# Patient Record
Sex: FEMALE | Race: WHITE | Employment: UNEMPLOYED | URBAN - METROPOLITAN AREA
[De-identification: names, ages, dates, MRNs, and addresses within clinical notes are randomized per-mention and may not be internally consistent; named-entity substitution may affect disease eponyms.]

---

## 2017-01-16 ENCOUNTER — OFFICE VISIT (OUTPATIENT)
Dept: FAMILY MEDICINE | Facility: OTHER | Age: 32
End: 2017-01-16
Payer: COMMERCIAL

## 2017-01-16 VITALS
HEIGHT: 65 IN | OXYGEN SATURATION: 96 % | WEIGHT: 266.4 LBS | HEART RATE: 93 BPM | DIASTOLIC BLOOD PRESSURE: 84 MMHG | TEMPERATURE: 97.8 F | BODY MASS INDEX: 44.39 KG/M2 | SYSTOLIC BLOOD PRESSURE: 132 MMHG | RESPIRATION RATE: 16 BRPM

## 2017-01-16 DIAGNOSIS — Z76.89 ENCOUNTER TO ESTABLISH CARE: ICD-10-CM

## 2017-01-16 DIAGNOSIS — R63.8 UNABLE TO LOSE WEIGHT: ICD-10-CM

## 2017-01-16 DIAGNOSIS — M25.579 PAIN IN JOINT INVOLVING ANKLE AND FOOT, UNSPECIFIED LATERALITY: Primary | ICD-10-CM

## 2017-01-16 DIAGNOSIS — R06.83 SNORING: ICD-10-CM

## 2017-01-16 DIAGNOSIS — Z30.40 ENCOUNTER FOR SURVEILLANCE OF CONTRACEPTIVES: ICD-10-CM

## 2017-01-16 DIAGNOSIS — G89.29 CHRONIC BILATERAL LOW BACK PAIN WITHOUT SCIATICA: ICD-10-CM

## 2017-01-16 DIAGNOSIS — M54.50 CHRONIC BILATERAL LOW BACK PAIN WITHOUT SCIATICA: ICD-10-CM

## 2017-01-16 DIAGNOSIS — F90.0 ADHD (ATTENTION DEFICIT HYPERACTIVITY DISORDER), INATTENTIVE TYPE: ICD-10-CM

## 2017-01-16 LAB
CRP SERPL-MCNC: 7.3 MG/L (ref 0–8)
ERYTHROCYTE [SEDIMENTATION RATE] IN BLOOD BY WESTERGREN METHOD: 15 MM/H (ref 0–20)
HBA1C MFR BLD: 5.5 % (ref 4.3–6)
TSH SERPL DL<=0.005 MIU/L-ACNC: 2.28 MU/L (ref 0.4–4)

## 2017-01-16 PROCEDURE — 83036 HEMOGLOBIN GLYCOSYLATED A1C: CPT | Performed by: STUDENT IN AN ORGANIZED HEALTH CARE EDUCATION/TRAINING PROGRAM

## 2017-01-16 PROCEDURE — 36415 COLL VENOUS BLD VENIPUNCTURE: CPT | Performed by: STUDENT IN AN ORGANIZED HEALTH CARE EDUCATION/TRAINING PROGRAM

## 2017-01-16 PROCEDURE — 85652 RBC SED RATE AUTOMATED: CPT | Performed by: STUDENT IN AN ORGANIZED HEALTH CARE EDUCATION/TRAINING PROGRAM

## 2017-01-16 PROCEDURE — 86140 C-REACTIVE PROTEIN: CPT | Performed by: STUDENT IN AN ORGANIZED HEALTH CARE EDUCATION/TRAINING PROGRAM

## 2017-01-16 PROCEDURE — 86431 RHEUMATOID FACTOR QUANT: CPT | Performed by: STUDENT IN AN ORGANIZED HEALTH CARE EDUCATION/TRAINING PROGRAM

## 2017-01-16 PROCEDURE — 84443 ASSAY THYROID STIM HORMONE: CPT | Performed by: STUDENT IN AN ORGANIZED HEALTH CARE EDUCATION/TRAINING PROGRAM

## 2017-01-16 PROCEDURE — 99214 OFFICE O/P EST MOD 30 MIN: CPT | Performed by: STUDENT IN AN ORGANIZED HEALTH CARE EDUCATION/TRAINING PROGRAM

## 2017-01-16 RX ORDER — ETONOGESTREL AND ETHINYL ESTRADIOL VAGINAL RING .015; .12 MG/D; MG/D
RING VAGINAL
Qty: 1 EACH | Refills: 1 | Status: SHIPPED | OUTPATIENT
Start: 2017-01-16 | End: 2017-05-08

## 2017-01-16 RX ORDER — CYCLOBENZAPRINE HCL 10 MG
10 TABLET ORAL DAILY PRN
Qty: 30 TABLET | Refills: 0 | Status: SHIPPED | OUTPATIENT
Start: 2017-01-16 | End: 2017-11-29

## 2017-01-16 ASSESSMENT — PATIENT HEALTH QUESTIONNAIRE - PHQ9
10. IF YOU CHECKED OFF ANY PROBLEMS, HOW DIFFICULT HAVE THESE PROBLEMS MADE IT FOR YOU TO DO YOUR WORK, TAKE CARE OF THINGS AT HOME, OR GET ALONG WITH OTHER PEOPLE: SOMEWHAT DIFFICULT
SUM OF ALL RESPONSES TO PHQ QUESTIONS 1-9: 16

## 2017-01-16 ASSESSMENT — PAIN SCALES - GENERAL: PAINLEVEL: NO PAIN (0)

## 2017-01-16 NOTE — PROGRESS NOTES
SUBJECTIVE:                                                    Geneva Cooley is a 31 year old female who presents to clinic today for the following health issues:      Answers for HPI/ROS submitted by the patient on 1/16/2017   If you checked off any problems, how difficult have these problems made it for you to do your work, take care of things at home, or get along with other people?: Somewhat difficult  PHQ9 TOTAL SCORE: 16      Pt is here for contraception request. Pt was on NuvaRing and would like to be back on it. Pt states when she last had the nuvaring and removed it, it was painful. Pt stated she had cramps and was unable to sit; drives school bus.  She thinks she left the NuvaRing in too long when she had it in the past.  She plans to keep better track of when to take it out in hopes that this will prevent cramping     Pt would also like refill on flexeril.  She takes this on occasion for bilateral low back pain and ankle pain     Was going to chiropractor - now can only get 1 x per year with change in insurance, was going 2-3 times per week    5K - 3 years ago - stopped running because of shin splints, now feels to heavy to start exercising and with back pain makes it difficult  Family history of breast cancer - 58 y.o. mother    Weight loss - has been trying to lose weight but has not been able to  Typical daily intake:   6 am - Bagel with PB  Done with bus routes 8:30 -   Leave for [employer] - 8:50 - sometimes will eat bar,   Home - [address] - Lunch - 2 bowls of cereal, eggs,   Nap - until 130 pm, snack  Work - occasionally vending machine  Dinner - one pan dinners, meatloaf, chicken, lasagna, snacking while cooking  Cereal at 10 pm  Pop - Mt. Dew or Root beer - 4-5 cans/day   Has drank diet pop in the past and may be willing to do this again    Going to school for medical coding - 6 credits at a time    Problem list and histories reviewed & adjusted, as indicated.  Additional history: as  "documented    Labs reviewed in EPIC  Problem list, Medication list, Allergies, and Medical/Social/Surgical histories reviewed in [facility] and updated as appropriate.    ROS:  Constitutional, HEENT, cardiovascular, pulmonary, gi and gu systems are negative, except as otherwise noted.    OBJECTIVE:                                                    /84 mmHg  Pulse 93  Temp(Src) 97.8  F (36.6  C) (Temporal)  Resp 16  Ht 5' 5.2\" (1.656 m)  Wt 266 lb 6.4 oz (120.838 kg)  BMI 44.06 kg/m2  SpO2 96%  LMP 12/26/2016 (Approximate)  Body mass index is 44.06 kg/(m^2).  GENERAL: healthy, alert and no distress  EYES: Eyes grossly normal to inspection, PERRL and conjunctivae and sclerae normal  HENT: ear canals and TM's normal, nose and mouth without ulcers or lesions  NECK: no adenopathy, no asymmetry, masses, or scars and thyroid normal to palpation  RESP: lungs clear to auscultation - no rales, rhonchi or wheezes  CV: regular rate and rhythm, normal S1 S2, no S3 or S4, no murmur, click or rub  ABDOMEN: soft, nontender, no hepatosplenomegaly, no masses and bowel sounds normal  MS: mild edema of right ankle, no gross musculoskeletal defects noted  SKIN: no suspicious lesions or rashes  NEURO: Normal strength and tone, mentation intact and speech normal  BACK: bilateral tenderness of lumbar back, negative straight leg raise bilaterally  PSYCH: mentation appears normal, affect normal/bright     Diagnostic Test Results:  Results for orders placed or performed in visit on 01/16/17   Rheumatoid factor   Result Value Ref Range    Rheumatoid Factor <20 <20 IU/mL   TSH with free T4 reflex   Result Value Ref Range    TSH 2.28 0.40 - 4.00 mU/L   ESR: Erythrocyte sedimentation rate   Result Value Ref Range    Sed Rate 15 0 - 20 mm/h   CRP, inflammation   Result Value Ref Range    CRP Inflammation 7.3 0.0 - 8.0 mg/L   Hemoglobin A1c   Result Value Ref Range    Hemoglobin A1C 5.5 4.3 - 6.0 %        ASSESSMENT/PLAN:                   "                                    Geneva was seen today for contraception.    Diagnoses and all orders for this visit:    Pain in joint involving ankle and foot, unspecified laterality  -     cyclobenzaprine (FLEXERIL) 10 MG tablet; Take 1 tablet (10 mg) by mouth daily as needed for muscle spasms  -     Rheumatoid factor  -     ESR: Erythrocyte sedimentation rate  -     CRP, inflammation    Encounter for surveillance of contraceptives  -     etonogestrel-ethinyl estradiol (NUVARING) 0.12-0.015 MG/24HR vaginal ring; Place 1 ring every 21 days then remove for 1 week. Needs appointment for further refills    Snoring  -     SLEEP EVALUATION & MANAGEMENT REFERRAL - ADULT; Future    Unable to lose weight  -     TSH with free T4 reflex  -     Hemoglobin A1c    ADHD (attention deficit hyperactivity disorder), inattentive type  -     NEUROPSYCHOLOGY REFERRAL    Chronic bilateral low back pain without sciatica      Birth control - Patient would like to restart NuvaRing. No history of clotting disorders, breast cancer, liver disease, DVT.      Obesity, snoring - patient has been unable to lose weight.  We reviewed her typical daily intake.  I recommended cutting out regular soda and replacing with diet soda or water.  I also recommended eating a small meal or snack ever 3 hours as she eats breakfast at 6 am and returns home from bus route around 10:15 am and is starving, at this point she reports she overeats and feels terrible.  This often happens in the evening as well, she will eat while making supper and then eat supper.  She will start with these small steps.  Will check TSH and A1C.  She does report snoring and I have recommended sleep study to r/o sleep apnea as this can contribute to obesity.      Ankle and foot pain, bilateral low back pain - patient has family history of rheumatoid arthritis.  She is concerned about ongoing ankle and foot pain with swelling.  RF, CRP and ESR negative.  Pain may improve with weight  loss.  Flexeril does help relax muscles.  She only takes this at night occasionally, 30 tabs lasts approximately 3 months.      ADHD - has history of ADHD.  Has noticed she has to read the pages of her books for school over and over again as she is unable to focus.  Has seen this pattern in other tasks of daily living, procrastinating, feeling unorganized, difficulty focusing.  Has not been on medication for this in the past.  I have recommended neuropsych testing to confirm diagnosis and then we can discuss treating with medication.      See Patient Instructions  Patient Instructions   Nuva Ring and Flexeril prescriptions sent to pharmacy.    Sleep study - call to set this up if your insurance will cover.    Neuropsychology referral - call to set this up if insurance will cover.    Cut back on regular pop - switch to water or diet pop for most of what you drink on a daily basis.    Eat a small meal every 3 hours while awake, try for fruits, vegetables, whole grains and lean protein.      Try to not eat anything after 7 pm.  If you do eat something try again for fruits, vegetables, whole grains and lean protein.    Call me if you have questions.    Mary Coughlin, CNP  [phone]            Jody Coughlin, APRN [facility]

## 2017-01-16 NOTE — PATIENT INSTRUCTIONS
Nuva Ring and Flexeril prescriptions sent to pharmacy.    Sleep study - call to set this up if your insurance will cover.    Neuropsychology referral - call to set this up if insurance will cover.    Cut back on regular pop - switch to water or diet pop for most of what you drink on a daily basis.    Eat a small meal every 3 hours while awake, try for fruits, vegetables, whole grains and lean protein.      Try to not eat anything after 7 pm.  If you do eat something try again for fruits, vegetables, whole grains and lean protein.    Call me if you have questions.    Mary Coughlin, CNP  [phone]

## 2017-01-16 NOTE — MR AVS SNAPSHOT
After Visit Summary   1/16/2017    Geneva Cooley    MRN: [id number]           Patient Information     Date Of Birth          1985        Visit Information        Provider Department      1/16/2017 11:10 AM Jody Coughlin APRN CNP [facility]        Today's Diagnoses     Pain in joint involving ankle and foot, unspecified laterality    -  1     Encounter for surveillance of contraceptives         Snoring         Unable to lose weight         ADHD (attention deficit hyperactivity disorder), inattentive type           Care Instructions    Nuva Ring and Flexeril prescriptions sent to pharmacy.    Sleep study - call to set this up if your insurance will cover.    Neuropsychology referral - call to set this up if insurance will cover.    Cut back on regular pop - switch to water or diet pop for most of what you drink on a daily basis.    Eat a small meal every 3 hours while awake, try for fruits, vegetables, whole grains and lean protein.      Try to not eat anything after 7 pm.  If you do eat something try again for fruits, vegetables, whole grains and lean protein.    Call me if you have questions.    Mary Coughlin CNP  [phone]            Follow-ups after your visit        Additional Services     NEUROPSYCHOLOGY REFERRAL       Your provider has referred you to:    [facility]: [facility] [phone]   [email or web address]  [facility] [phone]   [email or web address]    All scheduling is subject to the client's specific insurance plan & benefits, provider/location availability, and provider clinical specialities.  Please arrive 15 minutes early for your first appointment and bring your completed paperwork.    Please be aware that coverage of these services is subject to the terms and limitations of your health insurance plan.  Call member services at your health plan with any benefit  or coverage questions.    Please bring the following to your appointment:  >>   Any x-rays, CTs or MRIs which have been performed.  Contact the facility where they were done to arrange for  prior to your scheduled appointment.  Any new CT, MRI or other procedures ordered by your specialist must be performed at a [facility] facility or coordinated by your clinic's referral office.    >>   List of current medications   >>   This referral request   >>   Any documents/labs given to you for this referral            SLEEP EVALUATION & MANAGEMENT REFERRAL - ADULT       Please be aware that coverage of these services is subject to the terms and limitations of your health insurance plan.  Call member services at your health plan with any benefit or coverage questions.      Please bring the following to your appointment:    >>   List of current medications   >>   This referral request   >>   Any documents/labs given to you for this referral    [facility]  Ph [phone]  (Age 13 if over 100 lbs and up)                  Future tests that were ordered for you today     Open Future Orders        Priority Expected Expires Ordered    SLEEP EVALUATION & MANAGEMENT REFERRAL - ADULT Routine  1/16/2018 1/16/2017            Who to contact     If you have questions or need follow up information about today's clinic visit or your schedule please contact [facility] directly at [phone].  Normal or non-critical lab and imaging results will be communicated to you by MyChart, letter or phone within 4 business days after the clinic has received the results. If you do not hear from us within 7 days, please contact the clinic through MyChart or phone. If you have a critical or abnormal lab result, we will notify you by phone as soon as possible.  Submit refill requests through [employer] or call your pharmacy and they will forward the refill request to us. Please allow 3 business days for your refill  "to be completed.          Additional Information About Your Visit        [employer] Information     [employer] gives you secure access to your electronic health record. If you see a primary care provider, you can also send messages to your care team and make appointments. If you have questions, please call your primary care clinic.  If you do not have a primary care provider, please call [phone] and they will assist you.        Care EveryWhere ID     This is your Care EveryWhere ID. This could be used by other organizations to access your [facility] medical records  [id number]        Your Vitals Were     Pulse Temperature Respirations    93 97.8  F (36.6  C) (Temporal) 16    Height BMI (Body Mass Index) Pulse Oximetry    5' 5.2\" (1.656 m) 44.06 kg/m2 96%    Last Period          12/26/2016 (Approximate)         Blood Pressure from Last 3 Encounters:   01/16/17 132/84   12/29/15 118/66   04/06/15 106/80    Weight from Last 3 Encounters:   01/16/17 266 lb 6.4 oz (120.838 kg)   12/29/15 235 lb 12 oz (106.935 kg)   04/06/15 218 lb (98.884 kg)              We Performed the Following     CRP, inflammation     ESR: Erythrocyte sedimentation rate     NEUROPSYCHOLOGY REFERRAL     Rheumatoid factor     TSH with free T4 reflex          Today's Medication Changes          These changes are accurate as of: 1/16/17 12:18 PM.  If you have any questions, ask your nurse or doctor.               These medicines have changed or have updated prescriptions.        Dose/Directions    cyclobenzaprine 10 MG tablet   Commonly known as:  FLEXERIL   This may have changed:  when to take this   Used for:  Pain in joint involving ankle and foot, unspecified laterality   Changed by:  Jody Coughlin APRN CNP        Dose:  10 mg   Take 1 tablet (10 mg) by mouth daily as needed for muscle spasms   Quantity:  30 tablet   Refills:  0            Where to get your medicines      These medications were sent to [id number] " [place], [place] - [address] [address]  [address] [facility] [address]     Phone:  [phone]    - cyclobenzaprine 10 MG tablet  - etonogestrel-ethinyl estradiol 0.12-0.015 MG/24HR vaginal ring             Primary Care Provider Office Phone # Fax #    Esthela Velasquez PA-C [phone]       XXX RESIGNED  [facility] [address]        Thank you!     Thank you for choosing [facility]  for your care. Our goal is always to provide you with excellent care. Hearing back from our patients is one way we can continue to improve our services. Please take a few minutes to complete the written survey that you may receive in the mail after your visit with us. Thank you!             Your Updated Medication List - Protect others around you: Learn how to safely use, store and throw away your medicines at www.disposemymeds.org.          This list is accurate as of: 1/16/17 12:18 PM.  Always use your most recent med list.                   Brand Name Dispense Instructions for use    cyclobenzaprine 10 MG tablet    FLEXERIL    30 tablet    Take 1 tablet (10 mg) by mouth daily as needed for muscle spasms       etonogestrel-ethinyl estradiol 0.12-0.015 MG/24HR vaginal ring    NUVARING    1 each    Place 1 ring every 21 days then remove for 1 week. Needs appointment for further refills       MOTRIN IB PO      Take 600 mg by mouth       naproxen 500 MG tablet    NAPROSYN    60 tablet    Take 1 tablet (500 mg) by mouth 2 times daily (with meals) As needed

## 2017-01-16 NOTE — NURSING NOTE
"Chief Complaint   Patient presents with     Contraception       Initial /84 mmHg  Pulse 93  Temp(Src) 97.8  F (36.6  C) (Temporal)  Resp 16  Ht 5' 5.2\" (1.656 m)  Wt 266 lb 6.4 oz (120.838 kg)  BMI 44.06 kg/m2  SpO2 96%  LMP 12/26/2016 (Approximate) Estimated body mass index is 44.06 kg/(m^2) as calculated from the following:    Height as of this encounter: 5' 5.2\" (1.656 m).    Weight as of this encounter: 266 lb 6.4 oz (120.838 kg).  BP completed using cuff size: large.  Berna Munoz CMA      "

## 2017-01-17 LAB — RHEUMATOID FACT SER NEPH-ACNC: <20 IU/ML (ref 0–20)

## 2017-01-17 ASSESSMENT — PATIENT HEALTH QUESTIONNAIRE - PHQ9: SUM OF ALL RESPONSES TO PHQ QUESTIONS 1-9: 16

## 2017-01-20 ENCOUNTER — TELEPHONE (OUTPATIENT)
Dept: FAMILY MEDICINE | Facility: OTHER | Age: 32
End: 2017-01-20

## 2017-01-20 NOTE — TELEPHONE ENCOUNTER
Reason for call:  Results  Reason for Call:  Request for results:    Name of test or procedure: labs    Date of test of procedure: 1-16-17     Location of the test or procedure: [place]    OK to leave the result message on voice mail or with a family member? YES    Phone number Patient can be reached at:  Home number on file [phone] (home) ok to leave detailed message    Additional comments: call with results.    Call taken on 1/20/2017 at 9:14 AM by Geent Faye

## 2017-01-20 NOTE — TELEPHONE ENCOUNTER
Called and discussed results of labs normal.  Patient interested in gastric bypass.  Will look into criteria for this and call patient back.  Mary Coughlin, CNP

## 2017-01-29 PROBLEM — G89.29 CHRONIC BILATERAL LOW BACK PAIN WITHOUT SCIATICA: Status: ACTIVE | Noted: 2017-01-29

## 2017-01-29 PROBLEM — S86.899A SHIN SPLINTS: Status: ACTIVE | Noted: 2017-01-29

## 2017-01-29 PROBLEM — Z80.3 FAMILY HISTORY OF BREAST CANCER IN MOTHER: Status: ACTIVE | Noted: 2017-01-29

## 2017-01-29 PROBLEM — Z82.61 FAMILY HISTORY OF RHEUMATOID ARTHRITIS: Status: ACTIVE | Noted: 2017-01-29

## 2017-01-29 PROBLEM — M54.50 CHRONIC BILATERAL LOW BACK PAIN WITHOUT SCIATICA: Status: ACTIVE | Noted: 2017-01-29

## 2017-01-31 ENCOUNTER — OFFICE VISIT (OUTPATIENT)
Dept: SLEEP MEDICINE | Facility: CLINIC | Age: 32
End: 2017-01-31
Payer: COMMERCIAL

## 2017-01-31 VITALS
BODY MASS INDEX: 44.98 KG/M2 | HEART RATE: 96 BPM | SYSTOLIC BLOOD PRESSURE: 137 MMHG | OXYGEN SATURATION: 96 % | HEIGHT: 65 IN | WEIGHT: 270 LBS | DIASTOLIC BLOOD PRESSURE: 77 MMHG

## 2017-01-31 DIAGNOSIS — R06.83 SNORING: ICD-10-CM

## 2017-01-31 DIAGNOSIS — G47.33 OSA (OBSTRUCTIVE SLEEP APNEA): Primary | ICD-10-CM

## 2017-01-31 PROCEDURE — 99203 OFFICE O/P NEW LOW 30 MIN: CPT | Performed by: PHYSICIAN ASSISTANT

## 2017-01-31 RX ORDER — ZOLPIDEM TARTRATE 5 MG/1
TABLET ORAL
Qty: 1 TABLET | Refills: 0 | Status: SHIPPED | OUTPATIENT
Start: 2017-01-31 | End: 2017-08-28

## 2017-01-31 NOTE — PATIENT INSTRUCTIONS
Your BMI is There is no weight on file to calculate BMI.  Weight management is a personal decision.  If you are interested in exploring weight loss strategies, the following discussion covers the approaches that may be successful. Body mass index (BMI) is one way to tell whether you are at a healthy weight, overweight, or obese. It measures your weight in relation to your height.  A BMI of 18.5 to 24.9 is in the healthy range. A person with a BMI of 25 to 29.9 is considered overweight, and someone with a BMI of 30 or greater is considered obese. More than two-thirds of American adults are considered overweight or obese.  Being overweight or obese increases the risk for further weight gain. Excess weight may lead to heart disease and diabetes.  Creating and following plans for healthy eating and physical activity may help you improve your health.  Weight control is part of healthy lifestyle and includes exercise, emotional health, and healthy eating habits. Careful eating habits lifelong are the mainstay of weight control. Though there are significant health benefits from weight loss, long-term weight loss with diet alone may be very difficult to achieve- studies show long-term success with dietary management in less than 10% of people. Attaining a healthy weight may be especially difficult to achieve in those with severe obesity. In some cases, medications, devices and surgical management might be considered.  What can you do?  If you are overweight or obese and are interested in methods for weight loss, you should discuss this with your provider.     Consider reducing daily calorie intake by 500 calories.     Keep a food journal.     Avoiding skipping meals, consider cutting portions instead.    Diet combined with exercise helps maintain muscle while optimizing fat loss. Strength training is particularly important for building and maintaining muscle mass. Exercise helps reduce stress, increase energy, and  "improves fitness. Increasing exercise without diet control, however, may not burn enough calories to loose weight.       Start walking three days a week 10-20 minutes at a time    Work towards walking thirty minutes five days a week     Eventually, increase the speed of your walking for 1-2 minutes at time    In addition, we recommend that you review healthy lifestyles and methods for weight loss available through the National Institutes of Health patient information sites:  http://win.niddk.nih.gov/publications/index.htm    And look into health and wellness programs that may be available through your health insurance provider, employer, local community center, or aleksandr club.    Weight management plan: Patient was referred to their PCP to discuss a diet and exercise plan.    MY TREATMENT INFORMATION FOR SLEEP APNEA-  [place] Cooley    DOCTOR : Nacho Johnson  SLEEP CENTER :      MY CONTACT NUMBER:       If I haven't had a sleep study yet, what can I expect?  A personal story from [facility]  https://www.[employer].com/watch?v=AxPLmlRpnCs    Am I having a home sleep study?  Here is a video in case you get home and want to make sure you have done it correctly  https://www.[employer].com/watch?v=FQX7R3pKmz0&feature=youtu.be    Frequently asked questions:  1. What is Obstructive Sleep Apnea (RODRIGO)? RODRIGO is the most common type of sleep apnea. Apnea literally means, \"without breath.\" It is characterized by repetitive pauses in breathing, despite continued effort to breathe, and is usually associated with a reduction in blood oxygen saturation. Apneas can last 10 to over 60 seconds. It is caused by narrowing or collapse of the upper airway as muscles relax during sleep. Severity of sleep apnea is determined by frequency of breathing events and their effect on your sleep and oxygen levels determined during sleep testing.   2. What are the consequences of RODRIGO? Symptoms include: daytime sleepiness- possibly increasing the risk of falling " asleep while driving, unrefreshing/restless sleep, snoring, insomnia, waking frequently to urinate, waking with heartburn or reflux, reduced concentration and memory, and morning headaches. Other health consequences may include development of high blood pressure and other cardiovascular disease in persons who are susceptible. Untreated RODRIGO  can contribute to heart disease, stroke and diabetes.   3. What are the treatment options? In most situations, sleep apnea is a lifelong disease that must be managed with daily therapy. Medications are not effective for sleep apnea and surgery is generally not performed until other therapies have been tried. Therapy is usually tailored to the individual patient based on many factors including your wishes as well as severity of sleep apnea and severity of obesity. Continuous Positive Airway (CPAP) is the most reliable treatment. An oral device to hold your jaw forward is usually the next most reliable option. Other options include postioning devices (to keep you off your back), weight loss, and surgery including a tongue pacing device. There is more detail about some of these options below.            1. CPAP-  WHAT DOES IT DO AND HOW CAN I LEARN TO WEAR IT?                               BEFORE I START, CAN I WATCH A MOVIE TO GET A PLAN ON HOW TO USE CPAP?  https://www.[employer].com/watch?f=m2F00tk824S      Continuous positive airway pressure, or CPAP, is the most effective treatment for obstructive sleep apnea. It works by blowing room air, through a mask, to hold your throat open. A decision to use CPAP is a major step forward in the pursuit of a healthier life. The successful use of CPAP will help you breathe easier, sleep better and live healthier. You can choose CPAP equipment from any durable medical equipment provider that meets your needs.  Using CPAP can be a positive experience if you keep these dailey points in mind:  1. Commitment  CPAP is not a quick fix for your problem.  "It involves a long-term commitment to improve your sleep and your health.    2. Communication  Stay in close communication with both your sleep doctor and your CPAP supplier. Ask lots of questions and seek help when you need it.    3. Consistency  Use CPAP all night, every night and for every nap. You will receive the maximum health benefits from CPAP when you use it every time that you sleep. This will also make it easier for your body to adjust to the treatment.    4. Correction  The first machine and mask that you try may not be the best ones for you. Work with your sleep doctor and your CPAP supplier to make corrections to your equipment selection. Ask about trying a different type of machine or mask if you have ongoing problems. Make sure that your mask is a good fit and learn to use your equipment properly.    5. Challenge  Tell a family member or close friend to ask you each morning if you used your CPAP the previous night. Have someone to challenge you to give it your best effort.    6. Connection   Your adjustment to CPAP will be easier if you are able to connect with others who use the same treatment. Ask your sleep doctor if there is a support group in your area for people who have sleep apnea, or look for one on the Internet.  7. Comfort   Increase your level of comfort by using a saline spray, decongestant or heated humidifier if CPAP irritates your nose, mouth or throat. Use your unit's \"ramp\" setting to slowly get used to the air pressure level. There may be soft pads you can buy that will fit over your mask straps. Look on www.CPAP.com for accessories that can help make CPAP use more comfortable.  8. Cleaning   Clean your mask, tubing and headgear on a regular basis. Put this time in your schedule so that you don't forget to do it. Check and replace the filters for your CPAP unit and humidifier.    9. Completion   Although you are never finished with CPAP therapy, you should reward yourself by " celebrating the completion of your first month of treatment. Expect this first month to be your hardest period of adjustment. It will involve some trial and error as you find the machine, mask and pressure settings that are right for you.    10. Continuation  After your first month of treatment, continue to make a daily commitment to use your CPAP all night, every night and for every nap.    CPAP-Tips to starting with success:  Begin using your CPAP for short periods of time during the day while you watch TV or read.    Use CPAP every night and for every nap. Using it less often reduces the health benefits and makes it harder for your body to get used to it.    Make small adjustments to your mask, tubing, straps and headgear until you get the right fit. Tightening the mask may actually worsen the leak.  If it leaves significant marks on your face or irritates the bridge of your nose, it may not be the best mask for you.  Speak with the person who supplied the mask and consider trying other masks. Insurances will allow you to try different masks during the first month of starting CPAP.  Insurance also covers a new mask, hose and filter about every 6 months.    Use a saline nasal spray to ease mild nasal congestion. Neti-Pot or saline nasal rinses may also help. Nasal gel sprays can help reduce nasal dryness.  Biotene mouthwash can be helpful to protect your teeth if you experience frequent dry mouth.  Dry mouth may be a sign of air escaping out of your mouth or out of the mask in the case of a full face mask.  Speak with your provider if you expect that is the case.     Take a nasal decongestant to relieve more severe nasal or sinus congestion.  Do not use Afrin (oxymetazoline) nasal spray more than 3 days in a row.  Speak with your sleep doctor if your nasal congestion is chronic.    Use a heated humidifier that fits your CPAP model to enhance your breathing comfort. Adjust the heat setting up if you get a dry nose  or throat, down if you get condensation in the hose or mask.  Position the CPAP lower than you so that any condensation in the hose drains back into the machine rather than towards the mask.    Try a system that uses nasal pillows if traditional masks give you problems.    Clean your mask, tubing and headgear once a week. Make sure the equipment dries fully.    Regularly check and replace the filters for your CPAP unit and humidifier.    Work closely with your sleep provider and your CPAP supplier to make sure that you have the machine, mask and air pressure setting that works best for you. It is better to stop using it and call your provider to solve problems than to lay awake all night frustrated with the device.    BESIDES CPAP, WHAT OTHER THERAPIES ARE THERE?      Positioning Device  Positioning devices are generally used when sleep apnea is mild and only occurs on your back.This example shows a pillow that straps around the waist. It may be appropriate for those whose sleep study shows milder sleep apnea that occurs primarily when lying flat on one's back. Preliminary studies have shown benefit but effectiveness at home may need to be verified by a home sleep test. These devices are generally not covered by medical insurance.                      Oral Appliance  What is oral appliance therapy?  An oral appliance is a small acrylic device that fits over the upper and lower teeth or tongue (similar to an orthodontic retainer or a mouth guard). This device slightly advances the lower jaw or tongue, which moves the base of the tongue forward, opens the airway, improves breathing and can effectively treat snoring and obstructive sleep apnea sleep apnea. The appliance is fabricated and customized by a qualified dentist with experience in treating snoring and sleep apnea. Oral appliances are usually well tolerated and have relatively high compliance by patients1, 2, 3.  When is an oral appliance indicated?  Oral  appliance therapy is recommended as a first-line treatment for patients with primary snoring, mild sleep apnea, and for patients with moderate sleep apnea who prefer appliance therapy to use of CPAP4, 5. Severity of sleep apnea is determined by sleep testing and is based on the number of respiratory events per hour of sleep.   How successful is oral appliance therapy?  The success rate of oral appliance therapy in patients with mild sleep apnea is 75-80% while in patients with moderate sleep apnea it is 50-70%. The chance of success in patients with severe sleep apnea is 40-50%. The research also shows that oral appliances have a beneficial effect on the cardiovascular health of RODRIGO patients at the same magnitude as CPAP therapy7.  Oral appliances should be a second-line treatment in cases of severe sleep apnea, but if not completely successful then a combination therapy utilizing CPAP plus oral appliance therapy may be effective. Oral appliances tend to be effective in a broad range of patients although studies show that the patients who have the highest success are females, younger patients, those with milder disease, and less severe obesity. 3, 6.   The chances of success are lower in patients who have more severe RODRIGO, are older, and those who are morbidly obese.     Example of an oral appliance   Finding a dentist that practices dental sleep medicine  Specific training is available through the American Academy of Dental Sleep Medicine for dentists interested in working in the field of sleep. To find a dentist who is educated in the field of sleep and the use of oral appliances, near you, visit the Web site of the American Academy of Dental Sleep Medicine; also see   http://www.accpstorage.org/newOrganization/patients/oralAppliances.pdf  To search for a dentist certified in these practices:  Http://aadsm.org/FindADentist.aspx?1  1. Pretty et al. Objectively measured vs self-reported compliance during oral  appliance therapy for sleep-disordered breathing. Chest 2013; 144(5): 6083-8733.  2. Daly, et al. Objective measurement of compliance during oral appliance therapy for sleep-disordered breathing. Thorax 2013; 68(1): 91-96.  3. Kell et al. Mandibular advancement devices in 620 men and women with RODRIGO and snoring: tolerability and predictors of treatment success. Chest 2004; 125: 0149-2490.  4. Ronit et al. Oral appliances for snoring and RODRIGO: a review. Sleep 2006; 29: 244-262.  5. Alysia et al. Oral appliance treatment for RODRIGO: an update. J Clin Sleep Med 2014; 10(2): 215-227.  6. Moira et al. Predictors of OSAH treatment outcome. J Dent Res 2007; 86: 6187-4385.      Weight Loss:    Weight management is a personal decision.  If you are interested in exploring weight loss strategies, the following discussion covers the impact on weight loss on sleep apnea and the approaches that may be successful.    Weight loss decreases severity of sleep apnea in most people with obesity. For those with mild obesity who have developed snoring with weight gain, even 15-30 pound weight loss can improve and occasionally eliminate sleep apnea.  Structured and life-long dietary and health habits are necessary to lose weight and keep healthier weight levels.     Though there may be significant health benefits from weight loss, long-term weight loss is very difficult to achieve- studies show success with dietary management in less than 10% of people. In addition, substantial weight loss may require years of dietary control and may be difficult if patients have severe obesity. In these cases, surgical management may be considered.  Finally, older individuals who have tolerated obesity without health complications may be less likely to benefit from weight loss strategies.    Your BMI is Body mass index is 44.93 kg/(m^2).  Body mass index (BMI) is one way to tell whether you are at a healthy weight, overweight, or  obese. It measures your weight in relation to your height.  A BMI of 18.5 to 24.9 is in the healthy range. A person with a BMI of 25 to 29.9 is considered overweight, and someone with a BMI of 30 or greater is considered obese. More than two-thirds of American adults are considered overweight or obese.  Being overweight or obese increases the risk for further weight gain. Excess weight may lead to heart disease and diabetes.  Creating and following plans for healthy eating and physical activity may help you improve your health.  Weight control is part of healthy lifestyle and includes exercise, emotional health, and healthy eating habits. Careful eating habits lifelong are the mainstay of weight control. Though there are significant health benefits from weight loss, long-term weight loss with diet alone may be very difficult to achieve- studies show long-term success with dietary management in less than 10% of people. Attaining a healthy weight may be especially difficult to achieve in those with severe obesity. In some cases, medications, devices and surgical management might be considered.  What can you do?  If you are overweight or obese and are interested in methods for weight loss, you should discuss this with your provider.     Consider reducing daily calorie intake by 500 calories.     Keep a food journal.     Avoiding skipping meals, consider cutting portions instead.    Diet combined with exercise helps maintain muscle while optimizing fat loss. Strength training is particularly important for building and maintaining muscle mass. Exercise helps reduce stress, increase energy, and improves fitness. Increasing exercise without diet control, however, may not burn enough calories to loose weight.       Start walking three days a week 10-20 minutes at a time    Work towards walking thirty minutes five days a week     Eventually, increase the speed of your walking for 1-2 minutes at time    In addition, we  recommend that you review healthy lifestyles and methods for weight loss available through the National Institutes of Health patient information sites:  http://win.niddk.nih.gov/publications/index.htm    And look into health and wellness programs that may be available through your health insurance provider, employer, local community center, or aleks[employer]r club.    Weight management plan: Patient was referred to their PCP to discuss a diet and exercise plan.    Surgery:    Upper Airway Surgery for RODRIGO  Surgery for RODRIGO is a second-line treatment option in the management of sleep apnea.  Surgery should be considered for patients who are having a difficult time tolerating CPAP.    Surgery for RODRIGO is directed at areas that are responsible for narrowing or complete obstruction of the airway during sleep.  There are a wide range of procedures available to enlarge and/or stabilize the airway to prevent blockage of breathing in the three major areas where it can occur: the palate, tongue, and nasal regions.  Successful surgical treatment depends on the accurate identification of the factors responsible for obstructive sleep apnea in each person.  A personalized approach is required because there is no single treatment that works well for everyone.  Because of anatomic variation, consultation with an examination by a sleep surgeon is a critical first step in determining what surgical options are best for each patient.  In some cases, examination during sedation may be recommended in order to guide the selection of procedures.  Patients will be counseled about risks and benefits as well as the typical recovery course after surgery. Surgery is typically not a cure for a person s RODRIGO.  However, surgery will often significantly improve one s RODRIGO severity (termed  success rate ).  Even in the absence of a cure, surgery will decrease the cardiovascular risk associated with OSA7; improve overall quality of life8 (sleepiness,  functionality, sleep quality, etc).          Palate Procedures:  Patients with RODRIGO often have narrowing of their airway in the region of their tonsils and uvula.  The goals of palate procedures are to widen the airway in this region as well as to help the tissues resist collapse.  Modern palate procedure techniques focus on tissue conservation and soft tissue rearrangement, rather than tissue removal.  Often the uvula is preserved in this procedure. Residual sleep apnea is common in patient after pharyngoplasty with an average reduction in sleep apnea events of 33%2.      Tongue Procedures:  While patients are awake, the muscles that surround the throat are active and keep this region open for breathing. These muscles relax during sleep, allowing the tongue and other structures to collapse and block breathing.  There are several different tongue procedures available.  Selection of a tongue base procedure depends on characteristics seen on physical exam.  Generally, procedures are aimed at removing bulky tissues in this area or preventing the back of the tongue from falling back during sleep.  Success rates for tongue surgery range from 50-62%3.    Hypoglossal Nerve Stimulation:  Hypoglossal nerve stimulation has recently received approval from the [place] Food and Drug Administration for the treatment of obstructive sleep apnea.  This is based on research showing that the system was safe and effective in treating sleep apnea6.  Results showed that the median AHI score decreased 68%, from 29.3 to 9.0. This therapy uses an implant system that senses breathing patterns and delivers mild stimulation to airway muscles, which keeps the airway open during sleep.  The system consists of three fully implanted components: a small generator (similar in size to a pacemaker), a breathing sensor, and a stimulation lead.  Using a small handheld remote, a patient turns the therapy on before bed and off upon awakening.     Candidates for this device must be greater than 22 years of age, have moderate to severe RODRIGO (AHI between 20-65), BMI less than 32, have tried CPAP/oral appliance without success, and have appropriate upper airway anatomy (determined by a sleep endoscopy performed by Dr. Thomson).    Hypoglossal Nerve Stimulation Pathway:    The sleep surgeon s office will work with the patient through the insurance prior-authorization process (including communications and appeals).    Nasal Procedures:  Nasal obstruction can interfere with nasal breathing during the day and night.  Studies have shown that relief of nasal obstruction can improve the ability of some patients to tolerate positive airway pressure therapy for obstructive sleep apnea1.  Treatment options include medications such as nasal saline, topical corticosteroid and antihistamine sprays, and oral medications such as antihistamines or decongestants. Non-surgical treatments can include external nasal dilators for selected patients. If these are not successful by themselves, surgery can improve the nasal airway either alone or in combination with these other options.      Combination Procedures:  Combination of surgical procedures and other treatments may be recommended, particularly if patients have more than one area of narrowing or persistent positional disease.  The success rate of combination surgery ranges from 66-80%2,3.      1. Amish YOO. The Role of the Nose in Snoring and Obstructive Sleep Apnoea: An Update.  Eur Arch Otorhinolaryngol. 2011; 268: 1365-73.  2.  Jesús SM; Johnie JA; Adonis JR; Pallanch JF; Marlee MB; Marlen SG; Rosy PEREZD. Surgical modifications of the upper airway for obstructive sleep apnea in adults: a systematic review and meta-analysis. SLEEP 2010;33(10):9529-3986. Jacobo VELÁZQUEZ. Hypopharyngeal surgery in obstructive sleep apnea: an evidence-based medicine review.  Arch Otolaryngol Head Neck Surg. 2006 Feb;132(2):206-13.  3. Josiah HASTINGS,  Indu Y, Pantera JESSICA. The efficacy of anatomically based multilevel surgery for obstructive sleep apnea. Otolaryngol Head Neck Surg. 2003 Oct;129(4):327-35.  4. Jacobo VELÁZQUEZ, Goldberg A. Hypopharyngeal Surgery in Obstructive Sleep Apnea: An Evidence-Based Medicine Review. Arch Otolaryngol Head Neck Surg. 2006 Feb;132(2):206-13.  5. Richy FELDER et al. Upper-Airway Stimulation for Obstructive Sleep Apnea.  N Engl J Med. 2014 Jan 9;370(2):139-49.  6. Angelic Y et al. Increased Incidence of Cardiovascular Disease in Middle-aged Men with Obstructive Sleep Apnea. Am J Respir Crit Care Med; 2002 166: 159-165  7. Mahesh DUMONT et al. Studying Life Effects and Effectiveness of Palatopharyngoplasty (SLEEP) study: Subjective Outcomes of Isolated Uvulopalatopharyngoplasty. Otolaryngol Head Neck Surg. 2011; 144: 623-631.    Stress, tension, and anxiety can be big factors contributing to insomnia.  If you can t relax your mind and body, then you won t be able to sleep.  You would likely benefit from trying some of the relaxation exercises that we ve assembled below.  These are all internet based links.  If you don t have or use a computer, you may benefit from one of the stress management books listed below that you can get at your public library or at a local bookstore for a relatively low price.      Copy and paste into web browser address window   https://www.youtube.com/watch?v=Ksxc3buTnYq    Progressive Muscle Relaxation (PMR):     http://www.[employer]/progressive-muscle-relaxation-exercise.html     http://studentsupport.[facility]/counseling/resources/self-help/relaxation-and-stress-management/   Deep Breathing Exercises:    http://www.[employer]/breathing-awareness.html     Meditation:     www.[employer]    www.[employer]Syn[employer]idedDeluxeBoxmeditation-site.com You may have to pay for some of these resources.    Guided Imagery:    http://www.[employer]/guided-imagery-scripts.html      http://[employer].com/library/gkbamqedvh-nzksie-czyycxu/    [place] site under construction : http://www.[facility].org/Services/SleepMedicine/Audio/index.htm

## 2017-01-31 NOTE — PROGRESS NOTES
Sleep Consultation:    Date on this visit: 1/31/2017    Geneva Cooley is sent by No ref. provider found for a sleep consultation regarding fatigue, excessive sleep.     Primary Physician: Esthela Velasquez     Geneva Cooley reports frequent snoring for three years.     Geneva goes to sleep at 7:30 PM during the week. She wakes up at 6:30 AM with an alarm. She falls asleep in 60 minutes.  Geneva has occasional difficulty falling asleep.  She wakes up 5-8 times a night for 10 minutes before falling back to sleep.  Geneva wakes up to go to the bathroom.  On weekends, Geneva goes to sleep at 7:30 PM.  She wakes up at 11:00 AM without an alarm. She falls asleep in 60 minutes.  Patient gets an average of 10 hours of sleep per night.     Patient does use electronics in bed.     Geneva does not do shift work.  She works day shifts.      Geneva does snore some nights. Patient does not have a regular bed partner. There is report of snoring.  She does not have witnessed apneas. Patient sleeps on her back and side. She has occasional restless legs,. Geneva denies any bruxism.    She confirms sleep talking as a child.  Geneva has heartburn and situational depression.      Geneva has gained 30-40 pounds in the last year.  Patient describes themself as a morning person.  She would prefer to go to sleep at 10:00 PM and wake up at 5:30 AM.  Patient's [place] Sleepiness score 6/24 inconsistent with excessive  daytime sleepiness.      Geneva naps 5-6 times per week for 120-180 minutes, feels unrefreshed after naps. She takes no inadvertant naps.  She admits dozing while driving. The most recent episode was 1 month(s) ago.  Patient was counseled on the importance of driving while alert, to pull over if drowsy, or nap before getting into the vehicle if sleepy.  She uses 4 sodas/day, occasional coffee and energy drink. Last caffeine intake is usually before 4 pm.    Allergies:    Allergies   Allergen Reactions     Vicodin [Hydrocodone-Acetaminophen]  Hives       Medications:    Current Outpatient Prescriptions   Medication Sig Dispense Refill     cyclobenzaprine (FLEXERIL) 10 MG tablet Take 1 tablet (10 mg) by mouth daily as needed for muscle spasms 30 tablet 0     etonogestrel-ethinyl estradiol (NUVARING) 0.12-0.015 MG/24HR vaginal ring Place 1 ring every 21 days then remove for 1 week. Needs appointment for further refills 1 each 1     MOTRIN IB PO Take 600 mg by mouth       naproxen (NAPROSYN) 500 MG tablet Take 1 tablet (500 mg) by mouth 2 times daily (with meals) As needed 60 tablet 1       Problem List:  Patient Active Problem List    Diagnosis Date Noted     Chronic bilateral low back pain without sciatica 01/29/2017     Priority: Medium     Family history of breast cancer in mother 01/29/2017     Priority: Medium     Shin splints 01/29/2017     Priority: Medium     Family history of rheumatoid arthritis 01/29/2017     Priority: Medium     Anxiety 04/06/2015     Major depressive disorder, single episode, moderate (H) 04/06/2015     CARDIOVASCULAR SCREENING; LDL GOAL LESS THAN 160 12/06/2012     Insomnia 04/17/2012     Knee strain 04/17/2012     Obesity 04/06/2012     Body mass index is 32.48 kg/(m^2).            Past Medical/Surgical History:  Past Medical History   Diagnosis Date     Depression      Shingles outbreak 2012     Past Surgical History   Procedure Laterality Date     No history of surgery         Social History:  Social History     Social History     Marital Status: Single     Spouse Name: N/A     Number of Children: N/A     Years of Education: N/A     Occupational History           school bus diver     Social History Main Topics     Smoking status: Never Smoker      Smokeless tobacco: Never Used     Alcohol Use: 0.0 oz/week     0 Standard drinks or equivalent per week      Comment: rarely     Drug Use: No     Sexual Activity:     Partners: Male     Birth Control/ Protection: Condom, Inserts/Ring     Other Topics Concern     Parent/Sibling W/  Cabg, Mi Or Angioplasty Before 65f 55m? Yes     Dad- Heartattack, Mom- Stroke     Social History Narrative       Family History:  Family History   Problem Relation Age of Onset     Hypertension Mother      DIABETES Mother      Hypertension Father      Arthritis Father      CEREBROVASCULAR DISEASE Mother 46     aneurysm     HEART DISEASE Father      MI in late 40's      Lipids Mother      Lipids Father      Depression Mother      Hypertension Sister      Hypertension Brother      Breast Cancer Mother 60       Review of Systems:  A complete review of systems reviewed by me is negative with the exeption of what has been mentioned in the history of present illness.  CONSTITUTIONAL:  POSITIVE for  weight gain, fever , chills, sweats and drug allergies :vicodin  EYES:  POSITIVE for changes in vision  ENT:  POSITIVE for  sore throat  CARDIAC:  POSITIVE for  swollen feet  NEUROLOGIC:  POSITIVE for  headaches  DERMATOLOGIC: NEGATIVE for rashes, new moles or change in mole(s)  PULMONARY:  POSITIVE for  SOB with activity and productive cough  GASTROINTESTINAL: NEGATIVE for nausea or vomitting, loose or watery stools, fat or grease in stools, constipation, abdominal pain, bowel movements black in color or blood noted.  GENITOURINARY:  POSITIVE for  urinating more frequently than usual and irregular menstrual periods  MUSCULOSKELETAL:  POSITIVE for  muscle pain and bone or joint pain  ENDOCRINE: NEGATIVE for increased thirst or urination, diabetes.  LYMPHATIC: NEGATIVE for swollen lymph nodes, lumps or bumps in the breasts or nipple discharge.    Physical Examination:  Vitals: LMP 12/26/2016 (Approximate)  BMI= Body mass index is 44.93 kg/(m^2).         No flowsheet data found.    GENERAL APPEARANCE: healthy, alert and no distress  HENT: nose and mouth without ulcers or lesions, oropharynx crowded and tonsillar exudate  NECK: no adenopathy, no asymmetry, masses, or scars, thyroid normal to palpation and trachea midline and  normal to palpation  RESP: lungs clear to auscultation - no rales, rhonchi or wheezes  CV: regular rates and rhythm, normal S1 S2, no S3 or S4 and no murmur, click or rub  MS: extremities normal- no gross deformities noted  PSYCH: mentation appears normal and affect normal/bright  Mallampati Class: III.  Tonsillar Stage: 2  visible at pillars.    Impression/Plan:  Excessive fatigue, history of snoring, recent weight gain. TSH is normal. Will set up a split night study with TCM monitoring due to BMI.  Literature provided regarding sleep apnea and insomnia.      She will follow up with me in approximately two weeks after her sleep study has been competed to review the results and discuss plan of care.       Polysomnography reviewed.  Obstructive sleep apnea reviewed.  Complications of untreated sleep apnea were reviewed.  Daria: 3      CC: No ref. provider found

## 2017-01-31 NOTE — NURSING NOTE
"Chief Complaint   Patient presents with     Sleep Problem     fatigued wt gain       Initial /77 mmHg  Pulse 96  Ht 1.651 m (5' 5\")  Wt 122.471 kg (270 lb)  BMI 44.93 kg/m2  SpO2 96%  LMP 12/26/2016 (Approximate) Estimated body mass index is 44.93 kg/(m^2) as calculated from the following:    Height as of this encounter: 1.651 m (5' 5\").    Weight as of this encounter: 122.471 kg (270 lb).  BP completed using cuff size: large    "

## 2017-01-31 NOTE — MR AVS SNAPSHOT
After Visit Summary   1/31/2017    Geneva Cooley    MRN: [id number]           Patient Information     Date Of Birth          1985        Visit Information        Provider Department      1/31/2017 10:00 AM Nacho Simmons PA-C [facility] [place]        Today's Diagnoses     RODRIGO (obstructive sleep apnea)    -  1     Snoring           Care Instructions      Your BMI is There is no weight on file to calculate BMI.  Weight management is a personal decision.  If you are interested in exploring weight loss strategies, the following discussion covers the approaches that may be successful. Body mass index (BMI) is one way to tell whether you are at a healthy weight, overweight, or obese. It measures your weight in relation to your height.  A BMI of 18.5 to 24.9 is in the healthy range. A person with a BMI of 25 to 29.9 is considered overweight, and someone with a BMI of 30 or greater is considered obese. More than two-thirds of American adults are considered overweight or obese.  Being overweight or obese increases the risk for further weight gain. Excess weight may lead to heart disease and diabetes.  Creating and following plans for healthy eating and physical activity may help you improve your health.  Weight control is part of healthy lifestyle and includes exercise, emotional health, and healthy eating habits. Careful eating habits lifelong are the mainstay of weight control. Though there are significant health benefits from weight loss, long-term weight loss with diet alone may be very difficult to achieve- studies show long-term success with dietary management in less than 10% of people. Attaining a healthy weight may be especially difficult to achieve in those with severe obesity. In some cases, medications, devices and surgical management might be considered.  What can you do?  If you are overweight or obese and are interested in methods for weight loss, you should discuss  "this with your provider.     Consider reducing daily calorie intake by 500 calories.     Keep a food journal.     Avoiding skipping meals, consider cutting portions instead.    Diet combined with exercise helps maintain muscle while optimizing fat loss. Strength training is particularly important for building and maintaining muscle mass. Exercise helps reduce stress, increase energy, and improves fitness. Increasing exercise without diet control, however, may not burn enough calories to loose weight.       Start walking three days a week 10-20 minutes at a time    Work towards walking thirty minutes five days a week     Eventually, increase the speed of your walking for 1-2 minutes at time    In addition, we recommend that you review healthy lifestyles and methods for weight loss available through the National Institutes of Health patient information sites:  http://win.niddk.nih.gov/publications/index.htm    And look into health and wellness programs that may be available through your health insurance provider, employer, local community center, or aleksandr club.    Weight management plan: Patient was referred to their PCP to discuss a diet and exercise plan.    MY TREATMENT INFORMATION FOR SLEEP APNEA-  Geneva Cooley    DOCTOR : Nacho Johnson  SLEEP CENTER :      MY CONTACT NUMBER:       If I haven't had a sleep study yet, what can I expect?  A personal story from [employer]  https://www.youDriveube.com/watch?v=AxPLmlRpnCs    Am I having a home sleep study?  Here is a video in case you get home and want to make sure you have done it correctly  https://www.Easy Iceube.com/watch?v=UCA2W7lEyy6&feature=youtu.be    Frequently asked questions:  1. What is Obstructive Sleep Apnea (RODRIGO)? RODRIGO is the most common type of sleep apnea. Apnea literally means, \"without breath.\" It is characterized by repetitive pauses in breathing, despite continued effort to breathe, and is usually associated with a reduction in blood oxygen saturation. Apneas " can last 10 to over 60 seconds. It is caused by narrowing or collapse of the upper airway as muscles relax during sleep. Severity of sleep apnea is determined by frequency of breathing events and their effect on your sleep and oxygen levels determined during sleep testing.   2. What are the consequences of RODRIGO? Symptoms include: daytime sleepiness- possibly increasing the risk of falling asleep while driving, unrefreshing/restless sleep, snoring, insomnia, waking frequently to urinate, waking with heartburn or reflux, reduced concentration and memory, and morning headaches. Other health consequences may include development of high blood pressure and other cardiovascular disease in persons who are susceptible. Untreated RODRIGO  can contribute to heart disease, stroke and diabetes.   3. What are the treatment options? In most situations, sleep apnea is a lifelong disease that must be managed with daily therapy. Medications are not effective for sleep apnea and surgery is generally not performed until other therapies have been tried. Therapy is usually tailored to the individual patient based on many factors including your wishes as well as severity of sleep apnea and severity of obesity. Continuous Positive Airway (CPAP) is the most reliable treatment. An oral device to hold your jaw forward is usually the next most reliable option. Other options include postioning devices (to keep you off your back), weight loss, and surgery including a tongue pacing device. There is more detail about some of these options below.            1. CPAP-  WHAT DOES IT DO AND HOW CAN I LEARN TO WEAR IT?                               BEFORE I START, CAN I WATCH A MOVIE TO GET A PLAN ON HOW TO USE CPAP?  https://www.EvaluAgent.com/watch?l=k6K67gv670H      Continuous positive airway pressure, or CPAP, is the most effective treatment for obstructive sleep apnea. It works by blowing room air, through a mask, to hold your throat open. A decision to  "use CPAP is a major step forward in the pursuit of a healthier life. The successful use of CPAP will help you breathe easier, sleep better and live healthier. You can choose CPAP equipment from any durable medical equipment provider that meets your needs.  Using CPAP can be a positive experience if you keep these dailey points in mind:  1. Commitment  CPAP is not a quick fix for your problem. It involves a long-term commitment to improve your sleep and your health.    2. Communication  Stay in close communication with both your sleep doctor and your CPAP supplier. Ask lots of questions and seek help when you need it.    3. Consistency  Use CPAP all night, every night and for every nap. You will receive the maximum health benefits from CPAP when you use it every time that you sleep. This will also make it easier for your body to adjust to the treatment.    4. Correction  The first machine and mask that you try may not be the best ones for you. Work with your sleep doctor and your CPAP supplier to make corrections to your equipment selection. Ask about trying a different type of machine or mask if you have ongoing problems. Make sure that your mask is a good fit and learn to use your equipment properly.    5. Challenge  Tell a family member or close friend to ask you each morning if you used your CPAP the previous night. Have someone to challenge you to give it your best effort.    6. Connection   Your adjustment to CPAP will be easier if you are able to connect with others who use the same treatment. Ask your sleep doctor if there is a support group in your area for people who have sleep apnea, or look for one on the Internet.  7. Comfort   Increase your level of comfort by using a saline spray, decongestant or heated humidifier if CPAP irritates your nose, mouth or throat. Use your unit's \"ramp\" setting to slowly get used to the air pressure level. There may be soft pads you can buy that will fit over your mask straps. " Look on www.CPAP.com for accessories that can help make CPAP use more comfortable.  8. Cleaning   Clean your mask, tubing and headgear on a regular basis. Put this time in your schedule so that you don't forget to do it. Check and replace the filters for your CPAP unit and humidifier.    9. Completion   Although you are never finished with CPAP therapy, you should reward yourself by celebrating the completion of your first month of treatment. Expect this first month to be your hardest period of adjustment. It will involve some trial and error as you find the machine, mask and pressure settings that are right for you.    10. Continuation  After your first month of treatment, continue to make a daily commitment to use your CPAP all night, every night and for every nap.    CPAP-Tips to starting with success:  Begin using your CPAP for short periods of time during the day while you watch TV or read.    Use CPAP every night and for every nap. Using it less often reduces the health benefits and makes it harder for your body to get used to it.    Make small adjustments to your mask, tubing, straps and headgear until you get the right fit. Tightening the mask may actually worsen the leak.  If it leaves significant marks on your face or irritates the bridge of your nose, it may not be the best mask for you.  Speak with the person who supplied the mask and consider trying other masks. Insurances will allow you to try different masks during the first month of starting CPAP.  Insurance also covers a new mask, hose and filter about every 6 months.    Use a saline nasal spray to ease mild nasal congestion. Neti-Pot or saline nasal rinses may also help. Nasal gel sprays can help reduce nasal dryness.  Biotene mouthwash can be helpful to protect your teeth if you experience frequent dry mouth.  Dry mouth may be a sign of air escaping out of your mouth or out of the mask in the case of a full face mask.  Speak with your provider if  you expect that is the case.     Take a nasal decongestant to relieve more severe nasal or sinus congestion.  Do not use Afrin (oxymetazoline) nasal spray more than 3 days in a row.  Speak with your sleep doctor if your nasal congestion is chronic.    Use a heated humidifier that fits your CPAP model to enhance your breathing comfort. Adjust the heat setting up if you get a dry nose or throat, down if you get condensation in the hose or mask.  Position the CPAP lower than you so that any condensation in the hose drains back into the machine rather than towards the mask.    Try a system that uses nasal pillows if traditional masks give you problems.    Clean your mask, tubing and headgear once a week. Make sure the equipment dries fully.    Regularly check and replace the filters for your CPAP unit and humidifier.    Work closely with your sleep provider and your CPAP supplier to make sure that you have the machine, mask and air pressure setting that works best for you. It is better to stop using it and call your provider to solve problems than to lay awake all night frustrated with the device.    BESIDES CPAP, WHAT OTHER THERAPIES ARE THERE?      Positioning Device  Positioning devices are generally used when sleep apnea is mild and only occurs on your back.This example shows a pillow that straps around the waist. It may be appropriate for those whose sleep study shows milder sleep apnea that occurs primarily when lying flat on one's back. Preliminary studies have shown benefit but effectiveness at home may need to be verified by a home sleep test. These devices are generally not covered by medical insurance.                      Oral Appliance  What is oral appliance therapy?  An oral appliance is a small acrylic device that fits over the upper and lower teeth or tongue (similar to an orthodontic retainer or a mouth guard). This device slightly advances the lower jaw or tongue, which moves the base of the tongue  forward, opens the airway, improves breathing and can effectively treat snoring and obstructive sleep apnea sleep apnea. The appliance is fabricated and customized by a qualified dentist with experience in treating snoring and sleep apnea. Oral appliances are usually well tolerated and have relatively high compliance by patients1, 2, 3.  When is an oral appliance indicated?  Oral appliance therapy is recommended as a first-line treatment for patients with primary snoring, mild sleep apnea, and for patients with moderate sleep apnea who prefer appliance therapy to use of CPAP4, 5. Severity of sleep apnea is determined by sleep testing and is based on the number of respiratory events per hour of sleep.   How successful is oral appliance therapy?  The success rate of oral appliance therapy in patients with mild sleep apnea is 75-80% while in patients with moderate sleep apnea it is 50-70%. The chance of success in patients with severe sleep apnea is 40-50%. The research also shows that oral appliances have a beneficial effect on the cardiovascular health of RODRIGO patients at the same magnitude as CPAP therapy7.  Oral appliances should be a second-line treatment in cases of severe sleep apnea, but if not completely successful then a combination therapy utilizing CPAP plus oral appliance therapy may be effective. Oral appliances tend to be effective in a broad range of patients although studies show that the patients who have the highest success are females, younger patients, those with milder disease, and less severe obesity. 3, 6.   The chances of success are lower in patients who have more severe RODRIGO, are older, and those who are morbidly obese.     Example of an oral appliance   Finding a dentist that practices dental sleep medicine  Specific training is available through the American Academy of Dental Sleep Medicine for dentists interested in working in the field of sleep. To find a dentist who is educated in the  field of sleep and the use of oral appliances, near you, visit the Web site of the American Academy of Dental Sleep Medicine; also see   http://www.accpstorage.org/newOrganization/patients/oralAppliances.pdf  To search for a dentist certified in these practices:  Http://aadsm.org/FindADentist.aspx?1  1. Pretty, et al. Objectively measured vs self-reported compliance during oral appliance therapy for sleep-disordered breathing. Chest 2013; 144(5): 7849-5819.  2. Daly et al. Objective measurement of compliance during oral appliance therapy for sleep-disordered breathing. Thorax 2013; 68(1): 91-96.  3. Kell et al. Mandibular advancement devices in 620 men and women with RODRIGO and snoring: tolerability and predictors of treatment success. Chest 2004; 125: 9569-5270.  4. Ronit, et al. Oral appliances for snoring and RODRIGO: a review. Sleep 2006; 29: 244-262.  5. Alysia et al. Oral appliance treatment for RODRIGO: an update. J Clin Sleep Med 2014; 10(2): 215-227.  6. Moira, et al. Predictors of OSAH treatment outcome. J Dent Res 2007; 86: 2981-6172.      Weight Loss:    Weight management is a personal decision.  If you are interested in exploring weight loss strategies, the following discussion covers the impact on weight loss on sleep apnea and the approaches that may be successful.    Weight loss decreases severity of sleep apnea in most people with obesity. For those with mild obesity who have developed snoring with weight gain, even 15-30 pound weight loss can improve and occasionally eliminate sleep apnea.  Structured and life-long dietary and health habits are necessary to lose weight and keep healthier weight levels.     Though there may be significant health benefits from weight loss, long-term weight loss is very difficult to achieve- studies show success with dietary management in less than 10% of people. In addition, substantial weight loss may require years of dietary control and may be  difficult if patients have severe obesity. In these cases, surgical management may be considered.  Finally, older individuals who have tolerated obesity without health complications may be less likely to benefit from weight loss strategies.    Your BMI is Body mass index is 44.93 kg/(m^2).  Body mass index (BMI) is one way to tell whether you are at a healthy weight, overweight, or obese. It measures your weight in relation to your height.  A BMI of 18.5 to 24.9 is in the healthy range. A person with a BMI of 25 to 29.9 is considered overweight, and someone with a BMI of 30 or greater is considered obese. More than two-thirds of American adults are considered overweight or obese.  Being overweight or obese increases the risk for further weight gain. Excess weight may lead to heart disease and diabetes.  Creating and following plans for healthy eating and physical activity may help you improve your health.  Weight control is part of healthy lifestyle and includes exercise, emotional health, and healthy eating habits. Careful eating habits lifelong are the mainstay of weight control. Though there are significant health benefits from weight loss, long-term weight loss with diet alone may be very difficult to achieve- studies show long-term success with dietary management in less than 10% of people. Attaining a healthy weight may be especially difficult to achieve in those with severe obesity. In some cases, medications, devices and surgical management might be considered.  What can you do?  If you are overweight or obese and are interested in methods for weight loss, you should discuss this with your provider.     Consider reducing daily calorie intake by 500 calories.     Keep a food journal.     Avoiding skipping meals, consider cutting portions instead.    Diet combined with exercise helps maintain muscle while optimizing fat loss. Strength training is particularly important for building and maintaining muscle mass.  Exercise helps reduce stress, increase energy, and improves fitness. Increasing exercise without diet control, however, may not burn enough calories to loose weight.       Start walking three days a week 10-20 minutes at a time    Work towards walking thirty minutes five days a week     Eventually, increase the speed of your walking for 1-2 minutes at time    In addition, we recommend that you review healthy lifestyles and methods for weight loss available through the National Institutes of Health patient information sites:  http://win.niddk.nih.gov/publications/index.htm    And look into health and wellness programs that may be available through your health insurance provider, employer, local community center, or aleksandr club.    Weight management plan: Patient was referred to their PCP to discuss a diet and exercise plan.    Surgery:    Upper Airway Surgery for RODRIGO  Surgery for RODRIGO is a second-line treatment option in the management of sleep apnea.  Surgery should be considered for patients who are having a difficult time tolerating CPAP.    Surgery for RODRIGO is directed at areas that are responsible for narrowing or complete obstruction of the airway during sleep.  There are a wide range of procedures available to enlarge and/or stabilize the airway to prevent blockage of breathing in the three major areas where it can occur: the palate, tongue, and nasal regions.  Successful surgical treatment depends on the accurate identification of the factors responsible for obstructive sleep apnea in each person.  A personalized approach is required because there is no single treatment that works well for everyone.  Because of anatomic variation, consultation with an examination by a sleep surgeon is a critical first step in determining what surgical options are best for each patient.  In some cases, examination during sedation may be recommended in order to guide the selection of procedures.  Patients will be counseled about  risks and benefits as well as the typical recovery course after surgery. Surgery is typically not a cure for a person s RODRIGO.  However, surgery will often significantly improve one s RODRIGO severity (termed  success rate ).  Even in the absence of a cure, surgery will decrease the cardiovascular risk associated with OSA7; improve overall quality of life8 (sleepiness, functionality, sleep quality, etc).          Palate Procedures:  Patients with RODRIGO often have narrowing of their airway in the region of their tonsils and uvula.  The goals of palate procedures are to widen the airway in this region as well as to help the tissues resist collapse.  Modern palate procedure techniques focus on tissue conservation and soft tissue rearrangement, rather than tissue removal.  Often the uvula is preserved in this procedure. Residual sleep apnea is common in patient after pharyngoplasty with an average reduction in sleep apnea events of 33%2.      Tongue Procedures:  While patients are awake, the muscles that surround the throat are active and keep this region open for breathing. These muscles relax during sleep, allowing the tongue and other structures to collapse and block breathing.  There are several different tongue procedures available.  Selection of a tongue base procedure depends on characteristics seen on physical exam.  Generally, procedures are aimed at removing bulky tissues in this area or preventing the back of the tongue from falling back during sleep.  Success rates for tongue surgery range from 50-62%3.    Hypoglossal Nerve Stimulation:  Hypoglossal nerve stimulation has recently received approval from the [place] Food and Drug Administration for the treatment of obstructive sleep apnea.  This is based on research showing that the system was safe and effective in treating sleep apnea6.  Results showed that the median AHI score decreased 68%, from 29.3 to 9.0. This therapy uses an implant system that senses  breathing patterns and delivers mild stimulation to airway muscles, which keeps the airway open during sleep.  The system consists of three fully implanted components: a small generator (similar in size to a pacemaker), a breathing sensor, and a stimulation lead.  Using a small handheld remote, a patient turns the therapy on before bed and off upon awakening.    Candidates for this device must be greater than 22 years of age, have moderate to severe RODRIGO (AHI between 20-65), BMI less than 32, have tried CPAP/oral appliance without success, and have appropriate upper airway anatomy (determined by a sleep endoscopy performed by Dr. Thomson).    Hypoglossal Nerve Stimulation Pathway:    The sleep surgeon s office will work with the patient through the insurance prior-authorization process (including communications and appeals).    Nasal Procedures:  Nasal obstruction can interfere with nasal breathing during the day and night.  Studies have shown that relief of nasal obstruction can improve the ability of some patients to tolerate positive airway pressure therapy for obstructive sleep apnea1.  Treatment options include medications such as nasal saline, topical corticosteroid and antihistamine sprays, and oral medications such as antihistamines or decongestants. Non-surgical treatments can include external nasal dilators for selected patients. If these are not successful by themselves, surgery can improve the nasal airway either alone or in combination with these other options.      Combination Procedures:  Combination of surgical procedures and other treatments may be recommended, particularly if patients have more than one area of narrowing or persistent positional disease.  The success rate of combination surgery ranges from 66-80%2,3.      1. Amish YOO. The Role of the Nose in Snoring and Obstructive Sleep Apnoea: An Update.  Eur Arch Otorhinolaryngol. 2011; 268: 1365-73.  2.  Jesús SAAVEDRA; Johnie HILL; Adonis KELLY;  Pallanch JF; Marlee MB; Marlen SG; Rosy JONES. Surgical modifications of the upper airway for obstructive sleep apnea in adults: a systematic review and meta-analysis. SLEEP 2010;33(10):8985-8579. Jacobo VELÁZQUEZ. Hypopharyngeal surgery in obstructive sleep apnea: an evidence-based medicine review.  Arch Otolaryngol Head Neck Surg. 2006 Feb;132(2):206-13.  3. Josiah YH1, Indu Y, Pantera JESSICA. The efficacy of anatomically based multilevel surgery for obstructive sleep apnea. Otolaryngol Head Neck Surg. 2003 Oct;129(4):327-35.  4. Jacobo VELÁZQUEZ, Goldberg A. Hypopharyngeal Surgery in Obstructive Sleep Apnea: An Evidence-Based Medicine Review. Arch Otolaryngol Head Neck Surg. 2006 Feb;132(2):206-13.  5. Richy PJ et al. Upper-Airway Stimulation for Obstructive Sleep Apnea.  N Engl J Med. 2014 Jan 9;370(2):139-49.  6. Angelic Y et al. Increased Incidence of Cardiovascular Disease in Middle-aged Men with Obstructive Sleep Apnea. Am J Respir Crit Care Med; 2002 166: 159-165  7. Aragon EM et al. Studying Life Effects and Effectiveness of Palatopharyngoplasty (SLEEP) study: Subjective Outcomes of Isolated Uvulopalatopharyngoplasty. Otolaryngol Head Neck Surg. 2011; 144: 623-631.    Stress, tension, and anxiety can be big factors contributing to insomnia.  If you can t relax your mind and body, then you won t be able to sleep.  You would likely benefit from trying some of the relaxation exercises that we ve assembled below.  These are all internet based links.  If you don t have or use a computer, you may benefit from one of the stress management books listed below that you can get at your public library or at a local bookstore for a relatively low price.      Copy and paste into web browser address window   https://www.StorkUp.com.com/watch?v=Sdao2aoQfOm    Progressive Muscle Relaxation (PMR):     http://www.JK-Group.Vontu/progressive-muscle-relaxation-exercise.html      http://studentsupport.[facility]/counseling/resources/self-help/relaxation-and-stress-management/   Deep Breathing Exercises:    http://www.Micropoint Technologies/breathing-awareness.html     Meditation:     wwwIsowalk    www.TipprguidedBiopsych Health SystemsmeditationBiopsych Health Systemssite.Freepath You may have to pay for some of these resources.    Guided Imagery:    http://www.Micropoint Technologies/guided-imagery-scripts.html     http://Exinda/library/lsrerlhevl-hahitb-hwdwqkr/    [place] site under construction : http://www.Bluejacket.org/Services/SleepMedicine/Audio/index.htm                Follow-ups after your visit        Future tests that were ordered for you today     Open Future Orders        Priority Expected Expires Ordered    ABG-Blood Gas Arterial ([facility]) Routine  4/1/2017 1/31/2017    Comprehensive Sleep Study Routine  7/30/2017 1/31/2017            Who to contact     If you have questions or need follow up information about today's clinic visit or your schedule please contact [place] [facility] directly at [phone].  Normal or non-critical lab and imaging results will be communicated to you by MyChart, letter or phone within 4 business days after the clinic has received the results. If you do not hear from us within 7 days, please contact the clinic through [email or web address] or phone. If you have a critical or abnormal lab result, we will notify you by phone as soon as possible.  Submit refill requests through Broadcastr or call your pharmacy and they will forward the refill request to us. Please allow 3 business days for your refill to be completed.          Additional Information About Your Visit        [email or web address] Information     Broadcastr gives you secure access to your electronic health record. If you see a primary care provider, you can also send messages to your care team and make appointments. If you have questions, please call your primary care clinic.  If you  "do not have a primary care provider, please call [phone] and they will assist you.        Care EveryWhere ID     This is your Care EveryWhere ID. This could be used by other organizations to access your [place] medical records  [id number]        Your Vitals Were     Pulse Height BMI (Body Mass Index) Pulse Oximetry Last Period       96 1.651 m (5' 5\") 44.93 kg/m2 96% 12/26/2016 (Approximate)        Blood Pressure from Last 3 Encounters:   01/31/17 137/77   01/16/17 132/84   12/29/15 118/66    Weight from Last 3 Encounters:   01/31/17 122.471 kg (270 lb)   01/16/17 120.838 kg (266 lb 6.4 oz)   12/29/15 106.935 kg (235 lb 12 oz)              We Performed the Following     SLEEP EVALUATION & MANAGEMENT REFERRAL - ADULT          Today's Medication Changes          These changes are accurate as of: 1/31/17 10:58 AM.  If you have any questions, ask your nurse or doctor.               Start taking these medicines.        Dose/Directions    zolpidem 5 MG tablet   Commonly known as:  AMBIEN   Used for:  RODRIGO (obstructive sleep apnea)   Started by:  Nacho Simmons PA-C        Take tablet by mouth 15 minutes prior to sleep, for Sleep Study   Quantity:  1 tablet   Refills:  0            Where to get your medicines      Some of these will need a paper prescription and others can be bought over the counter.  Ask your nurse if you have questions.     Bring a paper prescription for each of these medications    - zolpidem 5 MG tablet             Primary Care Provider Office Phone # Fax #    Esthela Velasquez PA-C [phone]       [address] [address]        Thank you!     Thank you for choosing [place] [facility]  for your care. Our goal is always to provide you with excellent care. Hearing back from our patients is one way we can continue to improve our services. Please take a few minutes to complete the written survey that you may receive in the mail after " your visit with us. Thank you!             Your Updated Medication List - Protect others around you: Learn how to safely use, store and throw away your medicines at www.disposemymeds.org.          This list is accurate as of: 1/31/17 10:58 AM.  Always use your most recent med list.                   Brand Name Dispense Instructions for use    cyclobenzaprine 10 MG tablet    FLEXERIL    30 tablet    Take 1 tablet (10 mg) by mouth daily as needed for muscle spasms       etonogestrel-ethinyl estradiol 0.12-0.015 MG/24HR vaginal ring    NUVARING    1 each    Place 1 ring every 21 days then remove for 1 week. Needs appointment for further refills       MOTRIN IB PO      Take 600 mg by mouth       naproxen 500 MG tablet    NAPROSYN    60 tablet    Take 1 tablet (500 mg) by mouth 2 times daily (with meals) As needed       zolpidem 5 MG tablet    AMBIEN    1 tablet    Take tablet by mouth 15 minutes prior to sleep, for Sleep Study

## 2017-02-14 ENCOUNTER — MYC REFILL (OUTPATIENT)
Dept: FAMILY MEDICINE | Facility: OTHER | Age: 32
End: 2017-02-14

## 2017-02-14 DIAGNOSIS — Z30.40 ENCOUNTER FOR SURVEILLANCE OF CONTRACEPTIVES: ICD-10-CM

## 2017-02-14 RX ORDER — ETONOGESTREL AND ETHINYL ESTRADIOL VAGINAL RING .015; .12 MG/D; MG/D
RING VAGINAL
Qty: 1 EACH | Refills: 1 | Status: CANCELLED | OUTPATIENT
Start: 2017-02-14

## 2017-02-15 NOTE — TELEPHONE ENCOUNTER
Message from Essenza Softwarehart:  Original authorizing provider: CLAIRE Lundberg CNP would like a refill of the following medications:  etonogestrel-ethinyl estradiol (NUVARING) 0.12-0.015 MG/24HR vaginal ring [CLAIRE Lundberg CNP]    Preferred pharmacy: [facility] [address] [place] [id number] [facility]    Comment:

## 2017-02-15 NOTE — TELEPHONE ENCOUNTER
etonogestrel-ethinyl estradiol (NUVARING) 0.12-0.015 MG/24HR vaginal ring  Last Written Prescription Date: 01/16/17  Last Fill Quantity: 1,  # refills: 1   Last Office Visit with [facility], JOSEPHINE or [facility] prescribing provider: 01/16/17

## 2017-02-16 NOTE — TELEPHONE ENCOUNTER
Last sent 01/16/2017 #1, 1 refill.  Sent mychart to patient to request refill from pharmacy.  Radha Cummings RN

## 2017-04-21 ENCOUNTER — TELEPHONE (OUTPATIENT)
Dept: FAMILY MEDICINE | Facility: OTHER | Age: 32
End: 2017-04-21

## 2017-04-21 NOTE — TELEPHONE ENCOUNTER
Summary:    Patient is due/failing the following:   PAP, PHQ9 and PHYSICAL    Action needed:   Patient needs office visit for PAP/PE. and Patient needs to do PHQ9.    Type of outreach:    phone no answer or voicemail. MyChart letter sent.    Questions for provider review:    None                                                                                                                                    Tasia Jaen       Chart routed to Care Team .        Panel Management Review      Patient has the following on her problem list:     Depression / Dysthymia review  PHQ-9 SCORE 11/17/2014 4/6/2015 1/16/2017   Total Score 14 15 -   Total Score MyChart - - 16 (Moderately severe depression)      Patient is due for:  PHQ9      Composite cancer screening  Chart review shows that this patient is due/due soon for the following Pap Smear

## 2017-05-03 NOTE — PATIENT INSTRUCTIONS
Start Phentermine 1/2 tablet every morning for 2-5 days.    Nuvaring refills sent.    Jody Coughlin, RICHARD-C  [phone]    Preventive Health Recommendations  Female Ages 26 - 39  Yearly exam:   See your health care provider every year in order to    Review health changes.     Discuss preventive care.      Review your medicines if you your doctor has prescribed any.    Until age 30: Get a Pap test every three years (more often if you have had an abnormal result).    After age 30: Talk to your doctor about whether you should have a Pap test every 3 years or have a Pap test with HPV screening every 5 years.   You do not need a Pap test if your uterus was removed (hysterectomy) and you have not had cancer.  You should be tested each year for STDs (sexually transmitted diseases), if you're at risk.   Talk to your provider about how often to have your cholesterol checked.  If you are at risk for diabetes, you should have a diabetes test (fasting glucose).  Shots: Get a flu shot each year. Get a tetanus shot every 10 years.   Nutrition:     Eat at least 5 servings of fruits and vegetables each day.    Eat whole-grain bread, whole-wheat pasta and brown rice instead of white grains and rice.    Talk to your provider about Calcium and Vitamin D.     Lifestyle    Exercise at least 150 minutes a week (30 minutes a day, 5 days of the week). This will help you control your weight and prevent disease.    Limit alcohol to one drink per day.    No smoking.     Wear sunscreen to prevent skin cancer.    See your dentist every six months for an exam and cleaning.

## 2017-05-03 NOTE — PROGRESS NOTES
SUBJECTIVE:     CC: Geneva Cooley is an 31 year old woman who presents for preventive health visit.     Physical   Annual:     Getting at least 3 servings of Calcium per day::  Yes    Bi-annual eye exam::  Yes    Dental care twice a year::  NO    Sleep apnea or symptoms of sleep apnea::  Excessive snoring    Diet::  Regular (no restrictions)    Frequency of exercise::  1 day/week    Duration of exercise::  15-30 minutes    Taking medications regularly::  Yes    Medication side effects::  None    Additional concerns today::  YES (Sinus pressure)    Answers for HPI/ROS submitted by the patient on 5/8/2017   Q1: Little interest or pleasure in doing things: 1=Several days  Q2: Feeling down, depressed or hopeless: 0=Not at all  PHQ-2 Score: 1          Acute Illness   Acute illness concerns: Sinus pressure  Onset: 3 days    Fever: no     Chills/Sweats: no    Headache (location?): no    Sinus Pressure:YES    Conjunctivitis:  no    Ear Pain: no    Rhinorrhea: YES    Congestion: YES    Sore Throat: no     Cough: YES-productive of yellow sputum    Wheeze: no    Decreased Appetite: no    Nausea: YES    Vomiting: no    Diarrhea:  no    Dysuria/Freq.: no    Fatigue/Achiness: YES- Achiness    Sick/Strep Exposure: YES- possible     Therapies Tried and outcome: Nyquil - helped with symptoms      Today's PHQ-2 Score:   PHQ-2 ( 1999 Pfizer) 5/8/2017   Q1: Little interest or pleasure in doing things -   Q2: Feeling down, depressed or hopeless -   PHQ-2 Score -   Little interest or pleasure in doing things Several days   Feeling down, depressed or hopeless Not at all   PHQ-2 Score 1       Abuse: Current or Past(Physical, Sexual or Emotional)- No  Do you feel safe in your environment - Yes    Social History   Substance Use Topics     Smoking status: Never Smoker     Smokeless tobacco: Never Used     Alcohol use 0.0 oz/week     0 Standard drinks or equivalent per week      Comment: rarely     The patient does not drink >3 drinks per  day nor >7 drinks per week.    Recent Labs   Lab Test  12   1155   CHOL  187   HDL  39*   LDL  110   TRIG  191*   CHOLHDLRATIO  5.0       Reviewed orders with patient.  Reviewed health maintenance and updated orders accordingly - Yes    Mammo Decision Support:  Mammogram not appropriate for this patient based on age.    Pertinent mammograms are reviewed under the imaging tab.  History of abnormal Pap smear: NO - age 30-65 PAP every 5 years with negative HPV co-testing recommended    Reviewed and updated as needed this visit by clinical staff  Tobacco  Med Hx  Surg Hx  Fam Hx  Soc Hx        Reviewed and updated as needed this visit by Provider        Past Medical History:   Diagnosis Date     Depression      Shingles outbreak       Past Surgical History:   Procedure Laterality Date     NO HISTORY OF SURGERY       Obstetric History       T1      TAB0   SAB0   E0   M0   L1       # Outcome Date GA Lbr Boby/2nd Weight Sex Delivery Anes PTL Lv   1 Term 04/28/10 40w0d  7 lb 11 oz (3.487 kg) F Vag-Spont   Y      Name: Tia          ROS:  C: NEGATIVE for fever, chills, change in weight  I: NEGATIVE for worrisome rashes, moles or lesions  E: NEGATIVE for vision changes or irritation  ENT: NEGATIVE for ear, mouth and throat problems  R: NEGATIVE for significant cough or SOB  B: NEGATIVE for masses, tenderness or discharge  CV: NEGATIVE for chest pain, palpitations or peripheral edema  GI: NEGATIVE for nausea, abdominal pain, heartburn, or change in bowel habits  : NEGATIVE for unusual urinary or vaginal symptoms. Periods are regular.  M: NEGATIVE for significant arthralgias or myalgia  N: NEGATIVE for weakness, dizziness or paresthesias  E: NEGATIVE for temperature intolerance, skin/hair changes  P: NEGATIVE for changes in mood or affect    Problem list, Medication list, Allergies, and Medical/Social/Surgical histories reviewed in [facility] and updated as appropriate.  Labs reviewed in  "EPIC  OBJECTIVE:     /88 (BP Location: Left arm, Patient Position: Chair, Cuff Size: Adult Large)  Pulse 52  Temp 97.8  F (36.6  C) (Oral)  Resp 16  Ht 5' 5\" (1.651 m)  Wt 271 lb 6.4 oz (123.1 kg)  BMI 45.16 kg/m2  EXAM:  GENERAL: healthy, alert and no distress  EYES: Eyes grossly normal to inspection, PERRL and conjunctivae and sclerae normal  HENT: ear canals and TM's normal, nose and mouth without ulcers or lesions  NECK: no adenopathy, no asymmetry, masses, or scars and thyroid normal to palpation  RESP: lungs clear to auscultation - no rales, rhonchi or wheezes  BREAST: normal without masses, tenderness or nipple discharge and no palpable axillary masses or adenopathy  CV: regular rate and rhythm, normal S1 S2, no S3 or S4, no murmur, click or rub, no peripheral edema and peripheral pulses strong  ABDOMEN: soft, nontender, no hepatosplenomegaly, no masses and bowel sounds normal   (female): normal female external genitalia, normal urethral meatus, vaginal mucosa pink, moist, well rugated, and normal cervix/adnexa/uterus without masses or discharge  MS: no gross musculoskeletal defects noted, no edema  SKIN: no suspicious lesions or rashes  NEURO: Normal strength and tone, mentation intact and speech normal  PSYCH: mentation appears normal, affect normal/bright    ASSESSMENT/PLAN:     1. Encounter for routine adult health examination without abnormal findings    2. Morbid obesity due to excess calories (H)  Patient unable to lose weight, has been working on diet adjustments but no loss of weight.  Discussed weight loss options and patient is interested in phentermine.  Discussed side effects.  Asked patient to monitor BP outside of clinic periodically.  May stop at clinic for BP checks.  Follow up in 6 weeks.  - phentermine (ADIPEX-P) 37.5 MG tablet; Take 1 tablet (37.5 mg) by mouth every morning (before breakfast)  Dispense: 30 tablet; Refill: 1    3. Major depressive disorder, single episode, " "moderate (H)/4. Anxiety  Patient's symptoms are stable at this time.  Not currently needing antidepressant medication.      5. Encounter for surveillance of contraceptives  - etonogestrel-ethinyl estradiol (NUVARING) 0.12-0.015 MG/24HR vaginal ring; Place 1 ring every 21 days then remove for 1 week. Needs appointment for further refills  Dispense: 1 each; Refill: 10    6. Screening for malignant neoplasm of cervix  - HPV High Risk Types DNA Cervical  - Pap imaged thin layer screen with HPV - recommended age 30 - 65 years (select HPV order below)    COUNSELING:  Reviewed preventive health counseling, as reflected in patient instructions       Regular exercise       Healthy diet/nutrition       Contraception    BP Screening:   Last 3 BP Readings:    BP Readings from Last 3 Encounters:   05/08/17 118/82   01/31/17 137/77   01/16/17 132/84       The following was recommended to the patient:  Re-screen BP within a year and recommended lifestyle modifications     reports that she has never smoked. She has never used smokeless tobacco.    Estimated body mass index is 45.16 kg/(m^2) as calculated from the following:    Height as of this encounter: 5' 5\" (1.651 m).    Weight as of this encounter: 271 lb 6.4 oz (123.1 kg).   Weight management plan: Discussed healthy diet and exercise guidelines and patient will follow up in 1 month in clinic to re-evaluate. Specific weight management program called phentermine discussed and follow up in 1 month in clinic to re-evaluate.    Counseling Resources:  ATP IV Guidelines  Pooled Cohorts Equation Calculator  Breast Cancer Risk Calculator  FRAX Risk Assessment  ICSI Preventive Guidelines  Dietary Guidelines for Americans, 2010  USDA's MyPlate  ASA Prophylaxis  Lung CA Screening    CLAIRE Lundberg [facility]"

## 2017-05-08 ENCOUNTER — OFFICE VISIT (OUTPATIENT)
Dept: FAMILY MEDICINE | Facility: OTHER | Age: 32
End: 2017-05-08
Payer: COMMERCIAL

## 2017-05-08 VITALS
DIASTOLIC BLOOD PRESSURE: 82 MMHG | RESPIRATION RATE: 16 BRPM | SYSTOLIC BLOOD PRESSURE: 118 MMHG | BODY MASS INDEX: 45.22 KG/M2 | WEIGHT: 271.4 LBS | HEIGHT: 65 IN | HEART RATE: 52 BPM | TEMPERATURE: 97.8 F

## 2017-05-08 DIAGNOSIS — F41.9 ANXIETY: ICD-10-CM

## 2017-05-08 DIAGNOSIS — F32.1 MAJOR DEPRESSIVE DISORDER, SINGLE EPISODE, MODERATE (H): ICD-10-CM

## 2017-05-08 DIAGNOSIS — E66.01 MORBID OBESITY DUE TO EXCESS CALORIES (H): ICD-10-CM

## 2017-05-08 DIAGNOSIS — Z00.00 ENCOUNTER FOR ROUTINE ADULT HEALTH EXAMINATION WITHOUT ABNORMAL FINDINGS: Primary | ICD-10-CM

## 2017-05-08 DIAGNOSIS — Z30.40 ENCOUNTER FOR SURVEILLANCE OF CONTRACEPTIVES: ICD-10-CM

## 2017-05-08 DIAGNOSIS — Z12.4 SCREENING FOR MALIGNANT NEOPLASM OF CERVIX: ICD-10-CM

## 2017-05-08 PROCEDURE — G0145 SCR C/V CYTO,THINLAYER,RESCR: HCPCS | Performed by: STUDENT IN AN ORGANIZED HEALTH CARE EDUCATION/TRAINING PROGRAM

## 2017-05-08 PROCEDURE — 87624 HPV HI-RISK TYP POOLED RSLT: CPT | Performed by: STUDENT IN AN ORGANIZED HEALTH CARE EDUCATION/TRAINING PROGRAM

## 2017-05-08 PROCEDURE — 99395 PREV VISIT EST AGE 18-39: CPT | Performed by: STUDENT IN AN ORGANIZED HEALTH CARE EDUCATION/TRAINING PROGRAM

## 2017-05-08 PROCEDURE — G0476 HPV COMBO ASSAY CA SCREEN: HCPCS | Performed by: STUDENT IN AN ORGANIZED HEALTH CARE EDUCATION/TRAINING PROGRAM

## 2017-05-08 RX ORDER — PHENTERMINE HYDROCHLORIDE 37.5 MG/1
37.5 TABLET ORAL
Qty: 30 TABLET | Refills: 1 | Status: SHIPPED | OUTPATIENT
Start: 2017-05-08 | End: 2017-08-28

## 2017-05-08 RX ORDER — PHENTERMINE HYDROCHLORIDE 15 MG/1
15 CAPSULE ORAL EVERY MORNING
Qty: 30 CAPSULE | Refills: 0 | Status: CANCELLED | OUTPATIENT
Start: 2017-05-08

## 2017-05-08 RX ORDER — ETONOGESTREL AND ETHINYL ESTRADIOL VAGINAL RING .015; .12 MG/D; MG/D
RING VAGINAL
Qty: 1 EACH | Refills: 10 | Status: ON HOLD | OUTPATIENT
Start: 2017-05-08 | End: 2018-02-05

## 2017-05-08 RX ORDER — ALBUTEROL SULFATE 90 UG/1
AEROSOL, METERED RESPIRATORY (INHALATION)
Refills: 0 | COMMUNITY
Start: 2017-02-12 | End: 2017-08-28

## 2017-05-08 ASSESSMENT — PAIN SCALES - GENERAL: PAINLEVEL: NO PAIN (0)

## 2017-05-08 ASSESSMENT — ANXIETY QUESTIONNAIRES
GAD7 TOTAL SCORE: 11
7. FEELING AFRAID AS IF SOMETHING AWFUL MIGHT HAPPEN: 0 = NOT AT ALL

## 2017-05-08 NOTE — MR AVS SNAPSHOT
After Visit Summary   5/8/2017    Geneva Cooley    MRN: [id number]           Patient Information     Date Of Birth          1985        Visit Information        Provider Department      5/8/2017 9:50 AM Jody Coughlin APRN [facility]        Today's Diagnoses     Screening for malignant neoplasm of cervix    -  1    Morbid obesity due to excess calories (H)        Encounter for surveillance of contraceptives          Care Instructions    Start Phentermine 1/2 tablet every morning for 2-5 days.    Nuvaring refills sent.    Jody Coughlin, NP-C  [phone]    Preventive Health Recommendations  Female Ages 26 - 39  Yearly exam:   See your health care provider every year in order to    Review health changes.     Discuss preventive care.      Review your medicines if you your doctor has prescribed any.    Until age 30: Get a Pap test every three years (more often if you have had an abnormal result).    After age 30: Talk to your doctor about whether you should have a Pap test every 3 years or have a Pap test with HPV screening every 5 years.   You do not need a Pap test if your uterus was removed (hysterectomy) and you have not had cancer.  You should be tested each year for STDs (sexually transmitted diseases), if you're at risk.   Talk to your provider about how often to have your cholesterol checked.  If you are at risk for diabetes, you should have a diabetes test (fasting glucose).  Shots: Get a flu shot each year. Get a tetanus shot every 10 years.   Nutrition:     Eat at least 5 servings of fruits and vegetables each day.    Eat whole-grain bread, whole-wheat pasta and brown rice instead of white grains and rice.    Talk to your provider about Calcium and Vitamin D.     Lifestyle    Exercise at least 150 minutes a week (30 minutes a day, 5 days of the week). This will help you control your weight and prevent disease.    Limit alcohol to one drink per  "day.    No smoking.     Wear sunscreen to prevent skin cancer.    See your dentist every six months for an exam and cleaning.          Follow-ups after your visit        Who to contact     If you have questions or need follow up information about today's clinic visit or your schedule please contact [facility] directly at [phone].  Normal or non-critical lab and imaging results will be communicated to you by MyChart, letter or phone within 4 business days after the clinic has received the results. If you do not hear from us within 7 days, please contact the clinic through Lumidigmhart or phone. If you have a critical or abnormal lab result, we will notify you by phone as soon as possible.  Submit refill requests through Sistemic or call your pharmacy and they will forward the refill request to us. Please allow 3 business days for your refill to be completed.          Additional Information About Your Visit        MyChart Information     Sistemic gives you secure access to your electronic health record. If you see a primary care provider, you can also send messages to your care team and make appointments. If you have questions, please call your primary care clinic.  If you do not have a primary care provider, please call [phone] and they will assist you.        Care EveryWhere ID     This is your Care EveryWhere ID. This could be used by other organizations to access your [place] medical records  [id number]        Your Vitals Were     Pulse Temperature Respirations Height BMI (Body Mass Index)       52 97.8  F (36.6  C) (Oral) 16 5' 5\" (1.651 m) 45.16 kg/m2        Blood Pressure from Last 3 Encounters:   05/08/17 128/88   01/31/17 137/77   01/16/17 132/84    Weight from Last 3 Encounters:   05/08/17 271 lb 6.4 oz (123.1 kg)   01/31/17 270 lb (122.5 kg)   01/16/17 266 lb 6.4 oz (120.8 kg)              We Performed the Following     HPV High Risk Types DNA Cervical     Pap imaged thin layer screen " with HPV - recommended age 30 - 65 years (select HPV order below)          Today's Medication Changes          These changes are accurate as of: 5/8/17 10:39 AM.  If you have any questions, ask your nurse or doctor.               Start taking these medicines.        Dose/Directions    phentermine 37.5 MG tablet   Commonly known as:  ADIPEX-P   Used for:  Morbid obesity due to excess calories (H)   Started by:  Jody Coughlin APRN CNP        Dose:  37.5 mg   Take 1 tablet (37.5 mg) by mouth every morning (before breakfast)   Quantity:  30 tablet   Refills:  1            Where to get your medicines      Some of these will need a paper prescription and others can be bought over the counter.  Ask your nurse if you have questions.     Bring a paper prescription for each of these medications     etonogestrel-ethinyl estradiol 0.12-0.015 MG/24HR vaginal ring    phentermine 37.5 MG tablet                Primary Care Provider Office Phone # Fax #    CLAIRE Luna [phone]       [address]  [facility] [address]        Thank you!     Thank you for choosing [facility]  for your care. Our goal is always to provide you with excellent care. Hearing back from our patients is one way we can continue to improve our services. Please take a few minutes to complete the written survey that you may receive in the mail after your visit with us. Thank you!             Your Updated Medication List - Protect others around you: Learn how to safely use, store and throw away your medicines at www.disposemymeds.org.          This list is accurate as of: 5/8/17 10:39 AM.  Always use your most recent med list.                   Brand Name Dispense Instructions for use    cyclobenzaprine 10 MG tablet    FLEXERIL    30 tablet    Take 1 tablet (10 mg) by mouth daily as needed for muscle spasms       etonogestrel-ethinyl estradiol 0.12-0.015 MG/24HR  vaginal ring    NUVARING    1 each    Place 1 ring every 21 days then remove for 1 week. Needs appointment for further refills       MOTRIN IB PO      Take 600 mg by mouth       naproxen 500 MG tablet    NAPROSYN    60 tablet    Take 1 tablet (500 mg) by mouth 2 times daily (with meals) As needed       phentermine 37.5 MG tablet    ADIPEX-P    30 tablet    Take 1 tablet (37.5 mg) by mouth every morning (before breakfast)       VENTOLIN  (90 BASE) MCG/ACT Inhaler   Generic drug:  albuterol          zolpidem 5 MG tablet    AMBIEN    1 tablet    Take tablet by mouth 15 minutes prior to sleep, for Sleep Study

## 2017-05-08 NOTE — NURSING NOTE
"Chief Complaint   Patient presents with     Physical     Panel Management     UMBERTO, PHQ, LILIA       Initial /88 (BP Location: Left arm, Patient Position: Chair, Cuff Size: Adult Large)  Pulse 52  Temp 97.8  F (36.6  C) (Oral)  Resp 16  Ht 5' 5\" (1.651 m)  Wt 271 lb 6.4 oz (123.1 kg)  BMI 45.16 kg/m2 Estimated body mass index is 45.16 kg/(m^2) as calculated from the following:    Height as of this encounter: 5' 5\" (1.651 m).    Weight as of this encounter: 271 lb 6.4 oz (123.1 kg).  Medication Reconciliation: complete   Berna Munoz CMA      "

## 2017-05-11 LAB
COPATH REPORT: NORMAL
PAP: NORMAL

## 2017-05-12 LAB
FINAL DIAGNOSIS: NORMAL
HPV HR 12 DNA CVX QL NAA+PROBE: NEGATIVE
HPV16 DNA SPEC QL NAA+PROBE: NEGATIVE
HPV18 DNA SPEC QL NAA+PROBE: NEGATIVE
SPECIMEN DESCRIPTION: NORMAL

## 2017-06-28 ENCOUNTER — OFFICE VISIT (OUTPATIENT)
Dept: SURGERY | Facility: CLINIC | Age: 32
End: 2017-06-28
Payer: COMMERCIAL

## 2017-06-28 VITALS
TEMPERATURE: 98.1 F | WEIGHT: 252.3 LBS | SYSTOLIC BLOOD PRESSURE: 126 MMHG | HEART RATE: 74 BPM | RESPIRATION RATE: 18 BRPM | HEIGHT: 65 IN | DIASTOLIC BLOOD PRESSURE: 82 MMHG | BODY MASS INDEX: 42.03 KG/M2 | OXYGEN SATURATION: 96 %

## 2017-06-28 DIAGNOSIS — Z13.21 SCREENING FOR ENDOCRINE, NUTRITIONAL, METABOLIC AND IMMUNITY DISORDER: ICD-10-CM

## 2017-06-28 DIAGNOSIS — Z13.0 SCREENING FOR IRON DEFICIENCY ANEMIA: ICD-10-CM

## 2017-06-28 DIAGNOSIS — Z13.29 SCREENING FOR ENDOCRINE, NUTRITIONAL, METABOLIC AND IMMUNITY DISORDER: ICD-10-CM

## 2017-06-28 DIAGNOSIS — Z13.0 SCREENING FOR ENDOCRINE, NUTRITIONAL, METABOLIC AND IMMUNITY DISORDER: ICD-10-CM

## 2017-06-28 DIAGNOSIS — E66.01 MORBID OBESITY WITH BMI OF 40.0-44.9, ADULT (H): Primary | ICD-10-CM

## 2017-06-28 DIAGNOSIS — E66.01 MORBID OBESITY WITH BMI OF 40.0-44.9, ADULT (H): ICD-10-CM

## 2017-06-28 DIAGNOSIS — Z13.228 SCREENING FOR ENDOCRINE, NUTRITIONAL, METABOLIC AND IMMUNITY DISORDER: ICD-10-CM

## 2017-06-28 PROCEDURE — 97802 MEDICAL NUTRITION INDIV IN: CPT | Performed by: DIETITIAN, REGISTERED

## 2017-06-28 PROCEDURE — 99204 OFFICE O/P NEW MOD 45 MIN: CPT | Performed by: PHYSICIAN ASSISTANT

## 2017-06-28 RX ORDER — MELOXICAM 15 MG/1
15 TABLET ORAL DAILY
Refills: 1 | COMMUNITY
Start: 2017-04-26 | End: 2017-09-27

## 2017-06-28 NOTE — PROGRESS NOTES
"Name: ROB TOBIAS  : 1985  Gender: Female  MRN: [id number]  Age: 31  INITIAL BARIATRIC NUTRITION ASSESSMENT  REASON FOR VISIT:  ROB TOBIAS is a 31 year old Female presents today for initial nutrition visit for bariatric surgery. She was accompanied by her young daughter.  DIAGNOSIS:  Morbid Obesity.  ANTHROPOMETRICS:  Height: 65 inches  Weight: 252 lbs  BMI: 41.9 kg/m2  CURRENT SUPPLEMENTS:  Multivitamin/Mineral: none  WEIGHT LOSS ATTEMPTS:  Prescription diet medications:   NUTRITION HISTORY:  Meals per day: 3  Snacking: Yes  Breakfast: Cereal with skim milk, scrambled egg in tortilla or Zach bar-within 1 hour of waking  Lunch: subway or pizza or cereal with milk or turkey sandwich-12:00   Supper: spaghetti or tacos or chicken or sandwich-5:00-6:00   Snacks: pretty much whatever is lying around-yogurt with granola or chips  Drinks: water, some pop, juice on rare occasion  Emotional eating: No  Binge eating: No  Night eating: No  Can you afford three meals per day? Yes  Can you afford the $50-60 per month for vitamins? Yes  Comments: patient is most interested in weight loss surgery to \"keep up\" with her 5 year old daughter; in discussing binge eating pt admits she does over eat if she goes too many hours; pt has 2 friends that have had weight loss surgery (one successful the other not successful); recently stopped taking phentermine per Deloris Rome PA-C   DINING OUT HISTORY:  Frequency per week: Nearly every day  Location: fast food, sit-down restaurants  Type of food: sandwiches, tacos, cheeseburgers or salads  PHYSICAL ACTIVITY:  None  NUTRITION DIAGNOSIS:  Patient with excessive oral food/beverage intake related to intake of calorically dense food/beverages at meals and/or snacks as evidenced by BMI of 41.9  INTERVENTION:  Nutrition Prescription: Recommend nutrient/ energy modification   Goals:  Start: 1 multivitamin mineral, 2000 IU Vitamin D, 1200 mg calcium with vitamin D (2-3 separate " doses)  Replace pop with more water or enhanced water  Practice avoiding fluids with meals  Implementation:  Provided written guidelines for Laparoscopic Alin-en-Y Gastric Bypass surgery.  Provided weight loss suggestions.  Explained diet changes that would occur after surgery.  Emphasized importance of diet and lifestyle modifications.  Discussed necessity for chewable multivitamin/ mineral supplements, calcium with vitamin D, vitamin B-12, vitamin D, Iron and vitamin C supplements.  NUTRITION MONITORING AND EVALUATION:  Verbalizes understanding of surgery diet guidelines.  Anticipated compliance: fair-good    FOLLOW UP:  Six months of structured weight loss per insurance requirements (pt to verify coverage).  RD name and number provided.  TIME SPENT WITH PATIENT: 50 minutes  Albert Rocha RD, LD  [facility]  [phone]

## 2017-06-28 NOTE — MR AVS SNAPSHOT
After Visit Summary   6/28/2017    Geneva Cooley    MRN: [id number]           Patient Information     Date Of Birth          1985        Visit Information        Provider Department      6/28/2017 1:00 PM Deloris Rome PA-C [facility] [place] [facility]      Today's Diagnoses     Morbid obesity with BMI of 40.0-44.9, adult (H)    -  1    Screening for iron deficiency anemia        Screening for endocrine, nutritional, metabolic and immunity disorder          Care Instructions    PLAN:    1.  Start working on task list items  2.  Schedule with diet for next month          Follow-ups after your visit        Additional Services     NUTRITION REFERRAL       Type of nutrition instruction needed: Weight Loss  Your provider has referred you to:     [facility]                  Follow-up notes from your care team     Return in 1 month (on 7/28/2017).      Future tests that were ordered for you today     Open Future Orders        Priority Expected Expires Ordered    CBC with platelets Routine 6/28/2017 12/25/2017 6/28/2017    Comprehensive metabolic panel Routine 6/28/2017 12/25/2017 6/28/2017    Hemoglobin A1c Routine 6/28/2017 12/25/2017 6/28/2017    Vitamin D Deficiency Routine 6/28/2017 12/25/2017 6/28/2017            Who to contact     If you have questions or need follow up information about today's clinic visit or your schedule please contact [facility] directly at [phone].  Normal or non-critical lab and imaging results will be communicated to you by MyChart, letter or phone within 4 business days after the clinic has received the results. If you do not hear from us within 7 days, please contact the clinic through [employer] or phone. If you have a critical or abnormal lab result, we will notify you by phone as soon as possible.  Submit refill requests through Issue  "or call your pharmacy and they will forward the refill request to us. Please allow 3 business days for your refill to be completed.          Additional Information About Your Visit        MyChart Information     [employer] gives you secure access to your electronic health record. If you see a primary care provider, you can also send messages to your care team and make appointments. If you have questions, please call your primary care clinic.  If you do not have a primary care provider, please call [phone] and they will assist you.        Care EveryWhere ID     This is your Care EveryWhere ID. This could be used by other organizations to access your [place] medical records  [id number]        Your Vitals Were     Pulse Temperature Respirations Height Pulse Oximetry BMI (Body Mass Index)    74 98.1  F (36.7  C) 18 5' 5\" (1.651 m) 96% 41.98 kg/m2       Blood Pressure from Last 3 Encounters:   06/28/17 126/82   05/08/17 118/82   01/31/17 137/77    Weight from Last 3 Encounters:   06/28/17 252 lb 4.8 oz (114.4 kg)   05/08/17 271 lb 6.4 oz (123.1 kg)   01/31/17 270 lb (122.5 kg)              We Performed the Following     NUTRITION REFERRAL     OP ROOMING NOTE TO FROILAN        Primary Care Provider Office Phone # Fax #    Jody CLAIRE Champagne [facility] [phone]       [facility] [address]  [facility] [address]        Equal Access to Services     RUBEN CYR : Hadii aad ku hadasho Soomaali, waaxda luqadaha, qaybta kaalmada adeegyada, waxay regina cárdenas . So [facility] [phone].    ATENCIÓN: Si habla español, tiene a tyler disposición servicios gratuitos de asistencia lingüística. Llame al [phone].    We comply with applicable federal civil rights laws and [place] laws. We do not discriminate on the basis of race, color, national origin, age, disability sex, sexual orientation or gender identity.            Thank you!     Thank you for choosing " [facility]  for your care. Our goal is always to provide you with excellent care. Hearing back from our patients is one way we can continue to improve our services. Please take a few minutes to complete the written survey that you may receive in the mail after your visit with us. Thank you!             Your Updated Medication List - Protect others around you: Learn how to safely use, store and throw away your medicines at www.disposemymeds.org.          This list is accurate as of: 6/28/17  2:27 PM.  Always use your most recent med list.                   Brand Name Dispense Instructions for use Diagnosis    cyclobenzaprine 10 MG tablet    FLEXERIL    30 tablet    Take 1 tablet (10 mg) by mouth daily as needed for muscle spasms    Pain in joint involving ankle and foot, unspecified laterality       etonogestrel-ethinyl estradiol 0.12-0.015 MG/24HR vaginal ring    NUVARING    1 each    Place 1 ring every 21 days then remove for 1 week. Needs appointment for further refills    Encounter for surveillance of contraceptives       meloxicam 15 MG tablet    MOBIC     Take 15 mg by mouth daily        MOTRIN IB PO      Take 600 mg by mouth        naproxen 500 MG tablet    NAPROSYN    60 tablet    Take 1 tablet (500 mg) by mouth 2 times daily (with meals) As needed    LBP (low back pain)       phentermine 37.5 MG tablet    ADIPEX-P    30 tablet    Take 1 tablet (37.5 mg) by mouth every morning (before breakfast)    Morbid obesity due to excess calories (H)       VENTOLIN  (90 BASE) MCG/ACT Inhaler   Generic drug:  albuterol           zolpidem 5 MG tablet    AMBIEN    1 tablet    Take tablet by mouth 15 minutes prior to sleep, for Sleep Study    RODRIGO (obstructive sleep apnea)

## 2017-06-28 NOTE — MR AVS SNAPSHOT
MRN:[id number]                      After Visit Summary   6/28/2017    Geneva Cooley    MRN: [id number]           Visit Information        Provider Department      6/28/2017 2:00 PM 1, Sh Gerry Diet, RD [facility]      Your next 10 appointments already scheduled     Jul 25, 2017  1:30 PM CDT   Weight Loss Visit with Vero Garrett Diet 3, RD   [facility] ([facility])    [address] [place] [phone]            Jul 25, 2017  2:00 PM CDT   Weight Loss Visit with Deloris Rome PA-C   [facility] ([facility])    [address] [place] [phone]              MyChart Information     [employer] gives you secure access to your electronic health record. If you see a primary care provider, you can also send messages to your care team and make appointments. If you have questions, please call your primary care clinic.  If you do not have a primary care provider, please call [phone] and they will assist you.        Care EveryWhere ID     This is your Care EveryWhere ID. This could be used by other organizations to access your [place] medical records  [id number]        Equal Access to Services     RUBEN CYR : Hadii aad ku hadasho Soangelicaali, waaxda luqadaha, qaybta kaalmada jose elias, oscar mata. So [facility] [phone].    ATENCIÓN: Si habla español, tiene a tyler disposición servicios gratuitos de asistencia lingüística. Llame al [phone].    We comply with applicable federal civil rights laws and [place] laws. We do not discriminate on the basis of race, color, national origin, age, disability sex, sexual orientation or gender identity.

## 2017-06-28 NOTE — PROGRESS NOTES
[address]  [place] [address]  Phone [phone] Fax [phone]    Date: 2017    RE: ROB TOBIAS  MR#: [id number]  : 1985    Dear Jody Coughlin CNP,  I had the pleasure of seeing your patient, ROB TOBIAS, to evaluate her obesity and consider her for possible weight loss surgery. As you know, ROB is 31 years old. She has been overweight since early childhood. She has been morbidly obese for the last several years and has been unable to achieve long-term success with weight loss attempts, such as: Phentermine.   Comorbidities of obesity from her past medical history include: Low back pain, Hirsutism, Cellulitis.  The symptoms related to her obesity include Irregular menstrual cycles, heartburn, Reflux, Ankle or foot pain, Hip pain, Knee pain, Swelling of legs, Back pain, Excessively sleep during the day, Loud snoring, Shortness of Breath with activity, Leg swelling. The following ADLs are hindered due to her weight: Not enough energy to complete routine daily tasks, Shortness of breath with little activity, Lower body dressing, Bathing, Toileting.    Non-Obesity Related Past Medical History:  Anxiety - does not affect ADLs  Depression - Experienced this after the recent death of her mother Oct 2016. Denies any current depression. NO history of SI or suicidal attempts.  Acne  Binge Eating - describes as 1 time daily. More due to boredom. Has done much less lately due to being so busy.   Past Surgical History:  NO surgical history or personal history of DVT. Mother had a brain aneurysm. Thinks she might have had a DVT but not sure. Doesn't ever remember it being called that. Her siblings have NO history of DVT.     Family History:  Father- High blood pressure;High cholesterol  Mother- Cancer;Diabetes;High blood pressure;High cholesterol;Obese: Cancer type: breast  Sibling 1- High blood pressure;High cholesterol;Obese  Sibling 2- High blood pressure  Social History:  She is Single.  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  Ethnicity: white/  ETOH: Less than Monthly. Will drink 3-4 times per year.  Illicit Drug Use: None  Tobacco Use: No quit 2001  Support system: friends, brother will be available to help the patient in the early post op period. friends and brother is who the patient can count on for support throughout her weight loss journey.  Can you afford three meals per day? Yes  ?Can you afford the $50-60 per month for vitamins? Yes    A 14 point review of system shows:  Female: Irregular menstrual cycles,  Gastrointestinal: Reflux / heartburn, - occasional tums  Musculoskeletal: Ankle or foot pain, Back pain, Knee pain,  Psychological: Anxiety,  Pulmonary: Shortness of Breath with activity, Snoring, Excessively sleep during the day,  Skin: Leg swelling    Vital Signs: Ht: 65 in, Wt: 252 lbs., BMI: 41.9, BP:126 /82 , Pulse: 74, RR: 18, Temp: 98.1 O2 Sat: 96%    PHYSICAL EXAMINATION:  GENERAL: Alert and oriented x3. NAD  HEENT: Normocephalic, atraumatic, EOMI, Oral dentition adequate for chewing  CARDIOVASCULAR: Regular rate and rhythm, No murmurs, rubs or gallops  RESPIRATORY: Lung sounds clear to auscultation bilaterally  GASTROINTESTINAL: Soft, Obese, Nontender  LOWER EXTREMITIES: No edema bilat  MUSCULOSKELETAL: Examination gait was normal  NEUROLOGIC: Alert and oriented x 3, no gross defect  SKIN: dry, warm to touch    In summary, ROB is morbidly obese with a body mass index of 41.9 kg/m2 and the comorbidities stated above. She attended an informational seminar and is a candidate for Laparoscopic Alin-en-Y Gastric Bypass. She will have to complete the following pre-requisites:    Received weight loss goal of 5 lb prior to surgery.  A total of 6 structured dietitian weight loss visits are required due to your insurance. Please schedule these with our dietitians.  Achieve clearance from dietitian to see surgeon.  Get Sleep study. - was previously ordered.  Has to make appointment to complete  Have preoperative laboratory tests drawn.  Psychological Evaluation with MMPI and clearance for weight loss surgery.     Discussed patients medical history and completed task list. Will have patient return next month and discuss the process to surgery and the surgeries themselves. A goal sheet and support group handout were given to the patient.  Total time spent 50 minutes. More than half in counseling.   Thank you for allowing us to participate in her care.    Sincerely,    Deloris Rome MS, PA-C    At [facility] we are committed to providing you exceptional care. Our surgeons specialize in performing the following minimally invasive weight-loss surgeries:  Alin-en-Y gastric bypass  Laparoscopic adjustable gastric band  Sleeve gastrectomy    If you would like to review aspects of our weight loss surgery program, please visit our website at [email or web address]. If you have any questions, please do not hesitate to contact us at [phone].

## 2017-07-03 DIAGNOSIS — Z13.0 SCREENING FOR IRON DEFICIENCY ANEMIA: ICD-10-CM

## 2017-07-03 DIAGNOSIS — Z13.0 SCREENING FOR ENDOCRINE, NUTRITIONAL, METABOLIC AND IMMUNITY DISORDER: ICD-10-CM

## 2017-07-03 DIAGNOSIS — Z13.29 SCREENING FOR ENDOCRINE, NUTRITIONAL, METABOLIC AND IMMUNITY DISORDER: ICD-10-CM

## 2017-07-03 DIAGNOSIS — Z13.21 SCREENING FOR ENDOCRINE, NUTRITIONAL, METABOLIC AND IMMUNITY DISORDER: ICD-10-CM

## 2017-07-03 DIAGNOSIS — Z13.228 SCREENING FOR ENDOCRINE, NUTRITIONAL, METABOLIC AND IMMUNITY DISORDER: ICD-10-CM

## 2017-07-03 LAB
ALBUMIN SERPL-MCNC: 3.2 G/DL (ref 3.4–5)
ALP SERPL-CCNC: 93 U/L (ref 40–150)
ALT SERPL W P-5'-P-CCNC: 24 U/L (ref 0–50)
ANION GAP SERPL CALCULATED.3IONS-SCNC: 7 MMOL/L (ref 3–14)
AST SERPL W P-5'-P-CCNC: 16 U/L (ref 0–45)
BILIRUB SERPL-MCNC: 0.2 MG/DL (ref 0.2–1.3)
BUN SERPL-MCNC: 10 MG/DL (ref 7–30)
CALCIUM SERPL-MCNC: 8.3 MG/DL (ref 8.5–10.1)
CHLORIDE SERPL-SCNC: 107 MMOL/L (ref 94–109)
CO2 SERPL-SCNC: 24 MMOL/L (ref 20–32)
CREAT SERPL-MCNC: 0.63 MG/DL (ref 0.52–1.04)
ERYTHROCYTE [DISTWIDTH] IN BLOOD BY AUTOMATED COUNT: 14.2 % (ref 10–15)
GFR SERPL CREATININE-BSD FRML MDRD: ABNORMAL ML/MIN/1.7M2
GLUCOSE SERPL-MCNC: 170 MG/DL (ref 70–99)
HBA1C MFR BLD: 5.3 % (ref 4.3–6)
HCT VFR BLD AUTO: 40.6 % (ref 35–47)
HGB BLD-MCNC: 13.5 G/DL (ref 11.7–15.7)
MCH RBC QN AUTO: 27.8 PG (ref 26.5–33)
MCHC RBC AUTO-ENTMCNC: 33.3 G/DL (ref 31.5–36.5)
MCV RBC AUTO: 84 FL (ref 78–100)
PLATELET # BLD AUTO: 333 10E9/L (ref 150–450)
POTASSIUM SERPL-SCNC: 3.9 MMOL/L (ref 3.4–5.3)
PROT SERPL-MCNC: 7.4 G/DL (ref 6.8–8.8)
RBC # BLD AUTO: 4.85 10E12/L (ref 3.8–5.2)
SODIUM SERPL-SCNC: 138 MMOL/L (ref 133–144)
WBC # BLD AUTO: 9.4 10E9/L (ref 4–11)

## 2017-07-03 PROCEDURE — 82306 VITAMIN D 25 HYDROXY: CPT | Performed by: PHYSICIAN ASSISTANT

## 2017-07-03 PROCEDURE — 80053 COMPREHEN METABOLIC PANEL: CPT | Performed by: PHYSICIAN ASSISTANT

## 2017-07-03 PROCEDURE — 36415 COLL VENOUS BLD VENIPUNCTURE: CPT | Performed by: PHYSICIAN ASSISTANT

## 2017-07-03 PROCEDURE — 83036 HEMOGLOBIN GLYCOSYLATED A1C: CPT | Performed by: PHYSICIAN ASSISTANT

## 2017-07-03 PROCEDURE — 85027 COMPLETE CBC AUTOMATED: CPT | Performed by: PHYSICIAN ASSISTANT

## 2017-07-05 LAB — DEPRECATED CALCIDIOL+CALCIFEROL SERPL-MC: 33 UG/L (ref 20–75)

## 2017-07-25 ENCOUNTER — TELEPHONE (OUTPATIENT)
Dept: FAMILY MEDICINE | Facility: OTHER | Age: 32
End: 2017-07-25

## 2017-07-25 NOTE — TELEPHONE ENCOUNTER
Reason for call:  Symptom  Reason for call:  Patient reporting a symptom    Symptom or request: pain in ribcage     Duration (how long have symptoms been present): since monday    Have you been treated for this before? No    Additional comments: Pt wasn't feeling all that well on Saturday and she ended up vomiting and having diarrhea. Going into Sunday she wasn't feeling any better and the symptoms slowed down later in the day but then she started having some pain in her stomach which didn't last long. Now she just has pain on both sides of her ribcage. Its not a constant pain only when she moves and states that it feels bruised to the touch. Please advise.     Phone Number patient can be reached at:  Home number on file [phone] (home)    Best Time:  anytime    Can we leave a detailed message on this number:  YES    Call taken on 7/25/2017 at 1:02 PM by Jazmin Newby

## 2017-07-25 NOTE — TELEPHONE ENCOUNTER
"Geneva Cooley is a 31 year old female who calls with pain across her chest.    NURSING ASSESSMENT:  Description: Patient states she has a \"bruised feeling\" under both of her breasts near the under wire line.   Onset/duration:  Yesterday  Precip. factors:  Had vomiting and diarrhea Saturday night  Associated symptoms:  None  Improves/worsens symptoms: Worsened with twisting, rising, laughing, coughing, and touch. Has tried tylenol  Allergies:   Allergies   Allergen Reactions     Vicodin [Hydrocodone-Acetaminophen] Hives       RECOMMENDED DISPOSITION:  Home care advice - try heat and ibuprofen. Offered appointment, but patient declines at this time.  Will comply with recommendation: YES   If further questions/concerns or if Sx do not improve, worsen or new Sx develop, call your PCP or [facility] as soon as possible.    NOTES:  Disposition was determined by the first positive assessment question, therefore all previous assessment questions were negative.     Guideline used:  Telephone Triage Protocols for Nurses, Fifth Edition, Kristin Hernandez, RN, BSN      "

## 2017-07-26 ENCOUNTER — HOSPITAL ENCOUNTER (OUTPATIENT)
Dept: RESPIRATORY THERAPY | Facility: CLINIC | Age: 32
End: 2017-07-26
Attending: PHYSICIAN ASSISTANT
Payer: COMMERCIAL

## 2017-07-26 NOTE — PROGRESS NOTES
Abg and efraín's test completed to left wrist with only .25ml of blood obtained.  Lab said it was not enough to run sample.  Patient refuses further attempts.

## 2017-07-28 ENCOUNTER — OFFICE VISIT (OUTPATIENT)
Dept: SURGERY | Facility: CLINIC | Age: 32
End: 2017-07-28
Payer: COMMERCIAL

## 2017-07-28 ENCOUNTER — THERAPY VISIT (OUTPATIENT)
Dept: SLEEP MEDICINE | Facility: CLINIC | Age: 32
End: 2017-07-28

## 2017-07-28 DIAGNOSIS — E66.01 MORBID OBESITY WITH BMI OF 40.0-44.9, ADULT (H): ICD-10-CM

## 2017-07-28 PROCEDURE — 97803 MED NUTRITION INDIV SUBSEQ: CPT | Performed by: DIETITIAN, REGISTERED

## 2017-07-28 NOTE — MR AVS SNAPSHOT
MRN:[id number]                      After Visit Summary   7/28/2017    Geneva Cooley    MRN: [id number]           Visit Information        Provider Department      7/28/2017 11:00 AM 2, Sh Gerry Prado, RD [facility] [place] OTHER      Your next 10 appointments already scheduled     Jul 28, 2017  8:00 PM CDT   Psg Split W/Tcm with PH BED 1   [facility] ([facility])    [address] [place] [id number]   [phone]            Aug 09, 2017 10:00 AM CDT   New Visit with Poonam Rodriguez, PhD   [facility] ([facility])    [address] [place] [phone]   [phone]            Aug 10, 2017  9:00 AM CDT   Return Sleep Patient with Nacho Simmons PA-C   [facility] ([facility])    [address] [place] [phone]   [phone]            Aug 16, 2017  2:00 PM CDT   Return Visit with Poonam Rodriguez, PhD   [facility] ([facility])    [address] [place] [phone]            Aug 25, 2017 10:00 AM CDT   Weight Loss Visit with Sh Wl Diet 1, ISAK   [facility] ([facility])    [address] [place] [phone]   [phone]            Sep 13, 2017  2:00 PM CDT   Return Visit with Poonam Rodriguez, PhD   [facility] ([facility])    [id number] [place] [phone]              MyChart Information     [employer] gives you secure access to your electronic health record. If you see a primary care provider, you can also send messages to your care team and make appointments. If you have questions, please call your primary care clinic.  If you do not have a primary care  provider, please call [phone] and they will assist you.        Care EveryWhere ID     This is your Care EveryWhere ID. This could be used by other organizations to access your [place] medical records  [id number]        Equal Access to Services     RUBEN CYR : Adam Couch, ghada santiago, boubacar kaalcassandra moctezuma, oscar mata. So [facility] [phone].    ATENCIÓN: Si habla español, tiene a tyler disposición servicios gratuitos de asistencia lingüística. Llame al [phone].    We comply with applicable federal civil rights laws and [place] laws. We do not discriminate on the basis of race, color, national origin, age, disability sex, sexual orientation or gender identity.

## 2017-07-28 NOTE — PROGRESS NOTES
"PRE-SURGICAL WEIGHT LOSS NUTRITION APPOINTMENT  DATE OF VISIT: 2017  Name: ROB TOBIAS  : 1985  Gender: Female  MRN: [id number]  Age: 31  ASSESSMENT  REASON FOR VISIT:  ROB TOBIAS is a 31 year old Female presents today for a pre-surgical weight loss follow-up appointment.  DIAGNOSIS:  Class III  Morbid Obesity    ANTHROPOMETRICS:  Height: 65 inches  Initial Weight: 252.0 lbs  Weight last visit: 252 lbs  Current Weight: 251.0 lbs  BMI: 41.8 kg/m2    NUTRITION HISTORY:  Breakfast: Cereal with skim milk, scrambled egg in tortilla or Zach bar-within 1 hour of waking  Lunch: subway or pizza or cereal with milk or turkey sandwich-12:00   Supper: spaghetti or tacos or chicken or sandwich-5:00-6:00   Snacks: pretty much whatever is lying around-yogurt with granola or chips  Drinks: water, 8 oz. pop, juice on rare occasion  Consuming liquid calories: Soda pop  Eating 3 meals per day: Yes  Eating slower: Yes  Chewing foods thoroughly: Yes  Take 30 minutes to consume each meal: Yes  Fluids and meals separate by at least 30 minutes: No  Comments: patient is most interested in weight loss surgery to \"keep up\" with her 5 year old daughter; in discussing binge eating pt admits she does over eat if she goes too many hours; pt has 2 friends that have had weight loss surgery (one successful the other not successful)  17 Patient feels she is making progress towards goals. Wants to eliminate pop during the next month.  Desired Surgical Procedure: gastric bypass  PHYSICAL ACTIVITY:  None-patient feels she exercises while working. Explained difference between daily movement and intentional exercise. Patient has gym membership  DIAGNOSIS:  Previous Nutrition Diagnosis: Obesity related to excess energy intake as evidenced by BMI of 41.9 kg/m2-no change  Previous goals:  Start: 1 multivitamin mineral, 2000 IU Vitamin D, 1200 mg calcium with vitamin D (2-3 separate doses)-met  Replace pop with more water or enhanced " water-not met  Practice avoiding fluids with meals-improving  Current Nutrition Diagnosis: Obesity related to excess energy intake as evidenced by BMI of 41.8 kg/m2  IMPLEMENTATION:  Nutrition Prescription: Recommended energy/nutrient modification  Goals:  Eliminate carbonation  Avoid liquids 30 minutes before, during and 30 minutes after  Implementation:  - Discussed progress towards previous goals  - Reinforced importance of making behavior changes in preparation for bariatric surgery.  NUTRITION MONITORING AND EVALUATION:  Anticipated compliance: Fair to good  Patient verbalized good understanding of bariatric diet guidelines.  Follow up: 1 month  # of visits needed: 4  Cleared by RD: No  TIME SPENT WITH PATIENT: 20 minutes (patient arrived 10 minutes late)  Isaak Hernandez, RD, LD  [facility]  [phone] (office phone)

## 2017-07-28 NOTE — MR AVS SNAPSHOT
After Visit Summary   7/28/2017    Geneva Cooley    MRN: [id number]           Patient Information     Date Of Birth          1985        Visit Information        Provider Department      7/28/2017 8:00 PM PH BED 1 [facility] [place] [facility]         Follow-ups after your visit        Your next 10 appointments already scheduled     Aug 09, 2017 10:00 AM CDT   New Visit with Poonam Rodriguez, PhD   [facility] ([facility])    [id number] [place] [phone]   [phone]            Aug 10, 2017  9:00 AM CDT   Return Sleep Patient with Nacho Simmons PA-C   [facility] ([facility])    [address] [place] [phone]            Aug 16, 2017  2:00 PM CDT   Return Visit with Poonam Rodriguez, PhD   [facility] ([facility])    [id number] [place] [id number] [phone]            Aug 25, 2017 10:00 AM CDT   Weight Loss Visit with Vero Garrett Diet 1, RD   [facility] ([facility])    [address] [place] [phone]            Sep 13, 2017  2:00 PM CDT   Return Visit with Poonam Rodriguez, PhD   [facility] ([facility])    [address] [place] [phone]              Who to contact     If you have questions or need follow up information about today's clinic visit or your schedule please contact [facility] [place] [facility] directly at [phone].  Normal or non-critical lab and imaging results will be communicated to you by MyChart, letter or phone within 4 business days after the clinic has received the results. If you do not hear from us within 7 days, please contact the clinic through MyChart or phone.  If you have a critical or abnormal lab result, we will notify you by phone as soon as possible.  Submit refill requests through [employer] or call your pharmacy and they will forward the refill request to us. Please allow 3 business days for your refill to be completed.          Additional Information About Your Visit        [employer] Information     [employer] gives you secure access to your electronic health record. If you see a primary care provider, you can also send messages to your care team and make appointments. If you have questions, please call your primary care clinic.  If you do not have a primary care provider, please call [phone] and they will assist you.        Care EveryWhere ID     This is your Care EveryWhere ID. This could be used by other organizations to access your [place] medical records  [id number]         Blood Pressure from Last 3 Encounters:   06/28/17 126/82   05/08/17 118/82   01/31/17 137/77    Weight from Last 3 Encounters:   06/28/17 114.4 kg (252 lb 4.8 oz)   05/08/17 123.1 kg (271 lb 6.4 oz)   01/31/17 122.5 kg (270 lb)              Today, you had the following     No orders found for display       Primary Care Provider Office Phone # Fax #    Jody CLAIRE Champagne [facility] [phone]       [address]  [facility] [address]        Equal Access to Services     RUBEN CYR : Hadii aad ku hadasho Soomaali, waaxda luqadaha, qaybta kaalmada adeegyada, oscar tapia haynancy cárdenas . So [facility] [phone].    ATENCIÓN: Si habla español, tiene a tyler disposición servicios gratuitos de asistencia lingüística. Ana Luisa al [phone].    We comply with applicable federal civil rights laws and [place] laws. We do not discriminate on the basis of race, color, national origin, age, disability sex, sexual orientation or gender identity.            Thank you!     Thank you for choosing [facility]  for your  care. Our goal is always to provide you with excellent care. Hearing back from our patients is one way we can continue to improve our services. Please take a few minutes to complete the written survey that you may receive in the mail after your visit with us. Thank you!             Your Updated Medication List - Protect others around you: Learn how to safely use, store and throw away your medicines at www.disposemymeds.org.          This list is accurate as of: 7/28/17 11:59 PM.  Always use your most recent med list.                   Brand Name Dispense Instructions for use Diagnosis    cyclobenzaprine 10 MG tablet    FLEXERIL    30 tablet    Take 1 tablet (10 mg) by mouth daily as needed for muscle spasms    Pain in joint involving ankle and foot, unspecified laterality       etonogestrel-ethinyl estradiol 0.12-0.015 MG/24HR vaginal ring    NUVARING    1 each    Place 1 ring every 21 days then remove for 1 week. Needs appointment for further refills    Encounter for surveillance of contraceptives       meloxicam 15 MG tablet    MOBIC     Take 15 mg by mouth daily        MOTRIN IB PO      Take 600 mg by mouth        naproxen 500 MG tablet    NAPROSYN    60 tablet    Take 1 tablet (500 mg) by mouth 2 times daily (with meals) As needed    LBP (low back pain)       phentermine 37.5 MG tablet    ADIPEX-P    30 tablet    Take 1 tablet (37.5 mg) by mouth every morning (before breakfast)    Morbid obesity due to excess calories (H)       VENTOLIN  (90 BASE) MCG/ACT Inhaler   Generic drug:  albuterol           zolpidem 5 MG tablet    AMBIEN    1 tablet    Take tablet by mouth 15 minutes prior to sleep, for Sleep Study    RODRIGO (obstructive sleep apnea)

## 2017-08-09 ENCOUNTER — OFFICE VISIT (OUTPATIENT)
Dept: PSYCHOLOGY | Facility: CLINIC | Age: 32
End: 2017-08-09
Payer: COMMERCIAL

## 2017-08-09 DIAGNOSIS — F41.9 ANXIETY DISORDER, UNSPECIFIED: Primary | ICD-10-CM

## 2017-08-09 PROCEDURE — 90834 PSYTX W PT 45 MINUTES: CPT | Performed by: PSYCHOLOGIST

## 2017-08-09 ASSESSMENT — ANXIETY QUESTIONNAIRES
2. NOT BEING ABLE TO STOP OR CONTROL WORRYING: NEARLY EVERY DAY
7. FEELING AFRAID AS IF SOMETHING AWFUL MIGHT HAPPEN: MORE THAN HALF THE DAYS
GAD7 TOTAL SCORE: 18
3. WORRYING TOO MUCH ABOUT DIFFERENT THINGS: NEARLY EVERY DAY
6. BECOMING EASILY ANNOYED OR IRRITABLE: NEARLY EVERY DAY
1. FEELING NERVOUS, ANXIOUS, OR ON EDGE: MORE THAN HALF THE DAYS
5. BEING SO RESTLESS THAT IT IS HARD TO SIT STILL: NEARLY EVERY DAY
IF YOU CHECKED OFF ANY PROBLEMS ON THIS QUESTIONNAIRE, HOW DIFFICULT HAVE THESE PROBLEMS MADE IT FOR YOU TO DO YOUR WORK, TAKE CARE OF THINGS AT HOME, OR GET ALONG WITH OTHER PEOPLE: SOMEWHAT DIFFICULT

## 2017-08-09 ASSESSMENT — PATIENT HEALTH QUESTIONNAIRE - PHQ9
SUM OF ALL RESPONSES TO PHQ QUESTIONS 1-9: 7
5. POOR APPETITE OR OVEREATING: MORE THAN HALF THE DAYS

## 2017-08-09 NOTE — MR AVS SNAPSHOT
MRN:[id number]                      After Visit Summary   8/9/2017    Geneva Cooley    MRN: [id number]           Visit Information        Provider Department      8/9/2017 10:00 AM Poonam Rodriguez, PhD [facility] BARIATRICS      Your next 10 appointments already scheduled     Aug 10, 2017  9:00 AM CDT   Return Sleep Patient with Nacho Simmons PA-C   [facility] ([facility])    [address] [place] [phone]            Aug 16, 2017  2:00 PM CDT   Return Visit with Poonam Rodriguez, PhD   [facility] ([facility])    [address] [place] [phone]            Aug 25, 2017 10:00 AM CDT   Weight Loss Visit with Vero Garrett Diet 1, RD   [facility] ([facility])    [address] [place] [phone]            Sep 13, 2017  2:00 PM CDT   Return Visit with Poonam Rodriguez, PhD   [facility] ([facility])    [address] [place] [address] [phone]              MyChart Information     [employer] gives you secure access to your electronic health record. If you see a primary care provider, you can also send messages to your care team and make appointments. If you have questions, please call your primary care clinic.  If you do not have a primary care provider, please call [phone] and they will assist you.        Care EveryWhere ID     This is your Care EveryWhere ID. This could be used by other organizations to access your [facility] medical records  [id number]        Equal Access to Services     RUBEN MATA: Adam Couch, waaxda luqadaha, qaybta kaalmagene moctezuma, oscar mata. So  [facility] [phone].    ATENCIÓN: Si habla español, tiene a tlyer disposición servicios gratuitos de asistencia lingüística. Llame al [phone].    We comply with applicable federal civil rights laws and [place] laws. We do not discriminate on the basis of race, color, national origin, age, disability sex, sexual orientation or gender identity.

## 2017-08-09 NOTE — PROGRESS NOTES
Progress Note - Initial Session    Client Name:  Geneva Cooley Date: 8/9/2017         Service Type: Individual/Bariatric Evaluation Intake      Session Start Time: 10:00  Session End Time: 10:52      Session Length: 52 minutes     Session #: 1     Attendees: Client attended alone      Diagnostic Assessment in progress as part of bariatric evaluation. Gathered history and processed issues regarding weight, eating habits, weight management, and medical history related to weight. Understanding of surgical risks and guidelines for post-surgical follow up also reviewed. Unable to complete documentation at the conclusion of the first session. Thus, additional time will be needed to review general psychological, social, and medical history.    Mental Status Assessment:  Appearance:   Appropriate   Eye Contact:   Good   Psychomotor Behavior: Normal   Attitude:   Cooperative   Orientation:   All  Speech   Rate / Production: Normal    Volume:  Normal   Mood:    Normal  Affect:    Appropriate ; tearful at times while discussing death of her mother  Thought Content:  Clear   Thought Form:  Coherent  Logical   Insight:    Good       Safety Issues and Plan for Safety and Risk Management:  Client denies current fears or concerns for personal safety.  Client denies current or recent suicidal ideation or behaviors.  Client denies current or recent homicidal ideation or behaviors.  Client denies current or recent self injurious behavior or ideation.  Client denies other safety concerns.  A safety and risk management plan has not been developed at this time, however client was given the after-hours number / 911 should there be a change in any of these risk factors.  Client reports there are no firearms in the house.      Diagnostic Criteria:  Mixed anxiety-depressive disorder: clinically significant symptoms of anxiety and depression, but the criteria are not met for either a specific Mood Disorder or a specific  Anxiety Disorder. (Continue to assess)        DSM5 Diagnoses: (Sustained by DSM5 Criteria Listed Above)  Diagnoses: 300.00 (F41.9) Unspecified Anxiety Disorder  Psychosocial & Contextual Factors: Client reported she has a history of anxiety and depression and was previously treated with Zoloft. She explained that her mother passed in November 2016 which has been upsetting. Client reported gaining weight over the past year while taking care of her mother and spending time with her as her health declined (they often resorted to convenience foods). Client works as a , attends school, and cares for her 7 year old daughter which has made it difficult to find time/energy to focus on caring for herself.  WHODAS 2.0 (12 item)            This questionnaire asks about difficulties due to health conditions. Health conditions  include  disease or illnesses, other health problems that may be short or long lasting,  injuries, mental health or emotional problems, and problems with alcohol or drugs.                     Think back over the past 30 days and answer these questions, thinking about how much  difficulty you had doing the following activities. For each question, please [place] only  one response.    S1 Standing for long periods such as 30 minutes? Moderate =   3   S2 Taking care of household responsibilities? Mild =           2   S3 Learning a new task, for example, learning how to get to a new place? None =         1   S4 How much of a problem do you have joining community activities (for example, festivals, Jew or other activities) in the same way as anyone else can? Mild =           2   S5 How much have you been emotionally affected by your health problems? Moderate =   3     In the past 30 days, how much difficulty did you have in:   S6 Concentrating on doing something for ten minutes? None =         1   S7 Walking a long distance such as a kilometer (or equivalent)? Moderate =   3   S8 Washing your  whole body? Moderate =   3   S9 Getting dressed? Moderate =   3   S10 Dealing with people you do not know? None =         1   S11 Maintaining a friendship? Mild =           2   S12 Your day to day work? Moderate =   3     H1 Overall, in the past 30 days, how many days were these difficulties present? Record number of days 20   H2 In the past 30 days, for how many days were you totally unable to carry out your usual activities or work because of any health condition? Record number of days  2   H3 In the past 30 days, not counting the days that you were totally unable, for how many days did you cut back or reduce your usual activities or work because of any health condition? Record number of days 10       Collateral Reports Completed:  Not Applicable      PLAN: (Homework, other):  Client RTC next week to complete DA.      Poonam Rodriguez, PhD, LP

## 2017-08-10 ASSESSMENT — ANXIETY QUESTIONNAIRES: GAD7 TOTAL SCORE: 18

## 2017-08-14 ENCOUNTER — TRANSFERRED RECORDS (OUTPATIENT)
Dept: HEALTH INFORMATION MANAGEMENT | Facility: CLINIC | Age: 32
End: 2017-08-14

## 2017-08-14 ENCOUNTER — DOCUMENTATION ONLY (OUTPATIENT)
Dept: PSYCHOLOGY | Facility: CLINIC | Age: 32
End: 2017-08-14
Payer: COMMERCIAL

## 2017-08-14 DIAGNOSIS — F41.9 ANXIETY DISORDER, UNSPECIFIED: Primary | ICD-10-CM

## 2017-08-14 PROCEDURE — 96101 HC PSYCHOLOGICAL TEST BY PSYCHOLOGIST/MD, PER HR: CPT | Performed by: PSYCHOLOGIST

## 2017-08-14 NOTE — PROGRESS NOTES
Client Name: Geneva Cooley   MRN: [id number]  : 1985    Client completed the [place] Multiphasic Personality Inventory-2 (MMPI-2), a self-report personality inventory, as part of her evaluation. Validity scales indicate that the client responded in an open and consistent manner, resulting in a valid profile. The following results are likely to be an accurate reflection of client's current functioning. Client s responses suggest that she is reporting low levels of general emotional distress. Individuals with similar profiles tend to generally see themselves as well adjusted and as sufficiently able to cope with the difficulties they face that they feel little or no need to draw attention to them. They may be uncomfortable with more than minimal levels of stimulation and emotionality and prefer to operate in circumstances that are conventional, predictable, familiar and overlearned. They may present with constricted expression of emotion.

## 2017-08-16 ENCOUNTER — OFFICE VISIT (OUTPATIENT)
Dept: PSYCHOLOGY | Facility: CLINIC | Age: 32
End: 2017-08-16
Payer: COMMERCIAL

## 2017-08-16 DIAGNOSIS — F41.1 GAD (GENERALIZED ANXIETY DISORDER): Primary | ICD-10-CM

## 2017-08-16 PROCEDURE — 90791 PSYCH DIAGNOSTIC EVALUATION: CPT | Performed by: PSYCHOLOGIST
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
## 2017-08-16 NOTE — Clinical Note
Nico Vera,  Here is the DA for Geneva's pre-bariatric surgical psychological evaluation. She currently meets criteria for Generalized Anxiety Disorder; she experiences a lot of daily stress with regard to school, work, finances, and caring for her daughter. She seems to manage stress adequately without the use of medications but this may be contributing to her ability to make healthy choices and allot time for meal planning and exercise. We will follow up in one month to see how initial behavioral changes go. Please let me know if you have any questions or concerns.  Thank you! Poonam Rodriguez

## 2017-08-16 NOTE — MR AVS SNAPSHOT
MRN:[id number]                      After Visit Summary   8/16/2017    Geneva Cooley    MRN: [id number]           Visit Information        Provider Department      8/16/2017 2:00 PM Poonam Rodriguez, PhD [facility] BARIATRICS      Your next 10 appointments already scheduled     Aug 25, 2017 10:00 AM CDT   Weight Loss Visit with  Gerry Diet 1, RD   [facility] ([facility])    [address] [id number] [phone]            Sep 13, 2017  2:00 PM CDT   Return Visit with Poonam Rodriguez, PhD   [facility] ([facility])    [address] [place] [id number] [phone]              MyChart Information     [employer] gives you secure access to your electronic health record. If you see a primary care provider, you can also send messages to your care team and make appointments. If you have questions, please call your primary care clinic.  If you do not have a primary care provider, please call [phone] and they will assist you.        Care EveryWhere ID     This is your Care EveryWhere ID. This could be used by other organizations to access your [facility] medical records  [id number]        Equal Access to Services     RUBEN CYR : Hadii barbi calverto Soaravind, waaxda luqadaha, qaybta kaalmada adeolayinkayagene, oscar mata. So [facility] [phone].    ATENCIÓN: Si habla español, tiene a tyler disposición servicios gratuitos de asistencia lingüística. Llame al [phone].    We comply with applicable federal civil rights laws and [place] laws. We do not discriminate on the basis of race, color, national origin, age, disability sex, sexual orientation or gender identity.

## 2017-08-24 NOTE — PROGRESS NOTES
SUBJECTIVE:                                                    Geneva Cooley is a 31 year old female who presents to clinic today for the following health issues:    History of Present Illness   Depression & Anxiety Follow-up:     Depression/Anxiety:  Anxiety only    Status since last visit::  Worsened    Other associated symptoms of anxiety::  YES    Significant life event::  YES    Current substance use::  None    Depression symptoms::  None    Answers for HPI/ROS submitted by the patient on 8/28/2017   If you checked off any problems, how difficult have these problems made it for you to do your work, take care of things at home, or get along with other people?: Very difficult  PHQ9 TOTAL SCORE: 9  LILIA 7 TOTAL SCORE: 14    Recheck BP - 110/78    Daughter has been acting out, not listening, throwing things, destroying property.  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  When the 11 and 13 year olds are present, daughter's behaviors escalate.  Daughter now going to counseling which is not helping much yet.  Mom states counselor says that patient's reaction to daughter's behavior is causing persistence of behaviors.  Patient is trying to be consistent with consequences but sometimes gives up when she can't take it anymore.    Daughter goes to Dad and Deandra (s/o) house - every other Friday through Sunday, they try to keep rules and consequences the same.     Has been on Zoloft and Lexapro in the past. Tolerated well as far as she can remember.    Is not having panic attacks.    Is starting school this fall.  Is concerned about concentration, memory, retention, organization concerns.  Has not been diagnosed with ADHD in the past.     Problem list and histories reviewed & adjusted, as indicated.  Additional history: as documented    Labs reviewed in EPIC    ROS:  Constitutional, HEENT, cardiovascular, pulmonary, gi and gu systems are negative, except as  "otherwise noted.      OBJECTIVE:   /78  Pulse 97  Temp 97.7  F (36.5  C) (Oral)  Resp 16  Ht 5' 5\" (1.651 m)  Wt 252 lb (114.3 kg)  SpO2 99%  BMI 41.93 kg/m2  Body mass index is 41.93 kg/(m^2).  GENERAL: healthy, alert and no distress  EYES: Eyes grossly normal to inspection, PERRL and conjunctivae and sclerae normal  HENT: ear canals and TM's normal, nose and mouth without ulcers or lesions  NECK: no adenopathy, no asymmetry, masses, or scars and thyroid normal to palpation  RESP: lungs clear to auscultation - no rales, rhonchi or wheezes  CV: regular rate and rhythm, normal S1 S2, no S3 or S4, no murmur, click or rub, no peripheral edema and peripheral pulses strong  ABDOMEN: soft, nontender, no hepatosplenomegaly, no masses and bowel sounds normal  MS: no gross musculoskeletal defects noted, no edema  SKIN: no suspicious lesions or rashes  NEURO: Normal strength and tone, mentation intact and speech normal  PSYCH: mentation appears normal, affect flat, anxious, judgement and insight intact and appearance well groomed    Diagnostic Test Results:  none     ASSESSMENT/PLAN:     1. Morbid obesity due to excess calories (H)  Patient is working with [facility] to get gastric bypass surgery.  She has 5-10 more pounds to lose.  She would like to continue on Phentermine in addition to working on diet and exercise.  She has lost 19 lbs in the past 3 months which is great.  Phentermine refilled.  - phentermine (ADIPEX-P) 37.5 MG tablet; Take 1 tablet (37.5 mg) by mouth every morning (before breakfast)  Dispense: 30 tablet; Refill: 1    2. LILIA (generalized anxiety disorder)  3. Inattention  Patient having significant anxiety that stems primarily from 7-year old daughter's behavior.  Daughter is now in counseling and she is hoping this will help daughter listen better.  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 She has no friends or family who are helpful who could give her a day of " respite from care of her daughter.  We will start Wellbutrin to take the edge off of her anxiety.  I recommended counseling but patient states she does not have time to go to counseling.  She is concerned about difficulty with concentrating, organizing tasks, procrastinating, etc.  I referred her to [facility] for neuropsych evaluation.  - buPROPion (WELLBUTRIN SR) 150 MG 12 hr tablet; Take 1 tablet once daily and increase to 1 tablet twice daily after 4 to 7 days  Dispense: 60 tablet; Refill: 2  - NEUROPSYCHOLOGY REFERRAL    Greater than 50% of 35 minute visit were spent on counseling or coordination of care regarding morbid obesity, LILIA, inattention.     CLAIRE Lundberg [facility]

## 2017-08-28 ENCOUNTER — OFFICE VISIT (OUTPATIENT)
Dept: FAMILY MEDICINE | Facility: OTHER | Age: 32
End: 2017-08-28
Payer: COMMERCIAL

## 2017-08-28 VITALS
SYSTOLIC BLOOD PRESSURE: 110 MMHG | TEMPERATURE: 97.7 F | RESPIRATION RATE: 16 BRPM | OXYGEN SATURATION: 99 % | HEIGHT: 65 IN | BODY MASS INDEX: 41.99 KG/M2 | HEART RATE: 97 BPM | WEIGHT: 252 LBS | DIASTOLIC BLOOD PRESSURE: 78 MMHG

## 2017-08-28 DIAGNOSIS — R41.840 INATTENTION: ICD-10-CM

## 2017-08-28 DIAGNOSIS — E66.01 MORBID OBESITY DUE TO EXCESS CALORIES (H): Primary | ICD-10-CM

## 2017-08-28 DIAGNOSIS — F41.1 GAD (GENERALIZED ANXIETY DISORDER): ICD-10-CM

## 2017-08-28 PROCEDURE — 99214 OFFICE O/P EST MOD 30 MIN: CPT | Performed by: STUDENT IN AN ORGANIZED HEALTH CARE EDUCATION/TRAINING PROGRAM

## 2017-08-28 RX ORDER — BUPROPION HYDROCHLORIDE 150 MG/1
TABLET, EXTENDED RELEASE ORAL
Qty: 60 TABLET | Refills: 2 | Status: SHIPPED | OUTPATIENT
Start: 2017-08-28 | End: 2017-11-19

## 2017-08-28 RX ORDER — PHENTERMINE HYDROCHLORIDE 37.5 MG/1
37.5 TABLET ORAL
Qty: 30 TABLET | Refills: 1 | Status: SHIPPED | OUTPATIENT
Start: 2017-08-28 | End: 2017-11-17

## 2017-08-28 ASSESSMENT — ANXIETY QUESTIONNAIRES
GAD7 TOTAL SCORE: 14
3. WORRYING TOO MUCH ABOUT DIFFERENT THINGS: MORE THAN HALF THE DAYS
6. BECOMING EASILY ANNOYED OR IRRITABLE: NEARLY EVERY DAY
1. FEELING NERVOUS, ANXIOUS, OR ON EDGE: NEARLY EVERY DAY
7. FEELING AFRAID AS IF SOMETHING AWFUL MIGHT HAPPEN: NOT AT ALL
GAD7 TOTAL SCORE: 14
5. BEING SO RESTLESS THAT IT IS HARD TO SIT STILL: SEVERAL DAYS
2. NOT BEING ABLE TO STOP OR CONTROL WORRYING: MORE THAN HALF THE DAYS
GAD7 TOTAL SCORE: 14
7. FEELING AFRAID AS IF SOMETHING AWFUL MIGHT HAPPEN: NOT AT ALL
4. TROUBLE RELAXING: NEARLY EVERY DAY

## 2017-08-28 ASSESSMENT — PATIENT HEALTH QUESTIONNAIRE - PHQ9
10. IF YOU CHECKED OFF ANY PROBLEMS, HOW DIFFICULT HAVE THESE PROBLEMS MADE IT FOR YOU TO DO YOUR WORK, TAKE CARE OF THINGS AT HOME, OR GET ALONG WITH OTHER PEOPLE: VERY DIFFICULT
SUM OF ALL RESPONSES TO PHQ QUESTIONS 1-9: 9
SUM OF ALL RESPONSES TO PHQ QUESTIONS 1-9: 9

## 2017-08-28 ASSESSMENT — PAIN SCALES - GENERAL: PAINLEVEL: NO PAIN (0)

## 2017-08-28 NOTE — NURSING NOTE
"Chief Complaint   Patient presents with     Anxiety     Panel Management     UTD       Initial BP (!) 132/98 (BP Location: Right arm, Patient Position: Chair, Cuff Size: Adult Large)  Pulse 97  Temp 97.7  F (36.5  C) (Oral)  Resp 16  Ht 5' 5\" (1.651 m)  Wt 252 lb (114.3 kg)  SpO2 99%  BMI 41.93 kg/m2 Estimated body mass index is 41.93 kg/(m^2) as calculated from the following:    Height as of this encounter: 5' 5\" (1.651 m).    Weight as of this encounter: 252 lb (114.3 kg).  Medication Reconciliation: complete   Berna Munoz CMA      "

## 2017-08-28 NOTE — MR AVS SNAPSHOT
After Visit Summary   8/28/2017    Geneva Cooley    MRN: [id number]           Patient Information     Date Of Birth          1985        Visit Information        Provider Department      8/28/2017 11:30 AM Jdoy Coughlin APRN [facility]        Today's Diagnoses     Need for prophylactic vaccination and inoculation against influenza    -  1    Morbid obesity due to excess calories (H)        LILIA (generalized anxiety disorder)        Inattention          Care Instructions    Scream free parenting book by Feliz Fountain.    Start Wellbutrin (bupropion) as directed.    Phentermine prescription     [facility] counseling - call to set up an appointment for testing for ADHD.    Jody Coughlin, NP-C              Follow-ups after your visit        Additional Services     NEUROPSYCHOLOGY REFERRAL       Your provider has referred you to:    [facility] [place] [phone]   [email or web address]    All scheduling is subject to the client's specific insurance plan & benefits, provider/location availability, and provider clinical specialities.  Please arrive 15 minutes early for your first appointment and bring your completed paperwork.    Please be aware that coverage of these services is subject to the terms and limitations of your health insurance plan.  Call member services at your health plan with any benefit or coverage questions.    Please bring the following to your appointment:  >>   Any x-rays, CTs or MRIs which have been performed.  Contact the facility where they were done to arrange for  prior to your scheduled appointment.  Any new CT, MRI or other procedures ordered by your specialist must be performed at a [facility] facility or coordinated by your clinic's referral office.    >>   List of current medications   >>   This referral request   >>   Any documents/labs given to you for this referral                 "  Your next 10 appointments already scheduled     Aug 30, 2017 11:00 AM CDT   Weight Loss Visit with Vero Garrett Diet 1, RD   [facility] ([facility])    [address]  [place] [address] [phone]            Sep 13, 2017  2:00 PM CDT   Return Visit with Poonam Hare, PhD   [facility] ([facility])    [id number] [place] [address] [phone]              Who to contact     If you have questions or need follow up information about today's clinic visit or your schedule please contact [facility] directly at [phone].  Normal or non-critical lab and imaging results will be communicated to you by MyChart, letter or phone within 4 business days after the clinic has received the results. If you do not hear from us within 7 days, please contact the clinic through [employer] or phone. If you have a critical or abnormal lab result, we will notify you by phone as soon as possible.  Submit refill requests through [employer] or call your pharmacy and they will forward the refill request to us. Please allow 3 business days for your refill to be completed.          Additional Information About Your Visit        [employer] Information     [employer] gives you secure access to your electronic health record. If you see a primary care provider, you can also send messages to your care team and make appointments. If you have questions, please call your primary care clinic.  If you do not have a primary care provider, please call [phone] and they will assist you.        Care EveryWhere ID     This is your Care EveryWhere ID. This could be used by other organizations to access your [facility] medical records  [id number]        Your Vitals Were     Pulse Temperature Respirations Height Pulse Oximetry BMI (Body Mass Index)    97 97.7  F (36.5  C) (Oral) 16 5' 5\" (1.651 " m) 99% 41.93 kg/m2       Blood Pressure from Last 3 Encounters:   08/28/17 (!) 132/98   06/28/17 126/82   05/08/17 118/82    Weight from Last 3 Encounters:   08/28/17 252 lb (114.3 kg)   06/28/17 252 lb 4.8 oz (114.4 kg)   05/08/17 271 lb 6.4 oz (123.1 kg)              We Performed the Following     NEUROPSYCHOLOGY REFERRAL          Today's Medication Changes          These changes are accurate as of: 8/28/17 12:27 PM.  If you have any questions, ask your nurse or doctor.               Start taking these medicines.        Dose/Directions    buPROPion 150 MG 12 hr tablet   Commonly known as:  WELLBUTRIN SR   Used for:  LILIA (generalized anxiety disorder)   Started by:  Jody Coughlin APRN CNP        Take 1 tablet once daily and increase to 1 tablet twice daily after 4 to 7 days   Quantity:  60 tablet   Refills:  2            Where to get your medicines      These medications were sent to [facility] [address] [place] [address] [address] [address]     Phone:  [phone]     buPROPion 150 MG 12 hr tablet         Some of these will need a paper prescription and others can be bought over the counter.  Ask your nurse if you have questions.     Bring a paper prescription for each of these medications     phentermine 37.5 MG tablet                Primary Care Provider Office Phone # Fax #    CLAIRE Luna [phone]       [address] [place] [address] [address]  [facility] [address]        Equal Access to Services     RE CYR AH: Hadii aad ku hadasho Soomaali, waaxda luqadaha, qaybta kaalmada adeegyada, oscar idiin hayaan adeeg kharash la'aan . So [facility] [phone].    ATENCIÓN: Si habla español, tiene a tyler disposición servicios gratuitos de asistencia lingüística. Llame al [phone].    We comply with applicable federal civil rights laws and [place] laws. We do not discriminate on the basis of race, color, national origin, age, disability sex,  sexual orientation or gender identity.            Thank you!     Thank you for choosing [facility]  for your care. Our goal is always to provide you with excellent care. Hearing back from our patients is one way we can continue to improve our services. Please take a few minutes to complete the written survey that you may receive in the mail after your visit with us. Thank you!             Your Updated Medication List - Protect others around you: Learn how to safely use, store and throw away your medicines at www.disposemymeds.org.          This list is accurate as of: 8/28/17 12:27 PM.  Always use your most recent med list.                   Brand Name Dispense Instructions for use Diagnosis    buPROPion 150 MG 12 hr tablet    WELLBUTRIN SR    60 tablet    Take 1 tablet once daily and increase to 1 tablet twice daily after 4 to 7 days    LILIA (generalized anxiety disorder)       cyclobenzaprine 10 MG tablet    FLEXERIL    30 tablet    Take 1 tablet (10 mg) by mouth daily as needed for muscle spasms    Pain in joint involving ankle and foot, unspecified laterality       etonogestrel-ethinyl estradiol 0.12-0.015 MG/24HR vaginal ring    NUVARING    1 each    Place 1 ring every 21 days then remove for 1 week. Needs appointment for further refills    Encounter for surveillance of contraceptives       meloxicam 15 MG tablet    MOBIC     Take 15 mg by mouth daily        MOTRIN IB PO      Take 600 mg by mouth        naproxen 500 MG tablet    NAPROSYN    60 tablet    Take 1 tablet (500 mg) by mouth 2 times daily (with meals) As needed    LBP (low back pain)       phentermine 37.5 MG tablet    ADIPEX-P    30 tablet    Take 1 tablet (37.5 mg) by mouth every morning (before breakfast)    Morbid obesity due to excess calories (H)       VENTOLIN  (90 BASE) MCG/ACT Inhaler   Generic drug:  albuterol

## 2017-08-28 NOTE — PATIENT INSTRUCTIONS
Scream free parenting book by Feliz Fountain.    Start Wellbutrin (bupropion) as directed.    Phentermine prescription     [facility] counseling - call to set up an appointment for testing for ADHD.    SILVIA LoboC

## 2017-08-29 ASSESSMENT — PATIENT HEALTH QUESTIONNAIRE - PHQ9: SUM OF ALL RESPONSES TO PHQ QUESTIONS 1-9: 9

## 2017-08-29 ASSESSMENT — ANXIETY QUESTIONNAIRES: GAD7 TOTAL SCORE: 14

## 2017-08-30 ENCOUNTER — OFFICE VISIT (OUTPATIENT)
Dept: SURGERY | Facility: CLINIC | Age: 32
End: 2017-08-30
Payer: COMMERCIAL

## 2017-08-30 DIAGNOSIS — E66.01 MORBID OBESITY WITH BMI OF 40.0-44.9, ADULT (H): ICD-10-CM

## 2017-08-30 PROCEDURE — 97803 MED NUTRITION INDIV SUBSEQ: CPT | Performed by: DIETITIAN, REGISTERED

## 2017-08-30 NOTE — PROGRESS NOTES
PRE-SURGICAL WEIGHT LOSS NUTRITION APPOINTMENT  DATE OF VISIT: 2017  Name: ROB TOBIAS  : 1985  Gender: Female  MRN: [id number]  Age: 31  ASSESSMENT  REASON FOR VISIT:  ROB TOBIAS is a 31 year old Female presents today for a pre-surgical weight loss follow-up appointment.  DIAGNOSIS:  Class III Morbid Obesity.    ANTHROPOMETRICS:  Height: 65 inches  Initial Weight: 252 lbs  Weight last visit: 251.0 lbs  Current Weight: 248.1 lbs  BMI: 41.3 kg/m2    NUTRITION HISTORY:  Breakfast: skipping 2-3 X per week or cold cereal with milk-within 1 hour of waking  Lunch: subway or turkey sandwich-12:00   Supper: meatloaf, mashed potatoes, green beans or corn or turkey burger on bun-5:00-6:00   Snacks: occasional granola bar  Drinks: water, enhanced diaz, skim milk  Consuming liquid calories: Milk  Eating 3 meals per day: No  Eating slower: Yes  Chewing foods thoroughly: Yes  Take 30 minutes to consume each meal: Yes  Fluids and meals separate by at least 30 minutes: Sometimes  Comments: Patient asking about the use of Herbal Life protein drinks; reviewed nutritional information and determined this product is slightly high in calories/ sugar; pt is willing to try other products; 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  Desired Surgical Procedure: gastric bypass  PHYSICAL ACTIVITY:  None  DIAGNOSIS:  Previous Nutrition Diagnosis: Obesity related to excess energy intake as evidenced by BMI of 41.8  no change, modified below  Previous goals:  Avoid all carbonation-met  Avoid liquids 30 minutes before and 30 minute after meals-not met  Current Nutrition Diagnosis: Obesity related to excess energy intake as evidenced by BMI of 41.3  IMPLEMENTATION:  Nutrition Prescription: Recommended energy/nutrient modification  Goals:  Exercise 5 X per week for 30 minutes  Avoid fluids 30 minutes before and 30 minutes after meals  Eat breakfast daily  Implementation:  - Discussed progress  towards previous goals  -Congratulated pt on her weight loss  - Reinforced importance of making behavior changes in preparation for bariatric surgery.  NUTRITION MONITORING AND EVALUATION:  Anticipated compliance: fair-good  Patient verbalized good understanding of bariatric diet guidelines.    Follow up: 1 month  # of visits needed: 3  Cleared by RD: No  TIME SPENT WITH PATIENT: 23 minutes  Albert Rocha RD, LD  [facility]  [phone]

## 2017-08-30 NOTE — MR AVS SNAPSHOT
MRN:[id number]                      After Visit Summary   8/30/2017    Geneva Cooley    MRN: [id number]           Visit Information        Provider Department      8/30/2017 11:00 AM 1, Vero Garrett Diet, [facility]      Your next 10 appointments already scheduled     Sep 13, 2017  2:00 PM CDT   Return Visit with Poonam Hare, PhD   [facility] ([facility])    [address] [address] [place] [address] [phone]            Sep 27, 2017 10:30 AM CDT   Weight Loss Visit with Vero Garrett Diet 1, RD   [facility] ([facility])    [address] [place] [address] [phone]              MyChart Information     [employer] gives you secure access to your electronic health record. If you see a primary care provider, you can also send messages to your care team and make appointments. If you have questions, please call your primary care clinic.  If you do not have a primary care provider, please call [phone] and they will assist you.        Care EveryWhere ID     This is your Care EveryWhere ID. This could be used by other organizations to access your [facility] medical records  [id number]        Equal Access to Services     RUBEN CYR : Hadii barbi webster Soaravind, waaxda luqadaha, qaybta kaalmada adeegyada, oscar mata. So [facility] [phone].    ATENCIÓN: Si habla español, tiene a tyelr disposición servicios gratuitos de asistencia lingüística. Llame al [phone].    We comply with applicable federal civil rights laws and [place] laws. We do not discriminate on the basis of race, color, national origin, age, disability sex, sexual orientation or gender identity.

## 2017-09-13 ENCOUNTER — OFFICE VISIT (OUTPATIENT)
Dept: PSYCHOLOGY | Facility: CLINIC | Age: 32
End: 2017-09-13
Payer: COMMERCIAL

## 2017-09-13 DIAGNOSIS — F41.1 GAD (GENERALIZED ANXIETY DISORDER): Primary | ICD-10-CM

## 2017-09-13 PROCEDURE — 96101 HC PSYCHOLOGICAL TEST BY PSYCHOLOGIST/MD, PER HR: CPT | Performed by: PSYCHOLOGIST

## 2017-09-13 PROCEDURE — 90832 PSYTX W PT 30 MINUTES: CPT | Mod: 59 | Performed by: PSYCHOLOGIST

## 2017-09-13 NOTE — Clinical Note
Alonzo,  I met with Geneva today for our one month follow up session. She is cleared for surgery from a psychological stand point. Her anxiety is adequately managed and she demonstrated the ability to make lifestyle changes. Please let me know if you have questions or concerns.  Thank you! Poonam Hare, PhD, LP

## 2017-09-13 NOTE — PROGRESS NOTES
"Progress Note     Client Name: Geneva oColey  Date: 17  Service Type: Individual  Session Start Time: 2:00  Session End Time: 2:35   Session Length: 35 minutes   Billin minutes 41390, 2 hour 93904 for MMPI-2 interpretation and integration/report writing   Session #: 3  Attendees: Client attended alone    DATA:   Progress Since Last Session (Related to Symptoms / Goals / Homework):  Symptoms: Stable    Homework: Achieved Good progress on goals (see below for more details)      Client reported that she has struggled with weight management since childhood (middle school). She stated, \"I have always been overweight ever since I can remember.\" Client reported she has attempted to lose weight in the past through prescription weight loss medications and watching portion sizes. The client reported she believes the surgery will benefit her by improving overall health and increasing mobility. She stated, \"I'm currently unable to do things with my daughter. I need to get ahead of the issue to beat it before it takes my life.\" Client has attempted to resolve these concerns in the past through diets, medications. She explained that she gained weight over the last year while experiencing significant stress as her mother's health was declining. Client explained that she spent a lot of time caring for her mother stating, \"We would bring food over or just get something to eat out of convenience. I know I gained a lot of weight during that process.\" She reported that the past year was a \"rollercoaster\" and explained that her mother's health often wavered. Her mother passed away in 2016, the day before . Client reported feeling \"sad and numb\" after the death of her mother. Client reported the following biological family members or relatives with mental health issues: Mother experienced Depression and sister may have Bipolar disorder. Her daughter has been diagnosed with Anxiety. Client has received the " "following mental health services in the past: counseling and medication(s) from physician / PCP. She reported that she has been diagnosed with Anxiety and Depression in the past (around ), stating, \"I was really stressed out; I didn't have kids at the time but my ex- had 2 kids and I was taking care of them because their mother wasn't around.\" Client reported she may have participated in therapy when she was younger and was going through foster care. She reported she was prescribed Zoloft for Depression but indicated that, at her last visit with her PCP (after her mother ), \"She didn't prescribe anything and we talked about how I wasn't depressed but was grieving the loss of my mom.\" Hospitalizations: None. Client reported no personal issues with chemical dependence bur reported her father reportedly uses alcohol and her Paternal Grandfather reportedly used alcohol. She reported that her mother and brother struggled with gambling addiction. Client is not currently receiving any mental health services. She is connected with the [facility] team to achieve pre-surgery goals.    Client reported a history of one marriage. She was  from [id number] and indicated that she remains friends with her ex-. She is able to co-parent with an ex-boyfriend who is her daughter's father. Client has been  for 10 years. Client reported having one child; a 7 year old daughter named Tia. Her daughter has been diagnosed with Anxiety. Client identified some stable and meaningful social connections. Client reported that she has not been involved with the legal system. Client's highest education level was GED and some college. Client reported she did not attend \"regular\" high school and stated, \"I just didn't think I needed school.\" She reported she obtained her GED approximately five years ago. Client did identify the following learning problems: attention and concentration. " "She is currently attending [employer] for a degree in medical coding. She reported she has attended classes for 4 years and explained that she has had to withdraw or drop classes at times. She described having difficulty focusing and reading materials, stating, \"If it's not interesting I can't get into it.\" Client did not serve in the .    Client reported she has been considering having bariatric surgery for the past year, stating  Things have really settled down since the last year so it feels like it might be the right time.  Client decided to pursue weight loss surgery at this time to improve overall health and longevity. She stated,  I can t go play with my daughter and do things that she wants to do. It restricts what I can physically do, even things like going to the bathroom or getting my shoes on. I want to like myself and feel better. It has interfered with my daily life. I don t want people to look at my. I don t have any medical issues yet but my family does and I want to avoid developing those.  Client believes that bariatric surgery is her best option for maintaining weight loss over time. She is considering the Alin-en-Y (bypass) procedure.     Since starting working with [facility], Client has worked closely with her bariatric team in making some important changes to eating habits and activity level along with addressing medical issues. Initial changes made include: reducing caffeine intake, abstaining from carbonated beverages, taking vitamins and extending meal time to 30 minutes. 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 She reported that she has not had much time to exercise given a  hectic  schedule.      Client reported a clear understanding of the risks of surgery, which she has considered against the " risks of not pursuing surgery. She believes she is ready and well informed at this time. She understands the need for ongoing lifestyle changes to eating and activity patterns, as well as the need for lifelong vitamin supplementation. She reported that family and friends are supportive of the decision to pursue surgery. Client s friends and neighbors have committed to supporting her during the immediate postsurgical and recovery period thereafter.     Current / Ongoing Stressors and Concerns:  Work stress; academic stress       Treatment Objective(s) Addressed in This Session:   -Complete psychological assessment for bariatric surgery  -Provide feedback about results and support for psychological needs that may arise during process     Intervention:     Completed one month follow-up appointment for pre-bariatric psychological assessment. Reviewed client's progress with homework over the past month. Client reported that she has had success with continuing with nutritionist's recommendations regarding food intake and beverages, cutting carbonated beverages, not drinking liquids 30 minutes before/ after meals,  chewing food to applesauce consistency, and meal planning. Client reported she has been busy with taking care of her friends  home, their pets, and working as a . She explained that she has lost 27 pounds since the beginning of the summer and feels like she has more energy. She reported that she used to nap each day but no longer needs to do so. She reported that she is exercising by walking the dogs and taking bike rides with her daughter.     Discussed changes that may occur in relationships following weight loss surgery and how to manage these changes. She has already talked to family and friends about anticipated changes.     Reviewed results of the MMPI-2 evaluation (see Documentation Only note dated 6/20/17 for complete report). Responses yielded a profile that is within normal limits, with no  severe psychological symptoms at this time. Overall, this client is reporting low levels of psychological distress and good adjustment to life. MMPI test results were consistent with self-report upon direct interview.     Questions were answered along with a discussion about results of this psychological evaluation adding insight into fostering ways to reach health goals and post-surgical recovery (e.g., reaching out to friends and family for support, building up healthy strategies to cope with stress, etc).      Reviewed results of evaluation and recommendations. Client agreed with results and had no further questions or concerns at this time.         ASSESSMENT: Current Emotional / Mental Status (status of significant symptoms):  Risk status (Self / Other harm or suicidal ideation)  Client denies current fears or concerns for personal safety.  Client denies current or recent suicidal ideation or behaviors.  Client denies current or recent homicidal ideation or behaviors.  Client denies current or recent self-injurious behavior or ideation.  Client denies other safety concerns.  A safety and risk management plan has not been developed at this time, however client was given the after-hours number should there be a change in any of these risk factors.     Appearance: Appropriate   Eye Contact: Good   Psychomotor Behavior: Normal   Attitude: Cooperative   Orientation: All  Speech  Rate / Production: Normal   Volume: Normal   Mood: Normal  Affect: Appropriate   Thought Content: Clear   Thought Form: Coherent Logical   Insight: Good          Medication Review:  Client is not currently prescribed medications.     Medication Compliance:  NA      Changes in Health Issues:  None reported     Chemical Use Review:  Substance Use: Chemical use reviewed, no active concerns identified.   Tobacco Use: No current or history of tobacco use.      Collateral Reports Completed:  MMPI-2     Summary and Final Recommendation:  From a  "psychological standpoint, Client is cleared to continue in the process for pursuing bariatric surgery. To summarize the 3 primary conditions being evaluated in the psychological assessment:     1. Client is prepared to comply with ongoing aftercare and lifestyle changes after surgery--condition satisfied  Client has made some meaningful initial changes to eating and activity habits. Client reported an understanding of the need for lifestyle changes to eating and activity habits. Client expressed a willingness to maintain lifestyle changes to eating and activity habits after surgery.      2. Client is emotionally stable to proceed with surgery--condition satisfied  Client has a history of mental health diagnosis (Depression and Anxiety). She reported she has participated in individual counseling and was prescribed Zoloft in the past. She indicated that, at her last visit with her PCP (after her mother ), \"She didn't prescribe anything and we talked about how I wasn't depressed but was grieving the loss of my mom.  Client s mental health condition is evaluated as stable and adequately managed at this time. She is not reporting current distress or significant anxiety symptomology. Client is functioning generally well in daily life and adequately managing current stressors. This is consistent with information obtained on the MMPI-2 and through the clinical interview.    3. Client is cognitively capable of understanding the risks of the procedure--condition satisfied  Client has demonstrated understanding of the risks of surgery compared to the risks of not having surgery.      PLAN: (Client Tasks / Therapist Tasks / Other)  1. Client has completed the psychological assessment required for bariatric surgery. This summary will be forwarded to the weight loss surgery clinic for review (complete report available in Client's medical record under [facility] Extended Documentation). Client will continue to work with bariatric " team in preparation for surgery.  2. Recommend standard post-surgical follow up, with sessions 1 and 3 months post-surgery, to assess Client's functioning and adjustment post-surgical lifestyle.   3. Client was encouraged to attend a weight loss surgery support group for additional accountability and support. Client was provided with my contact information and is aware that I can be contacted sooner than post-surgery session if needed.     Poonam Hare, PhD, LP

## 2017-09-13 NOTE — MR AVS SNAPSHOT
MRN:[id number]                      After Visit Summary   9/13/2017    Geneva Cooley    MRN: [id number]           Visit Information        Provider Department      9/13/2017 2:00 PM Poonam Hare, PhD [facility]      Your next 10 appointments already scheduled     Sep 27, 2017 10:30 AM CDT   Weight Loss Visit with Sh [facility] 1, RD   [facility] ([facility])    [address] [place], [place] [id number]  [facility] [id number] [phone]              MyChart Information     [employer] gives you secure access to your electronic health record. If you see a primary care provider, you can also send messages to your care team and make appointments. If you have questions, please call your primary care clinic.  If you do not have a primary care provider, please call [phone] and they will assist you.        Care EveryWhere ID     This is your Care EveryWhere ID. This could be used by other organizations to access your [facility]  [id number]        Equal Access to Services     RUBEN CYR : Hadii barbi webster Soaravind, waaxda luqadaha, qaybta kaalmagene adeendy, oscar cárdenas . So [facility] [place] [phone].    ATENCIÓN: Si habla español, tiene a tyler disposición servicios gratuitos de asistencia lingüística. Llame al [phone].    We comply with applicable federal civil rights laws and [place] laws. We do not discriminate on the basis of race, color, national origin, age, disability sex, sexual orientation or gender identity.

## 2017-09-26 ENCOUNTER — TELEPHONE (OUTPATIENT)
Dept: FAMILY MEDICINE | Facility: OTHER | Age: 32
End: 2017-09-26

## 2017-09-26 NOTE — TELEPHONE ENCOUNTER
Requested Provider:  anyone    PCP: Jody Coughlin    Reason for visit: patient thinks she has cellulitis in her legs     Duration of symptoms: 2 days    Have you been treated for this in the past? No    Additional comments:

## 2017-09-26 NOTE — PROGRESS NOTES
"  SUBJECTIVE:                                                    Geneva Cooley is a 31 year old female who presents to clinic today for the following health issues:      HPI     Answers for HPI/ROS submitted by the patient on 9/27/2017   If you checked off any problems, how difficult have these problems made it for you to do your work, take care of things at home, or get along with other people?: Not difficult at all  PHQ9 TOTAL SCORE: 5  LILIA 7 TOTAL SCORE: 7    Lumps and pain on groin  Painful bumps on inner thigh, along underwear line. Very tender and painful when walking, typically happens when wearing jeans. Will go away in a couple days, sometimes not as inflamed and swelling goes down, sometimes drains blood. Did not get these until dad bought a repossessed vehicle from [place] in 2006, a spider fell down from visor and started to \"freak out\" because [place] has poisonous spiders. Brushed spider off, but never found spider and bumps showed up shortly after.   Has been seen for symptoms but was told it was razor burn and cellulitis and have had procedure done on area.     Low back pain  Low back pain on left and right  No radiation into the buttocks or thighs  Going on for several years  Aching pain  Sitting for long periods of time aggravates  Meloxicam, heat helps pain    Problem list and histories reviewed & adjusted, as indicated.  Additional history: as documented    Labs reviewed in EPIC    ROS:  Constitutional, HEENT, cardiovascular, pulmonary, gi and gu systems are negative, except as otherwise noted.      OBJECTIVE:   /80 (BP Location: Right arm, Patient Position: Sitting, Cuff Size: Adult Regular)  Pulse 100  Temp 97.8  F (36.6  C) (Oral)  Resp 16  Ht 5' 5\" (1.651 m)  Wt 250 lb 6.4 oz (113.6 kg)  SpO2 99%  BMI 41.67 kg/m2  Body mass index is 41.67 kg/(m^2).  GENERAL: healthy, alert and no distress  RESP: lungs clear to auscultation - no rales, rhonchi or wheezes  CV: regular rate and " rhythm, normal S1 S2, no S3 or S4, no murmur, click or rub, no peripheral edema  MS: no gross musculoskeletal defects noted, no edema  SKIN: several erythematous comedones that are tender to palpation on bilateral groin and left inner inferior buttock, three tender subcutaneous tender mobile firm lumps on right groin and one on left. Multiple areas of purplish hyperpigmentation consistent with healed old lesions on bilateral groin  BACK: tenderness to palpation of bilateral lumbar, FROM, neg straight leg raises, reflexes normal  NEURO: Normal strength and tone, mentation intact and speech normal  PSYCH: mentation appears normal, affect normal/bright    Diagnostic Test Results:  none     ASSESSMENT/PLAN:     1. Hidradenitis suppurativa  Patient has symptoms consistent with hidradenitis suppurativa due to tender subcutaneous lumps and scarring from previous lesions. Treat with doxycycline and follow up if symptoms persist or worsen.   - doxycycline (VIBRAMYCIN) 100 MG capsule; Take 1 capsule (100 mg) by mouth 2 times daily  Dispense: 20 capsule; Refill: 0    2. Chronic bilateral low back pain without sciatica  Stable on meloxicam. Refills sent.  - meloxicam (MOBIC) 15 MG tablet; Take 1 tablet (15 mg) by mouth daily  Dispense: 30 tablet; Refill: 1    CLAIRE Lundberg [facility]

## 2017-09-26 NOTE — TELEPHONE ENCOUNTER
"Geneva Cooley is a 31 year old female who calls with bumps.    NURSING ASSESSMENT:  Description: It was very hard for patient to describe her symptoms. It sounds like she has 3-4 bumps in creases of her body that are very painful. One of them she tried to pop and it immediately started bleeding. It now does not have a head on it, but the others do have a head. They don't itch. The one she tried to pop now looks like \"it has a sac of something under it\".   Onset/duration:  1 week  Precip. factors:  Unknown  Associated symptoms:  Pain  Improves/worsens symptoms:  Tried to pop one and it made it worse. Hasn't tried anything else.  Pain scale (0-10) painful to the touch  Allergies:   Allergies   Allergen Reactions     Vicodin [Hydrocodone-Acetaminophen] Hives       RECOMMENDED DISPOSITION:  See in 24 hours -     Next 5 appointments (look out 90 days)     Sep 27, 2017 11:20 AM CDT   [facility] C[facility]   [facility]    [address] [place] [id number]   [phone]                  Will comply with recommendation: YES   If further questions/concerns or if Sx do not improve, worsen or new Sx develop, call your PCP or [facility] as soon as possible.    NOTES:  Disposition was determined by the first positive assessment question, therefore all previous assessment questions were negative.     Guideline used:  Telephone Triage Protocols for Nurses, Fifth Edition, Kristin Hernandez, RN, BSN      "

## 2017-09-27 ENCOUNTER — OFFICE VISIT (OUTPATIENT)
Dept: FAMILY MEDICINE | Facility: OTHER | Age: 32
End: 2017-09-27
Payer: COMMERCIAL

## 2017-09-27 ENCOUNTER — OFFICE VISIT (OUTPATIENT)
Dept: SURGERY | Facility: CLINIC | Age: 32
End: 2017-09-27
Payer: COMMERCIAL

## 2017-09-27 VITALS
BODY MASS INDEX: 41.72 KG/M2 | HEART RATE: 100 BPM | RESPIRATION RATE: 16 BRPM | TEMPERATURE: 97.8 F | WEIGHT: 250.4 LBS | OXYGEN SATURATION: 99 % | DIASTOLIC BLOOD PRESSURE: 80 MMHG | SYSTOLIC BLOOD PRESSURE: 116 MMHG | HEIGHT: 65 IN

## 2017-09-27 DIAGNOSIS — E66.01 MORBID OBESITY WITH BMI OF 40.0-44.9, ADULT (H): ICD-10-CM

## 2017-09-27 DIAGNOSIS — L73.2 HIDRADENITIS SUPPURATIVA: Primary | ICD-10-CM

## 2017-09-27 DIAGNOSIS — G89.29 CHRONIC BILATERAL LOW BACK PAIN WITHOUT SCIATICA: ICD-10-CM

## 2017-09-27 DIAGNOSIS — M54.50 CHRONIC BILATERAL LOW BACK PAIN WITHOUT SCIATICA: ICD-10-CM

## 2017-09-27 PROCEDURE — 99214 OFFICE O/P EST MOD 30 MIN: CPT | Performed by: STUDENT IN AN ORGANIZED HEALTH CARE EDUCATION/TRAINING PROGRAM

## 2017-09-27 PROCEDURE — 97803 MED NUTRITION INDIV SUBSEQ: CPT | Performed by: DIETITIAN, REGISTERED

## 2017-09-27 RX ORDER — DOXYCYCLINE 100 MG/1
100 CAPSULE ORAL 2 TIMES DAILY
Qty: 20 CAPSULE | Refills: 0 | Status: SHIPPED | OUTPATIENT
Start: 2017-09-27 | End: 2017-11-17

## 2017-09-27 RX ORDER — MELOXICAM 15 MG/1
15 TABLET ORAL DAILY
Qty: 30 TABLET | Refills: 1 | Status: SHIPPED | OUTPATIENT
Start: 2017-09-27 | End: 2018-01-26

## 2017-09-27 ASSESSMENT — ANXIETY QUESTIONNAIRES
7. FEELING AFRAID AS IF SOMETHING AWFUL MIGHT HAPPEN: NOT AT ALL
5. BEING SO RESTLESS THAT IT IS HARD TO SIT STILL: SEVERAL DAYS
6. BECOMING EASILY ANNOYED OR IRRITABLE: SEVERAL DAYS
2. NOT BEING ABLE TO STOP OR CONTROL WORRYING: SEVERAL DAYS
GAD7 TOTAL SCORE: 7
4. TROUBLE RELAXING: SEVERAL DAYS
3. WORRYING TOO MUCH ABOUT DIFFERENT THINGS: SEVERAL DAYS
1. FEELING NERVOUS, ANXIOUS, OR ON EDGE: MORE THAN HALF THE DAYS
GAD7 TOTAL SCORE: 7
GAD7 TOTAL SCORE: 7
7. FEELING AFRAID AS IF SOMETHING AWFUL MIGHT HAPPEN: NOT AT ALL

## 2017-09-27 ASSESSMENT — PATIENT HEALTH QUESTIONNAIRE - PHQ9
SUM OF ALL RESPONSES TO PHQ QUESTIONS 1-9: 5
SUM OF ALL RESPONSES TO PHQ QUESTIONS 1-9: 5
10. IF YOU CHECKED OFF ANY PROBLEMS, HOW DIFFICULT HAVE THESE PROBLEMS MADE IT FOR YOU TO DO YOUR WORK, TAKE CARE OF THINGS AT HOME, OR GET ALONG WITH OTHER PEOPLE: NOT DIFFICULT AT ALL

## 2017-09-27 ASSESSMENT — PAIN SCALES - GENERAL: PAINLEVEL: MODERATE PAIN (5)

## 2017-09-27 NOTE — PATIENT INSTRUCTIONS
Doxycycline 100 twice daily for 10 days.    Consider topical antibiotic if this continues to recur.    SILVIA LoboC

## 2017-09-27 NOTE — LETTER
My Depression Action Plan  Name: Geneva Cooley   Date of Birth 1985  Date: 9/26/2017    My doctor: Jody Coughlin   My clinic: [address]  [facility] [address] [phone]          GREEN    ZONE   Good Control    What it looks like:     Things are going generally well. You have normal up s and down s. You may even feel depressed from time to time, but bad moods usually last less than a day.   What you need to do:  1. Continue to care for yourself (see self care plan)  2. Check your depression survival kit and update it as needed  3. Follow your physician s recommendations including any medication.  4. Do not stop taking medication unless you consult with your physician first.           YELLOW         ZONE Getting Worse    What it looks like:     Depression is starting to interfere with your life.     It may be hard to get out of bed; you may be starting to isolate yourself from others.    Symptoms of depression are starting to last most all day and this has happened for several days.     You may have suicidal thoughts but they are not constant.   What you need to do:     1. Call your care team, your response to treatment will improve if you keep your care team informed of your progress. Yellow periods are signs an adjustment may need to be made.     2. Continue your self-care, even if you have to fake it!    3. Talk to someone in your support network    4. Open up your depression survival kit           RED    ZONE Medical Alert - Get Help    What it looks like:     Depression is seriously interfering with your life.     You may experience these or other symptoms: You can t get out of bed most days, can t work or engage in other necessary activities, you have trouble taking care of basic hygiene, or basic responsibilities, thoughts of suicide or death that will not go away, self-injurious behavior.     What you need to do:  1. Call your care  team and request a same-day appointment. If they are not available (weekends or after hours) call your local crisis line, emergency room or 911.      Electronically signed by: Ramona Whiting, September 26, 2017    Depression Self Care Plan / Survival Kit    Self-Care for Depression  Here s the deal. Your body and mind are really not as separate as most people think.  What you do and think affects how you feel and how you feel influences what you do and think. This means if you do things that people who feel good do, it will help you feel better.  Sometimes this is all it takes.  There is also a place for medication and therapy depending on how severe your depression is, so be sure to consult with your medical provider and/ or Behavioral Health Consultant if your symptoms are worsening or not improving.     In order to better manage my stress, I will:    Exercise  Get some form of exercise, every day. This will help reduce pain and release endorphins, the  feel good  chemicals in your brain. This is almost as good as taking antidepressants!  This is not the same as joining a gym and then never going! (they count on that by the way ) It can be as simple as just going for a walk or doing some gardening, anything that will get you moving.      Hygiene   Maintain good hygiene (Get out of bed in the morning, Make your bed, Brush your teeth, Take a shower, and Get dressed like you were going to work, even if you are unemployed).  If your clothes don't fit try to get ones that do.    Diet  I will strive to eat foods that are good for me, drink plenty of water, and avoid excessive sugar, caffeine, alcohol, and other mood-altering substances.  Some foods that are helpful in depression are: complex carbohydrates, B vitamins, flaxseed, fish or fish oil, fresh fruits and vegetables.    Psychotherapy  I agree to participate in Individual Therapy (if recommended).    Medication  If prescribed medications, I agree to take them.   Missing doses can result in serious side effects.  I understand that drinking alcohol, or other illicit drug use, may cause potential side effects.  I will not stop my medication abruptly without first discussing it with my provider.    Staying Connected With Others  I will stay in touch with my friends, family members, and my primary care provider/team.    Use your imagination  Be creative.  We all have a creative side; it doesn t matter if it s oil painting, sand castles, or mud pies! This will also kick up the endorphins.    Witness Beauty  (AKA stop and smell the roses) Take a look outside, even in mid-winter. Notice colors, textures. Watch the squirrels and birds.     Service to others  Be of service to others.  There is always someone else in need.  By helping others we can  get out of ourselves  and remember the really important things.  This also provides opportunities for practicing all the other parts of the program.    Humor  Laugh and be silly!  Adjust your TV habits for less news and crime-drama and more comedy.    Control your stress  Try breathing deep, massage therapy, biofeedback, and meditation. Find time to relax each day.     My support system    Clinic Contact:  Phone number:    Contact 1:  Phone number:    Contact 2:  Phone number:    Adventism/:  Phone number:    Therapist:  Phone number:    Local crisis center:    Phone number:    Other community support:  Phone number:

## 2017-09-27 NOTE — PROGRESS NOTES
"PRE-SURGICAL WEIGHT LOSS NUTRITION APPOINTMENT  DATE OF VISIT: 2017  Name: ROB TOBIAS  : 1985  Gender: Female  MRN: [id number]  Age: 31  ASSESSMENT  REASON FOR VISIT:  ROB TOBIAS is a 31 year old Female presents today for a pre-surgical weight loss follow-up appointment.  DIAGNOSIS:  Class III Obesity    ANTHROPOMETRICS:  Height: 65 inches  Initial Weight: 252 lbs  Weight last visit: 248.1 lbs  Current Weight: 249.4 lbs  BMI: 41.5 kg/m2    NUTRITION HISTORY:  Breakfast: banana or granola bar, sometimes oatmeal within an hour of waking, then will have yogurt once gets to work   Lunch: turkey + vegetable sandwich from subway   Supper: meatloaf, green beans or corn or turkey burger on bun  Snacks: granola bar or pretzels (90kcal packs) if needs them on long bus rides  Drinks: water, enhanced water  Consuming liquid calories: Milk  Eating 3 meals per day: Yes  Eating slower: Yes  Chewing foods thoroughly: Yes  Take 30 minutes to consume each meal: Yes  Fluids and meals separate by at least 30 minutes: Sometimes  Comments: patient is most interested in weight loss surgery to \"keep up\" with her 5 year old daughter; in discussing binge eating pt admits she does over eat if she goes too many hours; pt has 2 friends that have had weight loss surgery (one successful the other not successful) 17-Patient asking about the use of Herbal Life protein drinks; reviewed nutritional information and determined this product is slightly high in calories/ sugar; pt is willing to try other products     : Pt states that her activity has decreased over the last month d/t summer job ending; has compensated by decreasing portions and increasing exercise.   Desired Surgical Procedure: Laparoscopic Gastric Bypass  PHYSICAL ACTIVITY:  Type: Exercise bike;Walking  Frequency: 5-6x/week  Duration (min): 30  DIAGNOSIS:  Previous Nutrition Diagnosis: Obesity related to excess energy intake as evidenced by BMI of " 41.3kg/m2  Unchanged, modified below.   Previous goals:  Eat breakfast daily - met   Exercise 5 X per week for 30 minutes - met  Current Nutrition Diagnosis: Obesity related to excess energy intake as evidenced by BMI of 41.5 kg/m2.   IMPLEMENTATION:  Nutrition Prescription: Recommended energy/nutrient modification  Goals:  Research alternatives to current multivitamin (not palatable) and calcium supplements (too big)(reviewed guidelines and provided examples)   Continue current exercise routine   Have protein with breakfast   Continue to practice  fluids and meals by 30 minutes.   Implementation:  - Discussed progress towards previous goals  - Reinforced importance of making behavior changes in preparation for bariatric surgery.  NUTRITION MONITORING AND EVALUATION:  Anticipated compliance: Good  Patient verbalized good understanding of bariatric diet guidelines.  Follow up: 1 month  # of visits needed: 2  Cleared by RD:No  TIME SPENT WITH PATIENT: 30 minutes  Shilpa Gonzalez RD, LD  Clinical Dietitian

## 2017-09-27 NOTE — NURSING NOTE
"Chief Complaint   Patient presents with     Mass     painful lumps     Panel Management     flu, dap, phq9       Initial /80 (BP Location: Right arm, Patient Position: Sitting, Cuff Size: Adult Regular)  Pulse 100  Temp 97.8  F (36.6  C) (Oral)  Resp 16  Ht 5' 5\" (1.651 m)  Wt 250 lb 6.4 oz (113.6 kg)  SpO2 99%  BMI 41.67 kg/m2 Estimated body mass index is 41.67 kg/(m^2) as calculated from the following:    Height as of this encounter: 5' 5\" (1.651 m).    Weight as of this encounter: 250 lb 6.4 oz (113.6 kg).  Medication Reconciliation: complete   Berna Munoz CMA      "

## 2017-09-27 NOTE — MR AVS SNAPSHOT
After Visit Summary   9/27/2017    Geneva Cooley    MRN: [id number]           Patient Information     Date Of Birth          1985        Visit Information        Provider Department      9/27/2017 11:20 AM Jody Coughlin APRN CNP [facility]        Today's Diagnoses     Need for prophylactic vaccination and inoculation against influenza    -  1    Hidradenitis suppurativa        Chronic bilateral low back pain without sciatica          Care Instructions    Doxycycline 100 twice daily for 10 days.    Consider topical antibiotic if this continues to recur.    SILVIA LoboC            Follow-ups after your visit        Your next 10 appointments already scheduled     Oct 26, 2017 10:00 AM CDT   Weight Loss Visit with [facility] Diet 3, RD   [facility] ([facility])    [address] [place] [address] [phone]              Who to contact     If you have questions or need follow up information about today's clinic visit or your schedule please contact [facility] directly at [phone].  Normal or non-critical lab and imaging results will be communicated to you by [employer], letter or phone within 4 business days after the clinic has received the results. If you do not hear from us within 7 days, please contact the clinic through [employer] or phone. If you have a critical or abnormal lab result, we will notify you by phone as soon as possible.  Submit refill requests through [employer] or call your pharmacy and they will forward the refill request to us. Please allow 3 business days for your refill to be completed.          Additional Information About Your Visit        [employer] Information     [employer] gives you secure access to your electronic health record. If you see a primary care provider, you can also send messages to your care team and make appointments. If you have  "questions, please call your primary care clinic.  If you do not have a primary care provider, please call [phone] and they will assist you.        Care EveryWhere ID     This is your Care EveryWhere ID. This could be used by other organizations to access your [facility] medical records  [id number]        Your Vitals Were     Pulse Temperature Respirations Height Pulse Oximetry BMI (Body Mass Index)    100 97.8  F (36.6  C) (Oral) 16 5' 5\" (1.651 m) 99% 41.67 kg/m2       Blood Pressure from Last 3 Encounters:   09/27/17 116/80   08/28/17 110/78   06/28/17 126/82    Weight from Last 3 Encounters:   09/27/17 250 lb 6.4 oz (113.6 kg)   08/28/17 252 lb (114.3 kg)   06/28/17 252 lb 4.8 oz (114.4 kg)              We Performed the Following     DEPRESSION ACTION PLAN (DAP)          Today's Medication Changes          These changes are accurate as of: 9/27/17 12:23 PM.  If you have any questions, ask your nurse or doctor.               Start taking these medicines.        Dose/Directions    doxycycline 100 MG capsule   Commonly known as:  VIBRAMYCIN   Used for:  Hidradenitis suppurativa   Started by:  Jody Coughlin APRN CNP        Dose:  100 mg   Take 1 capsule (100 mg) by mouth 2 times daily   Quantity:  20 capsule   Refills:  0            Where to get your medicines      These medications were sent to [facility] [address] [place] [address] [facility]  [address] [place] [address]     Phone:  [phone]     doxycycline 100 MG capsule    meloxicam 15 MG tablet                Primary Care Provider Office Phone # Fax #    CLAIRE Luna [phone]       [address] [place] [address]        Equal Access to Services     RUBEN CYR AH: Adam Couch, wajose franciscoda luqadaha, qaybta kaalmada cjyagene, oscar mata. Formerly [facility] [phone].    ATENCIÓN: Si habla gaby, tiene a tyler disposición servicios " kary de asistencia lingüística. Ana Luisa saunders [phone].    We comply with applicable federal civil rights laws and [place] laws. We do not discriminate on the basis of race, color, national origin, age, disability sex, sexual orientation or gender identity.            Thank you!     Thank you for choosing [facility]  for your care. Our goal is always to provide you with excellent care. Hearing back from our patients is one way we can continue to improve our services. Please take a few minutes to complete the written survey that you may receive in the mail after your visit with us. Thank you!             Your Updated Medication List - Protect others around you: Learn how to safely use, store and throw away your medicines at www.disposemymeds.org.          This list is accurate as of: 9/27/17 12:23 PM.  Always use your most recent med list.                   Brand Name Dispense Instructions for use Diagnosis    buPROPion 150 MG 12 hr tablet    WELLBUTRIN SR    60 tablet    Take 1 tablet once daily and increase to 1 tablet twice daily after 4 to 7 days    LILIA (generalized anxiety disorder)       cyclobenzaprine 10 MG tablet    FLEXERIL    30 tablet    Take 1 tablet (10 mg) by mouth daily as needed for muscle spasms    Pain in joint involving ankle and foot, unspecified laterality       doxycycline 100 MG capsule    VIBRAMYCIN    20 capsule    Take 1 capsule (100 mg) by mouth 2 times daily    Hidradenitis suppurativa       etonogestrel-ethinyl estradiol 0.12-0.015 MG/24HR vaginal ring    NUVARING    1 each    Place 1 ring every 21 days then remove for 1 week. Needs appointment for further refills    Encounter for surveillance of contraceptives       meloxicam 15 MG tablet    MOBIC    30 tablet    Take 1 tablet (15 mg) by mouth daily    Chronic bilateral low back pain without sciatica       MOTRIN IB PO      Take 600 mg by mouth        naproxen 500 MG tablet    NAPROSYN    60 tablet    Take 1 tablet  (500 mg) by mouth 2 times daily (with meals) As needed    LBP (low back pain)       phentermine 37.5 MG tablet    ADIPEX-P    30 tablet    Take 1 tablet (37.5 mg) by mouth every morning (before breakfast)    Morbid obesity due to excess calories (H)

## 2017-09-27 NOTE — MR AVS SNAPSHOT
MRN:[id number]                      After Visit Summary   9/27/2017    Geneva Cooley    MRN: [id number]           Visit Information        Provider Department      9/27/2017 10:00 AM [address]Vero RD [facility]      Your next 10 appointments already scheduled     Sep 27, 2017 11:20 AM CDT   Office Visit with CLAIRE Luna CNP   [facility] ([facility])    [address]  [facility] [id number] [phone]           Bring a current list of meds and any records pertaining to this visit. For Physicals, please bring immunization records and any forms needing to be filled out. Please arrive 10 minutes early to complete paperwork.            Oct 26, 2017 10:00 AM CDT   Weight Loss Visit with [facility] Diet 3, RD   [facility] ([facility])    [address] [place] [address] [phone]              MyChart Information     [employer] gives you secure access to your electronic health record. If you see a primary care provider, you can also send messages to your care team and make appointments. If you have questions, please call your primary care clinic.  If you do not have a primary care provider, please call [phone] and they will assist you.        Care EveryWhere ID     This is your Care EveryWhere ID. This could be used by other organizations to access your [facility] medical records  [id number]        Equal Access to Services     RUBEN CYR : Hadii barbi calverto Soaravind, waaxda luqadaha, qaybta kaalmada cjyada, waxilya regina cárdenas . So [facility] [phone].    ATENCIÓN: Si habla español, tiene a tyler disposición servicios gratuitos de asistencia lingüística. Llame al [phone].    We comply with applicable federal civil rights laws and [place] laws. We do  not discriminate on the basis of race, color, national origin, age, disability sex, sexual orientation or gender identity.

## 2017-09-28 ASSESSMENT — ANXIETY QUESTIONNAIRES: GAD7 TOTAL SCORE: 7

## 2017-09-28 ASSESSMENT — PATIENT HEALTH QUESTIONNAIRE - PHQ9: SUM OF ALL RESPONSES TO PHQ QUESTIONS 1-9: 5

## 2017-10-27 ENCOUNTER — OFFICE VISIT (OUTPATIENT)
Dept: SURGERY | Facility: CLINIC | Age: 32
End: 2017-10-27
Payer: COMMERCIAL

## 2017-10-27 DIAGNOSIS — E66.01 MORBID OBESITY WITH BMI OF 40.0-44.9, ADULT (H): ICD-10-CM

## 2017-10-27 PROCEDURE — 97803 MED NUTRITION INDIV SUBSEQ: CPT | Performed by: DIETITIAN, REGISTERED

## 2017-10-27 NOTE — PROGRESS NOTES
"PRE-SURGICAL WEIGHT LOSS NUTRITION APPOINTMENT  DATE OF VISIT: 10/27/2017  Name: ROB TOBIAS  : 1985  Gender: Female  MRN: [id number]  Age: 31  ASSESSMENT  REASON FOR VISIT:  ROB TBOIAS is a 31 year old Female presents today for a pre-surgical weight loss follow-up appointment.  DIAGNOSIS:  Class III  Morbid Obesity.    ANTHROPOMETRICS:  Height: 65 inches  Initial Weight: 252.0 lbs  Weight last visit: 249.4 lbs  Current Weight: 244.7 lbs  BMI: 40.7 kg/m2    NUTRITION HISTORY:  Breakfast: banana or granola bar, sometimes oatmeal within an hour of waking, then will have yogurt once gets to work   Lunch: turkey + vegetable sandwich from [employer]   Supper: meatloaf, green beans or corn or turkey burger on bun  Snacks: granola bar or pretzels (90kcal packs) if needs them on long bus rides  Drinks: water, enhanced water  Consuming liquid calories: Milk  Eating 3 meals per day: Yes  Eating slower: Yes  Chewing foods thoroughly: Yes  Take 30 minutes to consume each meal: Yes  Fluids and meals separate by at least 30 minutes: Sometimes  Comments: patient is most interested in weight loss surgery to \"keep up\" with her 5 year old daughter; in discussing binge eating pt admits she does over eat if she goes too many hours; pt has 2 friends that have had weight loss surgery (one successful the other not successful) 17-Patient asking about the use of Herbal Life protein drinks; reviewed nutritional information and determined this product is slightly high in calories/ sugar; pt is willing to try other products : Pt states that her activity has decreased over the last month d/t summer job ending; has compensated by decreasing portions and increasing exercise.  10/27/17 Continues to make positive behavior changes. 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  Desired Surgical Procedure: gastric bypass  PHYSICAL ACTIVITY:  Type: Exercise bike;Walking  Frequency: no intentional exercise, busy with " work  DIAGNOSIS:  Previous Nutrition Diagnosis: Obesity related to excess energy intake as evidenced by BMI of 41.5 kg/m2-no change  Previous goals:  Research alternatives to current multivitamin (not palatable) and calcium supplements (too big)(reviewed guidelines and provided examples)-not met  Continue current exercise routine-improving  Have protein with breakfast-not met  Continue to practice  fluids and meals by 30 minutes-improving  Current Nutrition Diagnosis: Obesity related to excess energy intake as evidenced by BMI of 40.7 kg/m2  IMPLEMENTATION:  Nutrition Prescription: Recommended energy/nutrient modification  Goals:  Continue to practice all prebariatric surgery diet guidelines  Exercise 3 times per week  Avoid liquids with meals  Implementation:  - Discussed progress towards previous goals  - Reinforced importance of making behavior changes in preparation for bariatric surgery.  NUTRITION MONITORING AND EVALUATION:  Anticipated compliance: Fair to good  Patient verbalized good understanding of bariatric diet guidelines.    Follow up: 1 month  # of visits needed: 1  Cleared by RD: No  TIME SPENT WITH PATIENT: 25 minutes  Isaak Hernandez, RD, LD  [facility] Outpatient Dietitian  [phone] (office phone)

## 2017-10-27 NOTE — MR AVS SNAPSHOT
MRN:[id number]                      After Visit Summary   10/27/2017    Geneva Cooley    MRN: [id number]           Visit Information        Provider Department      10/27/2017 11:00 AM 2, Vero Prado, RD [facility]      Your next 10 appointments already scheduled     Nov 29, 2017 11:00 AM CST   Weight Loss Visit with Vero [facility] Diet 3, RD   [facility] ([facility])    [address] [place] [address] [phone]              MyChart Information     [employer] gives you secure access to your electronic health record. If you see a primary care provider, you can also send messages to your care team and make appointments. If you have questions, please call your primary care clinic.  If you do not have a primary care provider, please call [phone] and they will assist you.        Care EveryWhere ID     This is your Care EveryWhere ID. This could be used by other organizations to access your [facility] medical records  [id number]        Equal Access to Services     RUBEN CYR : Hadii barbi calverto Soaravind, waaxda luqadaha, qaybta kaalmada adeendy, oscar mata. So [facility] [phone].    ATENCIÓN: Si habla español, tiene a tyler disposición servicios gratuitos de asistencia lingüística. Llame al [phone].    We comply with applicable federal civil rights laws and [place] laws. We do not discriminate on the basis of race, color, national origin, age, disability, sex, sexual orientation, or gender identity.

## 2017-11-15 ENCOUNTER — TELEPHONE (OUTPATIENT)
Dept: FAMILY MEDICINE | Facility: OTHER | Age: 32
End: 2017-11-15

## 2017-11-15 NOTE — TELEPHONE ENCOUNTER
Reason for Call:  Medication or medication refill:    Do you use a [facility]?  Name of the pharmacy and phone number for the current request:  [facility] - [phone]    Name of the medication requested: Phentermine     Other request: pt is almost out. Does she need an appt?     Can we leave a detailed message on this number? YES    Phone number patient can be reached at: Home number on file [phone] (home)    Best Time: any     Call taken on 11/15/2017 at 9:29 AM by Cris Lewis

## 2017-11-15 NOTE — TELEPHONE ENCOUNTER
Patient wondering what she can do for deodorant until appointment, states deodorant does not work for body odor. States her last option is using [employer] and [employer], has tried deodorants and does not help with sweating or odor.

## 2017-11-15 NOTE — TELEPHONE ENCOUNTER
There is not a lot we can do with the odor if deodorants are not working. We can talk about other options at the visit and talk about why it has become more of an issue lately.  Jody Coughlin, RICHARD-C

## 2017-11-15 NOTE — TELEPHONE ENCOUNTER
Reason for call:  Patient reporting a symptom    Symptom or request: rawness under arms from deodorant     Duration (how long have symptoms been present): ongoing issue     Have you been treated for this before? No    Additional comments: Pt reacts to many deodorants, she is wondering if she should be seen? Or what she can do at home? Is there a prescription deodorant she can use? She is down to one brand that she can use, but it does not help with odor very well.     Phone Number patient can be reached at:  Home number on file [phone] (home)    Best Time:  any    Can we leave a detailed message on this number:  YES    Call taken on 11/15/2017 at 9:26 AM by Cris Lewis

## 2017-11-15 NOTE — TELEPHONE ENCOUNTER
Lisandra SANFORD (R) contacted [facility] on 11/15/17 and left a message. If patient calls back please inform patient of message below, thanks

## 2017-11-15 NOTE — PROGRESS NOTES
"  SUBJECTIVE:                                                    Geneva Cooley is a 31 year old female who presents to clinic today for the following health issues:    HPI    Answers for HPI/ROS submitted by the patient on 11/17/2017   If you checked off any problems, how difficult have these problems made it for you to do your work, take care of things at home, or get along with other people?: Somewhat difficult  PHQ9 TOTAL SCORE: 7  LILIA 7 TOTAL SCORE: 9    Stopped Wellbutrin as it was making her feel more anxious and depressed. Feels mood is stable and that life stressors that come and go affect her mood but she is able to handle the stress and cope at this point without medication.     Medication Followup of Phentermine    Taking Medication as prescribed: yes    Side Effects:  None    Medication Helping Symptoms:  Yes    Lost 12 lbs.    Is working with weight loss surgeon to schedule bariatric surgery likely in January.    Is working on eating healthier and reducing stress       Patient is here to discuss excessive sweating and deodorant options. She has been using clinical strength deodorants, Degree, sprays, etc and these have been irritating to her skin. She is now using Arm and Hammer which worked for a period of time but is no longer controlling odor. She does not have a problem with excessive sweat but does have concern for excessive odor.     Problem list and histories reviewed & adjusted, as indicated.  Additional history: as documented    Labs reviewed in EPIC    ROS:  Constitutional, HEENT, cardiovascular, pulmonary, gi and gu systems are negative, except as otherwise noted.      OBJECTIVE:   /80 (BP Location: Right arm, Patient Position: Sitting, Cuff Size: Adult Large)  Pulse 84  Temp 98.3  F (36.8  C) (Temporal)  Resp 16  Ht 5' 5\" (1.651 m)  Wt 238 lb 8 oz (108.2 kg)  BMI 39.69 kg/m2  Body mass index is 39.69 kg/(m^2).  GENERAL: healthy, alert and no distress  EYES: Eyes grossly normal to " inspection, PERRL and conjunctivae and sclerae normal  HENT: ear canals and TM's normal, nose and mouth without ulcers or lesions  NECK: no adenopathy, no asymmetry, masses, or scars and thyroid normal to palpation  RESP: lungs clear to auscultation - no rales, rhonchi or wheezes  CV: regular rate and rhythm, normal S1 S2, no S3 or S4, no murmur, click or rub, no peripheral edema  ABDOMEN: soft, nontender, no hepatosplenomegaly, no masses and bowel sounds normal  MS: no gross musculoskeletal defects noted, no edema  SKIN: no suspicious lesions or rashes  NEURO: Normal strength and tone, mentation intact and speech normal  PSYCH: mentation appears normal, affect normal/bright    Diagnostic Test Results:  none     ASSESSMENT/PLAN:     1. Morbid obesity due to excess calories (H)  Continue phentermine. Is losing weight. Plans for bariatric surgery early next year.   - phentermine (ADIPEX-P) 37.5 MG tablet; Take 1 tablet (37.5 mg) by mouth every morning (before breakfast)  Dispense: 30 tablet; Refill: 1    2. Bromhidrosis  Will try twice daily topical antibiotic for odor and see if this is effective. Return to clinic if symptoms persist or fail to improve.  - clindamycin (CLINDAMAX) 1 % lotion; Apply topically 2 times daily  Dispense: 60 mL; Refill: 11    3. Major depressive disorder, single episode, moderate (H)  5. Anxiety  Stable at this time. Declines trying a different medication as she feels she is coping well.    CLAIRE Lundberg [facility]

## 2017-11-15 NOTE — TELEPHONE ENCOUNTER
Patient needs follow up appointment on phentermine. We can talk about deodorant and sweating at the appointment.  SILVIA LoboC

## 2017-11-17 ENCOUNTER — OFFICE VISIT (OUTPATIENT)
Dept: FAMILY MEDICINE | Facility: OTHER | Age: 32
End: 2017-11-17
Payer: COMMERCIAL

## 2017-11-17 VITALS
DIASTOLIC BLOOD PRESSURE: 80 MMHG | TEMPERATURE: 98.3 F | WEIGHT: 238.5 LBS | SYSTOLIC BLOOD PRESSURE: 118 MMHG | RESPIRATION RATE: 16 BRPM | HEART RATE: 84 BPM | BODY MASS INDEX: 39.74 KG/M2 | HEIGHT: 65 IN

## 2017-11-17 DIAGNOSIS — E66.01 MORBID OBESITY DUE TO EXCESS CALORIES (H): Primary | ICD-10-CM

## 2017-11-17 DIAGNOSIS — L75.0 BROMHIDROSIS: ICD-10-CM

## 2017-11-17 DIAGNOSIS — F41.9 ANXIETY: ICD-10-CM

## 2017-11-17 DIAGNOSIS — F32.1 MAJOR DEPRESSIVE DISORDER, SINGLE EPISODE, MODERATE (H): ICD-10-CM

## 2017-11-17 PROCEDURE — 99214 OFFICE O/P EST MOD 30 MIN: CPT | Performed by: STUDENT IN AN ORGANIZED HEALTH CARE EDUCATION/TRAINING PROGRAM

## 2017-11-17 RX ORDER — ETONOGESTREL AND ETHINYL ESTRADIOL VAGINAL RING .015; .12 MG/D; MG/D
RING VAGINAL
Qty: 1 EACH | Refills: 10 | Status: CANCELLED | OUTPATIENT
Start: 2017-11-17

## 2017-11-17 RX ORDER — ERYTHROMYCIN 20 MG/G
GEL TOPICAL DAILY
Status: CANCELLED | OUTPATIENT
Start: 2017-11-17

## 2017-11-17 RX ORDER — CLINDAMYCIN PHOSPHATE 10 UG/ML
LOTION TOPICAL 2 TIMES DAILY
Qty: 60 ML | Refills: 11 | Status: SHIPPED | OUTPATIENT
Start: 2017-11-17 | End: 2018-01-26

## 2017-11-17 RX ORDER — PHENTERMINE HYDROCHLORIDE 37.5 MG/1
37.5 TABLET ORAL
Qty: 30 TABLET | Refills: 1 | Status: SHIPPED | OUTPATIENT
Start: 2017-11-17 | End: 2018-01-26

## 2017-11-17 ASSESSMENT — PATIENT HEALTH QUESTIONNAIRE - PHQ9
SUM OF ALL RESPONSES TO PHQ QUESTIONS 1-9: 7
SUM OF ALL RESPONSES TO PHQ QUESTIONS 1-9: 7
10. IF YOU CHECKED OFF ANY PROBLEMS, HOW DIFFICULT HAVE THESE PROBLEMS MADE IT FOR YOU TO DO YOUR WORK, TAKE CARE OF THINGS AT HOME, OR GET ALONG WITH OTHER PEOPLE: SOMEWHAT DIFFICULT

## 2017-11-17 ASSESSMENT — ANXIETY QUESTIONNAIRES
5. BEING SO RESTLESS THAT IT IS HARD TO SIT STILL: NOT AT ALL
GAD7 TOTAL SCORE: 9
3. WORRYING TOO MUCH ABOUT DIFFERENT THINGS: SEVERAL DAYS
7. FEELING AFRAID AS IF SOMETHING AWFUL MIGHT HAPPEN: NOT AT ALL
4. TROUBLE RELAXING: MORE THAN HALF THE DAYS
GAD7 TOTAL SCORE: 9
1. FEELING NERVOUS, ANXIOUS, OR ON EDGE: MORE THAN HALF THE DAYS
6. BECOMING EASILY ANNOYED OR IRRITABLE: MORE THAN HALF THE DAYS
7. FEELING AFRAID AS IF SOMETHING AWFUL MIGHT HAPPEN: NOT AT ALL
GAD7 TOTAL SCORE: 9
2. NOT BEING ABLE TO STOP OR CONTROL WORRYING: MORE THAN HALF THE DAYS

## 2017-11-17 NOTE — NURSING NOTE
"Chief Complaint   Patient presents with     Sweating and body odor     Panel Management     flu shot, phq, dwight       Initial /80 (BP Location: Right arm, Patient Position: Sitting, Cuff Size: Adult Large)  Pulse 84  Temp 98.3  F (36.8  C) (Temporal)  Resp 16  Ht 5' 5\" (1.651 m)  Wt 238 lb 8 oz (108.2 kg)  BMI 39.69 kg/m2 Estimated body mass index is 39.69 kg/(m^2) as calculated from the following:    Height as of this encounter: 5' 5\" (1.651 m).    Weight as of this encounter: 238 lb 8 oz (108.2 kg).  Medication Reconciliation: complete   Berna Munoz CMA      "

## 2017-11-18 ASSESSMENT — PATIENT HEALTH QUESTIONNAIRE - PHQ9: SUM OF ALL RESPONSES TO PHQ QUESTIONS 1-9: 7

## 2017-11-18 ASSESSMENT — ANXIETY QUESTIONNAIRES: GAD7 TOTAL SCORE: 9

## 2017-11-24 ENCOUNTER — TELEPHONE (OUTPATIENT)
Dept: FAMILY MEDICINE | Facility: OTHER | Age: 32
End: 2017-11-24

## 2017-11-24 DIAGNOSIS — L73.2 HIDRADENITIS SUPPURATIVA: Primary | ICD-10-CM

## 2017-11-24 NOTE — TELEPHONE ENCOUNTER
"Patient stated she is having abdominal pain with nausea when she took an antibiotic. Discussed do not see an oral antibiotic on her medication list. Stated it was prior to EM moving to the [facility] clinic.     After researching the chart found the patient was treated for Hydradenitis suppurativa was prescribed doxycycline (VIBRAMYCIN) 100 MG capsule. Directions were to \"Take 1 capsule (100 mg) by mouth 2 times daily.\"     Due to the abdominal pain she toll 2 pills and stopped the medication. At this time, still has painful bumps in the groin and would like to try an alternative medication.     Advised an OV as this was prescribed 09/2017. Patient asked if the provider could review and advise if able to try a different medication prior to another OV. Pharmacy selected and patient is currently at the pharmacy. Tammie Orta RN, BSN     "

## 2017-11-24 NOTE — TELEPHONE ENCOUNTER
Patent said the antibiotic them Mary Urbano gave her last week upset her stomach. She would like to try something different. She could not remember the name of the medication and she did not want to set up a appt because she was not home. Please call her

## 2017-11-28 RX ORDER — MINOCYCLINE HYDROCHLORIDE 100 MG/1
100 CAPSULE ORAL 2 TIMES DAILY
Qty: 20 CAPSULE | Refills: 0 | Status: SHIPPED | OUTPATIENT
Start: 2017-11-28 | End: 2017-12-08

## 2017-11-28 NOTE — TELEPHONE ENCOUNTER
Please call patient and let her know doxycycline is the first-line treatment and safest antibiotic for hidradenitis. We could try switching her to minocycline which may have less stomach upset as a side effect. I sent the script to [facility] [place]. I also placed a dermatology referral. I would like her to set up an appointment for consult.  Thanks,  Jody Coughlin, CNP

## 2017-11-30 ENCOUNTER — OFFICE VISIT (OUTPATIENT)
Dept: SURGERY | Facility: CLINIC | Age: 32
End: 2017-11-30
Payer: COMMERCIAL

## 2017-11-30 VITALS — WEIGHT: 237 LBS | BODY MASS INDEX: 39.49 KG/M2 | HEIGHT: 65 IN

## 2017-11-30 DIAGNOSIS — E66.01 MORBID OBESITY WITH BMI OF 40.0-44.9, ADULT (H): ICD-10-CM

## 2017-11-30 PROCEDURE — 97803 MED NUTRITION INDIV SUBSEQ: CPT | Performed by: DIETITIAN, REGISTERED

## 2017-11-30 NOTE — PROGRESS NOTES
"PRE SURGICAL WEIGHT LOSS NUTRITION APPOINTMENT    Geneva Coloey  1985  female  [id number]  32 year old    ASSESSMENT    Desired Surgical Procedure: gastric bypass    REASON FOR VISIT:  Geneva Cooley is a 32 year old year old female presents today for a pre-surgical weight loss follow-up appointment. Patient accompanied by self.    DIAGNOSIS:  Weight Status Obesity Grade II BMI 35-39.9    ANTHROPOMETRICS:  Height: 165.1 cm (5' 5\")  Initial Weight:    Weight last visit:    Current weight: 107.5 kg (237 lb)  BMI: 39.52 kg/(m^2).      NUTRITION HISTORY:  Breakfast: oatmeal + banana, sometimes peanut butter toast  Lunch: [employer]way sandwich (turkey and vegetable)  Supper: meatloaf made with ground turkey, tacos   Snacks: kashi bars   Fluids Consumed: water, crystal light   Eating slower: Yes  Chewing foods thoroughly: Yes  Take 20-30 minutes to consume each meal: Yes  Fluids and meals separate by at least 30 minutes: Yes  Carbonation: none  Caffeine: none  Additional Information: patient is most interested in weight loss surgery to \"keep up\" with her 5 year old daughter; in discussing binge eating pt admits she does over eat if she goes too many hours; pt has 2 friends that have had weight loss surgery (one successful the other not successful) 8/30/17-Patient asking about the use of Herbal Life protein drinks; reviewed nutritional information and determined this product is slightly high in calories/ sugar; pt is willing to try other products 9/27: Pt states that her activity has decreased over the last month d/t summer job ending; has compensated by decreasing portions and increasing exercise.  10/27/17 Continues to make positive behavior changes. 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  11/30: Continues with good commitment to bariatric lifestyle. This RD noticed pt restarted phentermine over the last few months; addressed with YOSIH. Relayed need to be off this medication 2 months prior to " surgery to patient; pt verbalizes understanding and will discontinue immediately. Discussed potential for weight gain d/t this; reinforced the importance of portion control and exercise to minimize weight gain. Reviewed post-op diet advancement.     PHYSICAL ACTIVITY:  Type: walking   Frequency: 4 (days per week)  Duration: 30 (minutes)     DIAGNOSIS:  Previous Nutrition Diagnosis: Obesity related to long history of self- monitoring deficit and excessive energy intake evidenced by BMI of 40.7 kg/m2  No change, modified below    Previous goals:   Continue to practice all prebariatric surgery diet guidelines - met  Exercise 3 times per week - met  Avoid liquids with meals - met    Current Nutrition Diagnosis: Obesity related to long history of self-monitoring deficit and excessive energy intake as evidenced by BMI of 39.44 kg/m2.    INTERVENTION:  Nutrition Prescription: Recommended energy/nutrient modification.    GOALS:  Continue to follow all pre-op dietary guidelines  Discontinue phentermine    Follow-Up:   Recommend standard post-op follow up visits to assist with lifestyle changes or per insurance.    Intervention:  - Discussed progress towards previous goals.  - Reinforced importance of making behavior changes in preparation for bariatric surgery.   - Assessed learning needs and learning preferences       NUTRITION MONITORING AND EVALUATION:  Anticipated compliance: good  Patient demonstrated good understanding.   Patient has met pre bariatric surgery diet requirements    Follow up: Continue to monitor patient closely regarding weight loss and diet.  # of visits needed: 0  Cleared by RD: Yes     TIME SPENT WITH PATIENT: 25 minutes    Shilpa Gonzalez RD, LD  Clinical Dietitian

## 2017-11-30 NOTE — MR AVS SNAPSHOT
MRN:[id number]                      After Visit Summary   11/30/2017    Geneva Cooley    MRN: [id number]           Visit Information        Provider Department      11/30/2017 11:00 AM 3, Sh Gerry Prado, RD [facility]      [facility] Information     [facility] gives you secure access to your electronic health record. If you see a primary care provider, you can also send messages to your care team and make appointments. If you have questions, please call your primary care clinic.  If you do not have a primary care provider, please call [phone] and they will assist you.        Care EveryWhere ID     This is your Care EveryWhere ID. This could be used by other organizations to access your [place] medical records  [id number]        Equal Access to Services     RUBEN CYR : Adam Couch, wabrenda santiago, boubacar moctezuma, oscar mata. So [facility] [phone].    ATENCIÓN: Si habla español, tiene a tyler disposición servicios gratuitos de asistencia lingüística. Llame al [phone].    We comply with applicable federal civil rights laws and [place] laws. We do not discriminate on the basis of race, color, national origin, age, disability, sex, sexual orientation, or gender identity.

## 2017-12-11 ENCOUNTER — TELEPHONE (OUTPATIENT)
Dept: FAMILY MEDICINE | Facility: OTHER | Age: 32
End: 2017-12-11

## 2017-12-11 NOTE — TELEPHONE ENCOUNTER
"Reason for call:  Patient reporting a symptom    Symptom or request: bp/ left arm pain    Duration (how long have symptoms been present): yesterday night     Have you been treated for this before? No    Additional comments: pt states wants to speak with nurse about a \"weird\" feeling she had in her left arm. Pt states did take bp at [facility] today and it was 140/90. Pt states wants to know if those bp machines are accurate and also if she should be concerned  Or not . Please advise     Phone Number patient can be reached at:  Home number on file [phone] (home)    Best Time:  ANY    Can we leave a detailed message on this number:  YES    Call taken on 12/11/2017 at 1:08 PM by Samreen Boone    "

## 2017-12-11 NOTE — TELEPHONE ENCOUNTER
Geneva Cooley is a 32 year old female who calls with blood pressure and left arm concerns.    NURSING ASSESSMENT:  Description:  Patient checked her blood pressure at [facility] and sat for about 1 minute before she took the blood pressure. Blood pressure read 140/90.  Was taking Centramine for weight loss. Had left arm pain started last night. strong family history of high blood pressure. Arm pain started when she was laying in bed. Headaches more frequently, occurs when she is wearing her glasses. Stated the headaches are not like her normal headaches, feels like a pressure, dull. Denies lifting heavy objects, nausea, vomiting, blurred vision, shortness of breath, chest pain, shoulder blade pain, weakness, dizziness.   Onset/duration:  Last night for arm pain, blood pressure taken today  Pain scale (0-10)   7-8/10  LMP/preg/breast feeding:  No LMP recorded. Patient is not currently having periods (Reason: Birth Control).  Last exam/Treatment:  11/17/2017  Allergies:   Allergies   Allergen Reactions     Vicodin [Hydrocodone-Acetaminophen] Hives     NURSING PLAN: Nursing advice to patient to follow up in clinic for OV to address new concerns    RECOMMENDED DISPOSITION:  See in 72 hours - for blood pressure concerns   Will comply with recommendation: Yes, stated has an OV with her surgeon tomorrow and based on BP reading will schedule OV with EM  If further questions/concerns or if symptoms do not improve, worsen or new symptoms develop, call your PCP or [facility] as soon as possible.    NOTES:  Disposition was determined by the first positive assessment question, therefore all previous assessment questions were negative    Guideline used:  Telephone Triage Protocols for Nurses, Fifth Edition, Kristin Lowry  Hypertension  Nursing Judgment    Tammie Orta RN, BSN

## 2017-12-12 ENCOUNTER — OFFICE VISIT (OUTPATIENT)
Dept: SURGERY | Facility: CLINIC | Age: 32
End: 2017-12-12
Payer: COMMERCIAL

## 2017-12-12 VITALS
SYSTOLIC BLOOD PRESSURE: 136 MMHG | BODY MASS INDEX: 40.17 KG/M2 | HEART RATE: 81 BPM | WEIGHT: 241.38 LBS | DIASTOLIC BLOOD PRESSURE: 82 MMHG

## 2017-12-12 DIAGNOSIS — E66.01 MORBID OBESITY WITH BMI OF 40.0-44.9, ADULT (H): Primary | ICD-10-CM

## 2017-12-12 PROCEDURE — 99215 OFFICE O/P EST HI 40 MIN: CPT | Performed by: SURGERY

## 2017-12-12 NOTE — MR AVS SNAPSHOT
After Visit Summary   12/12/2017    Geneva Cooley    MRN: [id number]           Patient Information     Date Of Birth          1985        Visit Information        Provider Department      12/12/2017 11:00 AM Yoel Lowry MD [facility]      Today's Diagnoses     Morbid obesity with BMI of 40.0-44.9, adult (H)    -  1       Follow-ups after your visit        Who to contact     If you have questions or need follow up information about today's clinic visit or your schedule please contact [facility] directly at [phone].  Normal or non-critical lab and imaging results will be communicated to you by MyChart, letter or phone within 4 business days after the clinic has received the results. If you do not hear from us within 7 days, please contact the clinic through [employer] or phone. If you have a critical or abnormal lab result, we will notify you by phone as soon as possible.  Submit refill requests through [employer] or call your pharmacy and they will forward the refill request to us. Please allow 3 business days for your refill to be completed.          Additional Information About Your Visit        MyChart Information     [employer] gives you secure access to your electronic health record. If you see a primary care provider, you can also send messages to your care team and make appointments. If you have questions, please call your primary care clinic.  If you do not have a primary care provider, please call [phone] and they will assist you.        Care EveryWhere ID     This is your Care EveryWhere ID. This could be used by other organizations to access your [place] medical records  [id number]        Your Vitals Were     Pulse BMI (Body Mass Index)                81 40.17 kg/m2           Blood Pressure from Last 3 Encounters:   12/12/17 136/82   11/17/17 118/80   09/27/17 116/80     Weight from Last 3 Encounters:   12/12/17 241 lb 6 oz (109.5 kg)   11/30/17 237 lb (107.5 kg)   11/17/17 238 lb 8 oz (108.2 kg)              We Performed the Following     OP ROOMING NOTE TO FROILAN        Primary Care Provider Office Phone # Fax #    CLAIRE Luna [facility] [phone] [address] [facility] [address]        Equal Access to Services     [facility] : Hadii aad ku hadasho Soomaali, waaxda luqadaha, qaybta kaalmada adeegyada, waxay idiin hayaan adeeg kharash la'nancy . So [facility] [phone].    ATENCIÓN: Si habla español, tiene a tyler disposición servicios gratuitos de asistencia lingüística. LlKettering Health [phone].    We comply with applicable federal civil rights laws and [place] laws. We do not discriminate on the basis of race, color, national origin, age, disability, sex, sexual orientation, or gender identity.            Thank you!     Thank you for choosing [facility]  for your care. Our goal is always to provide you with excellent care. Hearing back from our patients is one way we can continue to improve our services. Please take a few minutes to complete the written survey that you may receive in the mail after your visit with us. Thank you!             Your Updated Medication List - Protect others around you: Learn how to safely use, store and throw away your medicines at www.disposemymeds.org.          This list is accurate as of: 12/12/17 12:48 PM.  Always use your most recent med list.                   Brand Name Dispense Instructions for use Diagnosis    clindamycin 1 % lotion    CLINDAMAX    60 mL    Apply topically 2 times daily    Bromhidrosis       cyclobenzaprine 10 MG tablet    FLEXERIL    30 tablet    TAKE ONE TABLET BY MOUTH ONCE DAILY AS NEEDED FOR MUSCLE SPASM    Pain in joint involving ankle and foot, unspecified laterality       etonogestrel-ethinyl estradiol 0.12-0.015 MG/24HR vaginal ring    NUVARING     1 each    Place 1 ring every 21 days then remove for 1 week. Needs appointment for further refills    Encounter for surveillance of contraceptives       meloxicam 15 MG tablet    MOBIC    30 tablet    Take 1 tablet (15 mg) by mouth daily    Chronic bilateral low back pain without sciatica       MOTRIN IB PO      Take 600 mg by mouth        naproxen 500 MG tablet    NAPROSYN    60 tablet    Take 1 tablet (500 mg) by mouth 2 times daily (with meals) As needed    LBP (low back pain)       phentermine 37.5 MG tablet    ADIPEX-P    30 tablet    Take 1 tablet (37.5 mg) by mouth every morning (before breakfast)    Morbid obesity due to excess calories (H)

## 2017-12-12 NOTE — PROGRESS NOTES
Dear Dr. Coughlin, Jody Bowen,     I was asked to see the patient regarding obesity by the referring provider above.    I had the pleasure of meeting with your patient Geneva Cooley recently in our weight loss surgery office.  This patient is a 32 year old year old female who has been undergoing our thorough preoperative screening process in anticipation of potential bariatric surgery.    At initial evaluation we recorded Geneva Cooley's Initial BMI: 41.9 and Initial Weight: 252 lb (114.3 kg).  The patient has been unsuccessful with other methods of permanent weight loss and suffers from multiple weight related medical conditions.  Due to lack of success in achieving weight loss through other methods, she is interested in undergoing bariatric surgery.    PREVIOUS WEIGHT LOSS ATTEMPTS:  Phentermine and diets    CO-MORBIDITIES OF OBESITY INCLUDE:  Back pain, right knee pain, snoring, dyspnea with exertion    VITALS:  /82  Pulse 81  Wt 241 lb 6 oz (109.5 kg)  BMI 40.17 kg/m2    PE:  GENERAL: Alert and oriented x3. NAD  HEENT exam: Sclerae not icteric. Hearing good. Head normocephalic and atraumatic.   CARDIOVASCULAR: No JVD  RESPIRATORY: Breathing unlabored  GASTROINTESTINAL: Obese  LOWER EXTREMITIES: no deformities  MUSCULOSKELETAL: Normal gait, Moves all 4 extremities equal and strong  NEUROLOGIC: no gross defect  SKIN: warm and dry to touch     In summary, she has undergone an appropriate medical evaluation, dietitian evaluation, as well as psychologic screening. The patient appears to be an appropriate candidate for bariatric surgery.    In the office today, I discussed the laparoscopic gastric sleeve surgery.  Risks, benefits and anticipated outcomes were outlined including the risk of death, staple line leak (1-2%), PE, DVT, ulcer, worsening GERD, N/V, stricture, hernia, wound infection, weight regain, and vitamin deficiencies. This patient has a good chance of sustaining permanent weight loss due  to this procedure.  This should also allow improvement if not resolution of his/her weight related medical conditions.    At present we are going to present your patient's file for prior authorization to insurance.  Pending prior authorization, I anticipate a surgical date in the near future.  We will keep you updated on any progress.  If you have questions regarding care please feel free to contact me.  Total time spent in the clinic was 30 minutes with greater than 50% in face-to-face consultation.        Sincerely,    Yoel Lowry    Please route or send letter to:  Primary Care Provider (PCP) and Referring Provider

## 2017-12-18 ENCOUNTER — TELEPHONE (OUTPATIENT)
Dept: FAMILY MEDICINE | Facility: OTHER | Age: 32
End: 2017-12-18

## 2017-12-18 NOTE — LETTER
[facility]  [id number] [place] [id number]  Phone: [phone]        December 18, 2017      Geneva Cooley                                                                                                                                [address]   [facility] [address]            Dear Ms. Cooley,    We are concerned about your health care.  We received a notice that you are to be scheduled with a specialty clinic. The referral has been placed by your provider and you can call to schedule an appointment directly.     Enclosed, you will find the referral with the phone number to call to schedule an appointment.  If you have already scheduled this, you may disregard this letter.    Please call us if you have any questions or concerns.      Thank you,      CLAIRE Lobo CNP

## 2017-12-18 NOTE — TELEPHONE ENCOUNTER
You placed a referral for patient to dermatology  on 11/24/17.  Patient has not scheduled as of yet.      Please review and forward to team if follow up with the patient is needed.     Thank you!  Leila/Clinic Referrals Dyad II

## 2018-01-24 NOTE — PROGRESS NOTES
[facility]  [id number] [place] [address]  [phone]  Dept: [phone]    PRE-OP EVALUATION:  Today's date: 2018    Geneva Cooley (: 1985) presents for pre-operative evaluation assessment as requested by Dr. Lowry.  She requires evaluation and anesthesia risk assessment prior to undergoing surgery/procedure for treatment of gastric sleeve .  Proposed procedure: COMBINED LAPAROSCOPIC GASTRIC SLEEVE, CHOLECYSTECTOMY    Date of Surgery/ Procedure: 18  Time of Surgery/ Procedure: 11:00 am  Hospital/Surgical Facility:  OR  Fax number for surgical facility:   Primary Physician: Jody Coughlin  Type of Anesthesia Anticipated: to be determined    Patient has a Health Care Directive or Living Will:  NO    Preop Questions 2018   1.  Do you have a history of heart attack, stroke, stent, bypass or surgery on an artery in the head, neck, heart or legs? No   2.  Do you ever have any pain or discomfort in your chest? No   3.  Do you have a history of  Heart Failure? No   4.   Are you troubled by shortness of breath when:  walking on a level surface, or up a slight hill, or at night? No   5.  Do you currently have a cold, bronchitis or other respiratory infection? No   6.  Do you have a cough, shortness of breath, or wheezing? YES - mild cough - is on the tail-end of being sick with a cold   7.  Do you sometimes get pains in the calves of your legs when you walk? No   8. Do you or anyone in your family have previous history of blood clots? YES - Mom in leg - had poor circulation in that leg after she was paralyzed from a ruptured brain aneurysm   9.  Do you or does anyone in your family have a serious bleeding problem such as prolonged bleeding following surgeries or cuts? No   10. Have you ever had problems with anemia or been told to take iron pills? YES - took iron when pregnant   11. Have you had any abnormal blood loss such as black, tarry or bloody  stools, or abnormal vaginal bleeding? No   12. Have you ever had a blood transfusion? No   13. Have you or any of your relatives ever had problems with anesthesia? No   14. Do you have sleep apnea, excessive snoring or daytime drowsiness? No   15. Do you have any prosthetic heart valves? No   16. Do you have prosthetic joints? No   17. Is there any chance that you may be pregnant? No         HPI:                                                      Brief HPI related to upcoming procedure: has had difficulty losing weight and is 100 lbs over normal BMI. Plan for gastric sleeve surgery for weight loss      See problem list for active medical problems.  Problems all longstanding and stable, except as noted/documented.  See ROS for pertinent symptoms related to these conditions.                                                                                                  .    MEDICAL HISTORY:                                                      Patient Active Problem List    Diagnosis Date Noted     Morbid obesity with BMI of 40.0-44.9, adult (H) 06/28/2017     Priority: Medium     Chronic bilateral low back pain without sciatica 01/29/2017     Priority: Medium     Family history of breast cancer in mother 01/29/2017     Priority: Medium     Shin splints 01/29/2017     Priority: Medium     Family history of rheumatoid arthritis 01/29/2017     Priority: Medium     Anxiety 04/06/2015     Priority: Medium     Major depressive disorder, single episode, moderate (H) 04/06/2015     Priority: Medium     Insomnia 04/17/2012     Priority: Medium     Knee strain 04/17/2012     Priority: Medium      Past Medical History:   Diagnosis Date     Depression      Shingles outbreak 2012     Past Surgical History:   Procedure Laterality Date     NO HISTORY OF SURGERY       Current Outpatient Prescriptions   Medication Sig Dispense Refill     etonogestrel-ethinyl estradiol (NUVARING) 0.12-0.015 MG/24HR vaginal ring Place 1 ring every 21  "days then remove for 1 week. Needs appointment for further refills 1 each 10     MOTRIN IB PO Take 600 mg by mouth       OTC products: None, except as noted above    Allergies   Allergen Reactions     Vicodin [Hydrocodone-Acetaminophen] Hives      Latex Allergy: NO    Social History   Substance Use Topics     Smoking status: Former Smoker     Smokeless tobacco: Never Used     Alcohol use 0.0 oz/week     0 Standard drinks or equivalent per week      Comment: rarely     History   Drug Use No       REVIEW OF SYSTEMS:                                                    C: NEGATIVE for fever, chills, change in weight  I: NEGATIVE for worrisome rashes, moles or lesions  E: NEGATIVE for vision changes or irritation  E/M: NEGATIVE for ear, mouth and throat problems  R: NEGATIVE for significant cough or SOB  CV: NEGATIVE for chest pain, palpitations or peripheral edema  GI: NEGATIVE for nausea, abdominal pain, heartburn, or change in bowel habits  : NEGATIVE for frequency, dysuria, or hematuria  M: NEGATIVE for significant arthralgias or myalgia  N: NEGATIVE for weakness, dizziness or paresthesias  E: NEGATIVE for temperature intolerance, skin/hair changes  H: NEGATIVE for bleeding problems  P: NEGATIVE for changes in mood or affect    EXAM:                                                    /70  Pulse 78  Temp 97.8  F (36.6  C) (Temporal)  Resp 14  Ht 5' 5.16\" (1.655 m)  Wt 241 lb 9.6 oz (109.6 kg)  BMI 40.01 kg/m2    GENERAL APPEARANCE: healthy, alert and no distress     EYES: EOMI, PERRL     HENT: ear canals and TM's normal and nose and mouth without ulcers or lesions     NECK: no adenopathy, no asymmetry, masses, or scars and thyroid normal to palpation     RESP: lungs clear to auscultation - no rales, rhonchi or wheezes     CV: regular rates and rhythm, normal S1 S2, no S3 or S4 and no murmur, click or rub     ABDOMEN:  soft, nontender, no HSM or masses and bowel sounds normal     MS: extremities normal- no " gross deformities noted, no evidence of inflammation in joints, FROM in all extremities.     SKIN: no suspicious lesions or rashes     NEURO: Normal strength and tone, sensory exam grossly normal, mentation intact and speech normal     PSYCH: mentation appears normal. and affect normal/bright     LYMPHATICS: No cervical, or supraclavicular nodes    DIAGNOSTICS:                                                      EKG: Not indicated due to non-vascular surgery and low risk of event (age <65 and without cardiac risk factors)  Labs Resulted Today:     Results for orders placed or performed in visit on 01/26/18   CBC with platelets   Result Value Ref Range    WBC 7.0 4.0 - 11.0 10e9/L    RBC Count 4.81 3.8 - 5.2 10e12/L    Hemoglobin 13.4 11.7 - 15.7 g/dL    Hematocrit 40.0 35.0 - 47.0 %    MCV 83 78 - 100 fl    MCH 27.9 26.5 - 33.0 pg    MCHC 33.5 31.5 - 36.5 g/dL    RDW 13.8 10.0 - 15.0 %    Platelet Count 300 150 - 450 10e9/L   Basic metabolic panel   Result Value Ref Range    Sodium 137 133 - 144 mmol/L    Potassium 4.3 3.4 - 5.3 mmol/L    Chloride 108 94 - 109 mmol/L    Carbon Dioxide 23 20 - 32 mmol/L    Anion Gap 6 3 - 14 mmol/L    Glucose 81 70 - 99 mg/dL    Urea Nitrogen 14 7 - 30 mg/dL    Creatinine 0.58 0.52 - 1.04 mg/dL    GFR Estimate >90 >60 mL/min/1.7m2    GFR Estimate If Black >90 >60 mL/min/1.7m2    Calcium 8.4 (L) 8.5 - 10.1 mg/dL         Recent Labs   Lab Test  07/03/17   1305  01/16/17   1221  03/21/14   1200   HGB  13.5   --   13.3   PLT  333   --   311   NA  138   --   140   POTASSIUM  3.9   --   3.9   CR  0.63   --   0.61   A1C  5.3  5.5   --         IMPRESSION:                                                    Reason for surgery/procedure: gastric sleeve  Diagnosis/reason for consult: preop    The proposed surgical procedure is considered INTERMEDIATE risk.    REVISED CARDIAC RISK INDEX  The patient has the following serious cardiovascular risks for perioperative complications such as (MI, PE,  "VFib and 3  AV Block):  No serious cardiac risks  INTERPRETATION: 0 risks: Class I (very low risk - 0.4% complication rate)    The patient has the following additional risks for perioperative complications:  No identified additional risks      ICD-10-CM    1. Preop general physical exam Z01.818    2. Morbid obesity with BMI of 40.0-44.9, adult (H) E66.01 Basic metabolic panel    Z68.41    3. History of anemia Z86.2 CBC with platelets   4. Major depressive disorder, single episode, moderate (H) F32.1    5. Anxiety F41.9    6. Chronic bilateral low back pain without sciatica M54.5     G89.29        RECOMMENDATIONS:                                                      --Consult hospital rounder / IM to assist post-op medical management    --Patient will remove Nuvaring today (1/26/18) as hormonal therapy can increase risk for DVT particularly with abdominal surgery that is prolonged. She has stopped all NSAIDs 10 days prior to surgery.     Lab work is essentially normal. No EKG needed as patient has no history of cardiac disease and is generally in good health.     Postoperative pain - patient has allergy to hydrocodone - experienced intense \"internal itching\". Recommend treating post-op pain with an oxycodone product.    APPROVAL GIVEN to proceed with proposed procedure, without further diagnostic evaluation       Signed Electronically by: CLAIRE Lundberg CNP    Copy of this evaluation report is provided to requesting physician.    [facility] Preop Guidelines  "

## 2018-01-24 NOTE — PATIENT INSTRUCTIONS
Remove Nuvaring and replace with new ring after you return home from surgery.  Stop all ibuprofen, Aleve, aspirin 10 days before surgery.  ADAN Lobo    Before Your Surgery      Call your surgeon if there is any change in your health. This includes signs of a cold or flu (such as a sore throat, runny nose, cough, rash or fever).    Do not smoke, drink alcohol or take over the counter medicine (unless your surgeon or primary care doctor tells you to) for the 24 hours before and after surgery.    If you take prescribed drugs: Follow your doctor s orders about which medicines to take and which to stop until after surgery.    Eating and drinking prior to surgery: follow the instructions from your surgeon    Take a shower or bath the night before surgery. Use the soap your surgeon gave you to gently clean your skin. If you do not have soap from your surgeon, use your regular soap. Do not shave or scrub the surgery site.  Wear clean pajamas and have clean sheets on your bed.

## 2018-01-26 ENCOUNTER — OFFICE VISIT (OUTPATIENT)
Dept: FAMILY MEDICINE | Facility: OTHER | Age: 33
End: 2018-01-26
Payer: COMMERCIAL

## 2018-01-26 VITALS
WEIGHT: 241.6 LBS | DIASTOLIC BLOOD PRESSURE: 70 MMHG | SYSTOLIC BLOOD PRESSURE: 112 MMHG | HEIGHT: 65 IN | BODY MASS INDEX: 40.25 KG/M2 | HEART RATE: 78 BPM | RESPIRATION RATE: 14 BRPM | TEMPERATURE: 97.8 F

## 2018-01-26 DIAGNOSIS — M54.50 CHRONIC BILATERAL LOW BACK PAIN WITHOUT SCIATICA: ICD-10-CM

## 2018-01-26 DIAGNOSIS — F41.9 ANXIETY: ICD-10-CM

## 2018-01-26 DIAGNOSIS — G89.29 CHRONIC BILATERAL LOW BACK PAIN WITHOUT SCIATICA: ICD-10-CM

## 2018-01-26 DIAGNOSIS — Z01.818 PREOP GENERAL PHYSICAL EXAM: Primary | ICD-10-CM

## 2018-01-26 DIAGNOSIS — E66.01 MORBID OBESITY WITH BMI OF 40.0-44.9, ADULT (H): ICD-10-CM

## 2018-01-26 DIAGNOSIS — Z86.2 HISTORY OF ANEMIA: ICD-10-CM

## 2018-01-26 DIAGNOSIS — F32.1 MAJOR DEPRESSIVE DISORDER, SINGLE EPISODE, MODERATE (H): ICD-10-CM

## 2018-01-26 LAB
ANION GAP SERPL CALCULATED.3IONS-SCNC: 6 MMOL/L (ref 3–14)
BUN SERPL-MCNC: 14 MG/DL (ref 7–30)
CALCIUM SERPL-MCNC: 8.4 MG/DL (ref 8.5–10.1)
CHLORIDE SERPL-SCNC: 108 MMOL/L (ref 94–109)
CO2 SERPL-SCNC: 23 MMOL/L (ref 20–32)
CREAT SERPL-MCNC: 0.58 MG/DL (ref 0.52–1.04)
ERYTHROCYTE [DISTWIDTH] IN BLOOD BY AUTOMATED COUNT: 13.8 % (ref 10–15)
GFR SERPL CREATININE-BSD FRML MDRD: >90 ML/MIN/1.7M2
GLUCOSE SERPL-MCNC: 81 MG/DL (ref 70–99)
HCT VFR BLD AUTO: 40 % (ref 35–47)
HGB BLD-MCNC: 13.4 G/DL (ref 11.7–15.7)
MCH RBC QN AUTO: 27.9 PG (ref 26.5–33)
MCHC RBC AUTO-ENTMCNC: 33.5 G/DL (ref 31.5–36.5)
MCV RBC AUTO: 83 FL (ref 78–100)
PLATELET # BLD AUTO: 300 10E9/L (ref 150–450)
POTASSIUM SERPL-SCNC: 4.3 MMOL/L (ref 3.4–5.3)
RBC # BLD AUTO: 4.81 10E12/L (ref 3.8–5.2)
SODIUM SERPL-SCNC: 137 MMOL/L (ref 133–144)
WBC # BLD AUTO: 7 10E9/L (ref 4–11)

## 2018-01-26 PROCEDURE — 36415 COLL VENOUS BLD VENIPUNCTURE: CPT | Performed by: STUDENT IN AN ORGANIZED HEALTH CARE EDUCATION/TRAINING PROGRAM

## 2018-01-26 PROCEDURE — 80048 BASIC METABOLIC PNL TOTAL CA: CPT | Performed by: STUDENT IN AN ORGANIZED HEALTH CARE EDUCATION/TRAINING PROGRAM

## 2018-01-26 PROCEDURE — 85027 COMPLETE CBC AUTOMATED: CPT | Performed by: STUDENT IN AN ORGANIZED HEALTH CARE EDUCATION/TRAINING PROGRAM

## 2018-01-26 PROCEDURE — 99214 OFFICE O/P EST MOD 30 MIN: CPT | Performed by: STUDENT IN AN ORGANIZED HEALTH CARE EDUCATION/TRAINING PROGRAM

## 2018-01-26 ASSESSMENT — ANXIETY QUESTIONNAIRES
7. FEELING AFRAID AS IF SOMETHING AWFUL MIGHT HAPPEN: NOT AT ALL
2. NOT BEING ABLE TO STOP OR CONTROL WORRYING: NOT AT ALL
GAD7 TOTAL SCORE: 3
3. WORRYING TOO MUCH ABOUT DIFFERENT THINGS: NOT AT ALL
GAD7 TOTAL SCORE: 3
6. BECOMING EASILY ANNOYED OR IRRITABLE: MORE THAN HALF THE DAYS
5. BEING SO RESTLESS THAT IT IS HARD TO SIT STILL: NOT AT ALL
1. FEELING NERVOUS, ANXIOUS, OR ON EDGE: SEVERAL DAYS
4. TROUBLE RELAXING: NOT AT ALL
7. FEELING AFRAID AS IF SOMETHING AWFUL MIGHT HAPPEN: NOT AT ALL
GAD7 TOTAL SCORE: 3

## 2018-01-26 ASSESSMENT — PAIN SCALES - GENERAL: PAINLEVEL: NO PAIN (0)

## 2018-01-26 ASSESSMENT — PATIENT HEALTH QUESTIONNAIRE - PHQ9
10. IF YOU CHECKED OFF ANY PROBLEMS, HOW DIFFICULT HAVE THESE PROBLEMS MADE IT FOR YOU TO DO YOUR WORK, TAKE CARE OF THINGS AT HOME, OR GET ALONG WITH OTHER PEOPLE: SOMEWHAT DIFFICULT
SUM OF ALL RESPONSES TO PHQ QUESTIONS 1-9: 2
SUM OF ALL RESPONSES TO PHQ QUESTIONS 1-9: 2

## 2018-01-26 NOTE — MR AVS SNAPSHOT
After Visit Summary   1/26/2018    Geneva Cooley    MRN: [id number]           Patient Information     Date Of Birth          1985        Visit Information        Provider Department      1/26/2018 9:40 AM Jody Coughlin APRN [facility]        Today's Diagnoses     Preop general physical exam    -  1    Morbid obesity with BMI of 40.0-44.9, adult (H)        History of anemia          Care Instructions    Remove Nuvaring and replace with new ring after you return home from surgery.  Stop all ibuprofen, Aleve, aspirin 10 days before surgery.  SILVIA LoboC    Before Your Surgery      Call your surgeon if there is any change in your health. This includes signs of a cold or flu (such as a sore throat, runny nose, cough, rash or fever).    Do not smoke, drink alcohol or take over the counter medicine (unless your surgeon or primary care doctor tells you to) for the 24 hours before and after surgery.    If you take prescribed drugs: Follow your doctor s orders about which medicines to take and which to stop until after surgery.    Eating and drinking prior to surgery: follow the instructions from your surgeon    Take a shower or bath the night before surgery. Use the soap your surgeon gave you to gently clean your skin. If you do not have soap from your surgeon, use your regular soap. Do not shave or scrub the surgery site.  Wear clean pajamas and have clean sheets on your bed.           Follow-ups after your visit        Your next 10 appointments already scheduled     Feb 05, 2018   Procedure with Yoel Lowry MD   [facility] (--)    [address] [address]   [phone]            Feb 05, 2018 11:20 AM [facility] OR with Yoel Lowry MD   [facility] ([facility])    [facility] ([facility])    [phone]            Feb 13, 2018  1:30 PM CST   Post Op with Vero Garrett Rn, RN   [facility] [place] ([facility])    [address] [place] [address]  [place] [address]   [phone]            Feb 13, 2018  1:50 PM CST   Post Op with Deloris Rome PA-C   [facility] [place] ([facility])    [address] [place] [address]  [place] [address]   [phone]            Feb 19, 2018  1:00 PM CST   Post Op with Vero Garrett Rn, RN   [facility] [place] ([facility])    [address] [place] [address]  [place] [address]   [phone]            Feb 19, 2018  1:30 PM CST   Post Op with Vero Garrett Diet 1, RD   [facility] [place] ([facility])    [address] [place] [address]  [place] [address]   [phone]            Mar 06, 2018  9:30 AM CST   Post Op with Vero Garrett Rn, RN   [facility] [place] ([facility])    [address] [place] [address]  [place] [address]   [phone]              Who to contact     If you have questions or need follow up information about today's clinic visit or your schedule please contact [facility] directly at [phone].  Normal or non-critical lab and imaging results will be communicated to you by P[employer], letter or phone within 4 business days after the clinic has received the results. If you do not hear from us within 7 days, please contact the clinic through MyChart or phone. If you have a critical or abnormal lab result, we will notify you by phone as soon as possible.  Submit refill requests through [employer] [facility] or call your pharmacy and they will forward the refill request to us. Please allow 3 business days for your refill to be completed.          Additional Information About Your Visit       "  MyChart Information     [employer] gives you secure access to your electronic health record. If you see a primary care provider, you can also send messages to your care team and make appointments. If you have questions, please call your primary care clinic.  If you do not have a primary care provider, please call [phone] and they will assist you.        Care EveryWhere ID     This is your Care EveryWhere ID. This could be used by other organizations to access your [facility] medical records  [id number]        Your Vitals Were     Pulse Temperature Respirations Height BMI (Body Mass Index)       78 97.8  F (36.6  C) (Temporal) 14 5' 5.16\" (1.655 m) 40.01 kg/m2        Blood Pressure from Last 3 Encounters:   01/26/18 112/70   12/12/17 136/82   11/17/17 118/80    Weight from Last 3 Encounters:   01/26/18 241 lb 9.6 oz (109.6 kg)   12/12/17 241 lb 6 oz (109.5 kg)   11/30/17 237 lb (107.5 kg)              We Performed the Following     Basic metabolic panel     CBC with platelets        Primary Care Provider Office Phone # Fax #    Jody Jessi Coughlin, APRN [phone]       [address] [address] [place], [place] [id number]        Equal Access to Services     [facility] Center[facility]ILLIAM : Hadii aad ku hadasho Soomaali, waaxda luqadaha, qaybta kaalmada adeegyada, waxay idiin hayaan adeolayinka khararoberto cárdenas . So [facility] [phone].    ATENCIÓN: Si habla español, tiene a tyler disposición servicios gratuitos de asistencia lingüística. Llame al [phone].    We comply with applicable federal civil rights laws and [place] laws. We do not discriminate on the basis of race, color, national origin, age, disability, sex, sexual orientation, or gender identity.            Thank you!     Thank you for choosing [facility]  for your care. Our goal is always to provide you with excellent care. Hearing back from our patients is one way we can continue to improve our services. Please take a few minutes to complete the " written survey that you may receive in the mail after your visit with us. Thank you!             Your Updated Medication List - Protect others around you: Learn how to safely use, store and throw away your medicines at www.disposemymeds.org.          This list is accurate as of 1/26/18 10:24 AM.  Always use your most recent med list.                   Brand Name Dispense Instructions for use Diagnosis    etonogestrel-ethinyl estradiol 0.12-0.015 MG/24HR vaginal ring    NUVARING    1 each    Place 1 ring every 21 days then remove for 1 week. Needs appointment for further refills    Encounter for surveillance of contraceptives       MOTRIN IB PO      Take 600 mg by mouth

## 2018-01-27 ASSESSMENT — ANXIETY QUESTIONNAIRES: GAD7 TOTAL SCORE: 3

## 2018-01-27 ASSESSMENT — PATIENT HEALTH QUESTIONNAIRE - PHQ9: SUM OF ALL RESPONSES TO PHQ QUESTIONS 1-9: 2

## 2018-02-01 NOTE — H&P (VIEW-ONLY)
[facility]  [address] [place] [id number]  [phone]  Dept: [phone]    PRE-OP EVALUATION:  Today's date: 2018    Geneva Cooley (: 1985) presents for pre-operative evaluation assessment as requested by Dr. Lorwy.  She requires evaluation and anesthesia risk assessment prior to undergoing surgery/procedure for treatment of gastric sleeve .  Proposed procedure: COMBINED LAPAROSCOPIC GASTRIC SLEEVE, CHOLECYSTECTOMY    Date of Surgery/ Procedure: 18  Time of Surgery/ Procedure: 11:00 am  Hospital/Surgical Facility:  OR  Fax number for surgical facility:   Primary Physician: Jody Coughlin  Type of Anesthesia Anticipated: to be determined    Patient has a Health Care Directive or Living Will:  NO    Preop Questions 2018   1.  Do you have a history of heart attack, stroke, stent, bypass or surgery on an artery in the head, neck, heart or legs? No   2.  Do you ever have any pain or discomfort in your chest? No   3.  Do you have a history of  Heart Failure? No   4.   Are you troubled by shortness of breath when:  walking on a level surface, or up a slight hill, or at night? No   5.  Do you currently have a cold, bronchitis or other respiratory infection? No   6.  Do you have a cough, shortness of breath, or wheezing? YES - mild cough - is on the tail-end of being sick with a cold   7.  Do you sometimes get pains in the calves of your legs when you walk? No   8. Do you or anyone in your family have previous history of blood clots? YES - Mom in leg - had poor circulation in that leg after she was paralyzed from a ruptured brain aneurysm   9.  Do you or does anyone in your family have a serious bleeding problem such as prolonged bleeding following surgeries or cuts? No   10. Have you ever had problems with anemia or been told to take iron pills? YES - took iron when pregnant   11. Have you had any abnormal blood loss such as black, tarry or bloody  stools, or abnormal vaginal bleeding? No   12. Have you ever had a blood transfusion? No   13. Have you or any of your relatives ever had problems with anesthesia? No   14. Do you have sleep apnea, excessive snoring or daytime drowsiness? No   15. Do you have any prosthetic heart valves? No   16. Do you have prosthetic joints? No   17. Is there any chance that you may be pregnant? No         HPI:                                                      Brief HPI related to upcoming procedure: has had difficulty losing weight and is 100 lbs over normal BMI. Plan for gastric sleeve surgery for weight loss      See problem list for active medical problems.  Problems all longstanding and stable, except as noted/documented.  See ROS for pertinent symptoms related to these conditions.                                                                                                  .    MEDICAL HISTORY:                                                      Patient Active Problem List    Diagnosis Date Noted     Morbid obesity with BMI of 40.0-44.9, adult (H) 06/28/2017     Priority: Medium     Chronic bilateral low back pain without sciatica 01/29/2017     Priority: Medium     Family history of breast cancer in mother 01/29/2017     Priority: Medium     Shin splints 01/29/2017     Priority: Medium     Family history of rheumatoid arthritis 01/29/2017     Priority: Medium     Anxiety 04/06/2015     Priority: Medium     Major depressive disorder, single episode, moderate (H) 04/06/2015     Priority: Medium     Insomnia 04/17/2012     Priority: Medium     Knee strain 04/17/2012     Priority: Medium      Past Medical History:   Diagnosis Date     Depression      Shingles outbreak 2012     Past Surgical History:   Procedure Laterality Date     NO HISTORY OF SURGERY       Current Outpatient Prescriptions   Medication Sig Dispense Refill     etonogestrel-ethinyl estradiol (NUVARING) 0.12-0.015 MG/24HR vaginal ring Place 1 ring every 21  "days then remove for 1 week. Needs appointment for further refills 1 each 10     MOTRIN IB PO Take 600 mg by mouth       OTC products: None, except as noted above    Allergies   Allergen Reactions     Vicodin [Hydrocodone-Acetaminophen] Hives      Latex Allergy: NO    Social History   Substance Use Topics     Smoking status: Former Smoker     Smokeless tobacco: Never Used     Alcohol use 0.0 oz/week     0 Standard drinks or equivalent per week      Comment: rarely     History   Drug Use No       REVIEW OF SYSTEMS:                                                    C: NEGATIVE for fever, chills, change in weight  I: NEGATIVE for worrisome rashes, moles or lesions  E: NEGATIVE for vision changes or irritation  E/M: NEGATIVE for ear, mouth and throat problems  R: NEGATIVE for significant cough or SOB  CV: NEGATIVE for chest pain, palpitations or peripheral edema  GI: NEGATIVE for nausea, abdominal pain, heartburn, or change in bowel habits  : NEGATIVE for frequency, dysuria, or hematuria  M: NEGATIVE for significant arthralgias or myalgia  N: NEGATIVE for weakness, dizziness or paresthesias  E: NEGATIVE for temperature intolerance, skin/hair changes  H: NEGATIVE for bleeding problems  P: NEGATIVE for changes in mood or affect    EXAM:                                                    /70  Pulse 78  Temp 97.8  F (36.6  C) (Temporal)  Resp 14  Ht 5' 5.16\" (1.655 m)  Wt 241 lb 9.6 oz (109.6 kg)  BMI 40.01 kg/m2    GENERAL APPEARANCE: healthy, alert and no distress     EYES: EOMI, PERRL     HENT: ear canals and TM's normal and nose and mouth without ulcers or lesions     NECK: no adenopathy, no asymmetry, masses, or scars and thyroid normal to palpation     RESP: lungs clear to auscultation - no rales, rhonchi or wheezes     CV: regular rates and rhythm, normal S1 S2, no S3 or S4 and no murmur, click or rub     ABDOMEN:  soft, nontender, no HSM or masses and bowel sounds normal     MS: extremities normal- no " gross deformities noted, no evidence of inflammation in joints, FROM in all extremities.     SKIN: no suspicious lesions or rashes     NEURO: Normal strength and tone, sensory exam grossly normal, mentation intact and speech normal     PSYCH: mentation appears normal. and affect normal/bright     LYMPHATICS: No cervical, or supraclavicular nodes    DIAGNOSTICS:                                                      EKG: Not indicated due to non-vascular surgery and low risk of event (age <65 and without cardiac risk factors)  Labs Resulted Today:     Results for orders placed or performed in visit on 01/26/18   CBC with platelets   Result Value Ref Range    WBC 7.0 4.0 - 11.0 10e9/L    RBC Count 4.81 3.8 - 5.2 10e12/L    Hemoglobin 13.4 11.7 - 15.7 g/dL    Hematocrit 40.0 35.0 - 47.0 %    MCV 83 78 - 100 fl    MCH 27.9 26.5 - 33.0 pg    MCHC 33.5 31.5 - 36.5 g/dL    RDW 13.8 10.0 - 15.0 %    Platelet Count 300 150 - 450 10e9/L   Basic metabolic panel   Result Value Ref Range    Sodium 137 133 - 144 mmol/L    Potassium 4.3 3.4 - 5.3 mmol/L    Chloride 108 94 - 109 mmol/L    Carbon Dioxide 23 20 - 32 mmol/L    Anion Gap 6 3 - 14 mmol/L    Glucose 81 70 - 99 mg/dL    Urea Nitrogen 14 7 - 30 mg/dL    Creatinine 0.58 0.52 - 1.04 mg/dL    GFR Estimate >90 >60 mL/min/1.7m2    GFR Estimate If Black >90 >60 mL/min/1.7m2    Calcium 8.4 (L) 8.5 - 10.1 mg/dL         Recent Labs   Lab Test  07/03/17   1305  01/16/17   1221  03/21/14   1200   HGB  13.5   --   13.3   PLT  333   --   311   NA  138   --   140   POTASSIUM  3.9   --   3.9   CR  0.63   --   0.61   A1C  5.3  5.5   --         IMPRESSION:                                                    Reason for surgery/procedure: gastric sleeve  Diagnosis/reason for consult: preop    The proposed surgical procedure is considered INTERMEDIATE risk.    REVISED CARDIAC RISK INDEX  The patient has the following serious cardiovascular risks for perioperative complications such as (MI, PE,  "VFib and 3  AV Block):  No serious cardiac risks  INTERPRETATION: 0 risks: Class I (very low risk - 0.4% complication rate)    The patient has the following additional risks for perioperative complications:  No identified additional risks      ICD-10-CM    1. Preop general physical exam Z01.818    2. Morbid obesity with BMI of 40.0-44.9, adult (H) E66.01 Basic metabolic panel    Z68.41    3. History of anemia Z86.2 CBC with platelets   4. Major depressive disorder, single episode, moderate (H) F32.1    5. Anxiety F41.9    6. Chronic bilateral low back pain without sciatica M54.5     G89.29        RECOMMENDATIONS:                                                      --Consult hospital rounder / IM to assist post-op medical management    --Patient will remove Nuvaring today (1/26/18) as hormonal therapy can increase risk for DVT particularly with abdominal surgery that is prolonged. She has stopped all NSAIDs 10 days prior to surgery.     Lab work is essentially normal. No EKG needed as patient has no history of cardiac disease and is generally in good health.     Postoperative pain - patient has allergy to hydrocodone - experienced intense \"internal itching\". Recommend treating post-op pain with an oxycodone product.    APPROVAL GIVEN to proceed with proposed procedure, without further diagnostic evaluation       Signed Electronically by: CLAIRE Lundberg CNP    Copy of this evaluation report is provided to requesting physician.    [facility] Preop Guidelines  "

## 2018-02-05 ENCOUNTER — ANESTHESIA (OUTPATIENT)
Dept: SURGERY | Facility: CLINIC | Age: 33
End: 2018-02-05
Payer: COMMERCIAL

## 2018-02-05 ENCOUNTER — HOSPITAL ENCOUNTER (INPATIENT)
Facility: CLINIC | Age: 33
LOS: 2 days | Discharge: HOME | End: 2018-02-07
Attending: SURGERY | Admitting: SURGERY
Payer: COMMERCIAL

## 2018-02-05 ENCOUNTER — ANESTHESIA EVENT (OUTPATIENT)
Dept: SURGERY | Facility: CLINIC | Age: 33
End: 2018-02-05
Payer: COMMERCIAL

## 2018-02-05 ENCOUNTER — APPOINTMENT (OUTPATIENT)
Dept: SURGERY | Facility: PHYSICIAN GROUP | Age: 33
End: 2018-02-05
Payer: COMMERCIAL

## 2018-02-05 DIAGNOSIS — E66.01 MORBID OBESITY WITH BMI OF 40.0-44.9, ADULT (H): Primary | ICD-10-CM

## 2018-02-05 LAB
CREAT SERPL-MCNC: 0.49 MG/DL (ref 0.52–1.04)
GFR SERPL CREATININE-BSD FRML MDRD: >90 ML/MIN/1.7M2
HCG UR QL: NEGATIVE
PLATELET # BLD AUTO: 296 10E9/L (ref 150–450)

## 2018-02-05 PROCEDURE — 43775 LAP SLEEVE GASTRECTOMY: CPT | Performed by: SURGERY

## 2018-02-05 PROCEDURE — 40000170 ZZH STATISTIC PRE-PROCEDURE ASSESSMENT II: Performed by: SURGERY

## 2018-02-05 PROCEDURE — 88300 SURGICAL PATH GROSS: CPT | Mod: 26 | Performed by: SURGERY

## 2018-02-05 PROCEDURE — 25000125 ZZHC RX 250: Performed by: ANESTHESIOLOGY

## 2018-02-05 PROCEDURE — 37000009 ZZH ANESTHESIA TECHNICAL FEE, EACH ADDTL 15 MIN: Performed by: SURGERY

## 2018-02-05 PROCEDURE — 88300 SURGICAL PATH GROSS: CPT | Performed by: SURGERY

## 2018-02-05 PROCEDURE — 25000128 H RX IP 250 OP 636: Performed by: ANESTHESIOLOGY

## 2018-02-05 PROCEDURE — 36415 COLL VENOUS BLD VENIPUNCTURE: CPT | Performed by: PHYSICIAN ASSISTANT

## 2018-02-05 PROCEDURE — 71000012 ZZH RECOVERY PHASE 1 LEVEL 1 FIRST HR: Performed by: SURGERY

## 2018-02-05 PROCEDURE — 71000013 ZZH RECOVERY PHASE 1 LEVEL 1 EA ADDTL HR: Performed by: SURGERY

## 2018-02-05 PROCEDURE — 27210794 ZZH OR GENERAL SUPPLY STERILE: Performed by: SURGERY

## 2018-02-05 PROCEDURE — 43775 LAP SLEEVE GASTRECTOMY: CPT | Mod: AS | Performed by: PHYSICIAN ASSISTANT

## 2018-02-05 PROCEDURE — 37000008 ZZH ANESTHESIA TECHNICAL FEE, 1ST 30 MIN: Performed by: SURGERY

## 2018-02-05 PROCEDURE — 27210995 ZZH RX 272: Performed by: SURGERY

## 2018-02-05 PROCEDURE — 25000566 ZZH SEVOFLURANE, EA 15 MIN: Performed by: SURGERY

## 2018-02-05 PROCEDURE — 25000128 H RX IP 250 OP 636: Performed by: SURGERY

## 2018-02-05 PROCEDURE — 81025 URINE PREGNANCY TEST: CPT | Performed by: ANESTHESIOLOGY

## 2018-02-05 PROCEDURE — 25000128 H RX IP 250 OP 636: Performed by: REGISTERED NURSE

## 2018-02-05 PROCEDURE — 36000093 ZZH SURGERY LEVEL 4 1ST 30 MIN: Performed by: SURGERY

## 2018-02-05 PROCEDURE — 25800025 ZZH RX 258: Performed by: SURGERY

## 2018-02-05 PROCEDURE — 12000007 ZZH R&B INTERMEDIATE

## 2018-02-05 PROCEDURE — 0DB64Z3 EXCISION OF STOMACH, PERCUTANEOUS ENDOSCOPIC APPROACH, VERTICAL: ICD-10-PCS | Performed by: SURGERY

## 2018-02-05 PROCEDURE — 25000125 ZZHC RX 250: Performed by: REGISTERED NURSE

## 2018-02-05 PROCEDURE — 85049 AUTOMATED PLATELET COUNT: CPT | Performed by: PHYSICIAN ASSISTANT

## 2018-02-05 PROCEDURE — 36000063 ZZH SURGERY LEVEL 4 EA 15 ADDTL MIN: Performed by: SURGERY

## 2018-02-05 PROCEDURE — 82565 ASSAY OF CREATININE: CPT | Performed by: PHYSICIAN ASSISTANT

## 2018-02-05 PROCEDURE — 25000128 H RX IP 250 OP 636: Performed by: PHYSICIAN ASSISTANT

## 2018-02-05 RX ORDER — HEPARIN SODIUM 5000 [USP'U]/.5ML
5000 INJECTION, SOLUTION INTRAVENOUS; SUBCUTANEOUS
Status: COMPLETED | OUTPATIENT
Start: 2018-02-05 | End: 2018-02-05

## 2018-02-05 RX ORDER — MAGNESIUM HYDROXIDE 1200 MG/15ML
LIQUID ORAL PRN
Status: DISCONTINUED | OUTPATIENT
Start: 2018-02-05 | End: 2018-02-05 | Stop reason: HOSPADM

## 2018-02-05 RX ORDER — ONDANSETRON 2 MG/ML
4 INJECTION INTRAMUSCULAR; INTRAVENOUS EVERY 30 MIN PRN
Status: DISCONTINUED | OUTPATIENT
Start: 2018-02-05 | End: 2018-02-05 | Stop reason: HOSPADM

## 2018-02-05 RX ORDER — ONDANSETRON 4 MG/1
4 TABLET, ORALLY DISINTEGRATING ORAL EVERY 30 MIN PRN
Status: DISCONTINUED | OUTPATIENT
Start: 2018-02-05 | End: 2018-02-05 | Stop reason: HOSPADM

## 2018-02-05 RX ORDER — GLYCOPYRROLATE 0.2 MG/ML
INJECTION, SOLUTION INTRAMUSCULAR; INTRAVENOUS PRN
Status: DISCONTINUED | OUTPATIENT
Start: 2018-02-05 | End: 2018-02-05

## 2018-02-05 RX ORDER — NALOXONE HYDROCHLORIDE 0.4 MG/ML
.1-.4 INJECTION, SOLUTION INTRAMUSCULAR; INTRAVENOUS; SUBCUTANEOUS
Status: DISCONTINUED | OUTPATIENT
Start: 2018-02-05 | End: 2018-02-07 | Stop reason: HOSPADM

## 2018-02-05 RX ORDER — NEOSTIGMINE METHYLSULFATE 1 MG/ML
VIAL (ML) INJECTION PRN
Status: DISCONTINUED | OUTPATIENT
Start: 2018-02-05 | End: 2018-02-05

## 2018-02-05 RX ORDER — DEXAMETHASONE SODIUM PHOSPHATE 4 MG/ML
INJECTION, SOLUTION INTRA-ARTICULAR; INTRALESIONAL; INTRAMUSCULAR; INTRAVENOUS; SOFT TISSUE PRN
Status: DISCONTINUED | OUTPATIENT
Start: 2018-02-05 | End: 2018-02-05

## 2018-02-05 RX ORDER — ONDANSETRON 4 MG/1
4 TABLET, ORALLY DISINTEGRATING ORAL EVERY 6 HOURS PRN
Status: DISCONTINUED | OUTPATIENT
Start: 2018-02-05 | End: 2018-02-07 | Stop reason: HOSPADM

## 2018-02-05 RX ORDER — FENTANYL CITRATE 50 UG/ML
25-50 INJECTION, SOLUTION INTRAMUSCULAR; INTRAVENOUS
Status: DISCONTINUED | OUTPATIENT
Start: 2018-02-05 | End: 2018-02-05 | Stop reason: HOSPADM

## 2018-02-05 RX ORDER — DIPHENHYDRAMINE HCL 25 MG
25 CAPSULE ORAL EVERY 6 HOURS PRN
Status: DISCONTINUED | OUTPATIENT
Start: 2018-02-05 | End: 2018-02-07 | Stop reason: HOSPADM

## 2018-02-05 RX ORDER — ETONOGESTREL AND ETHINYL ESTRADIOL VAGINAL RING .015; .12 MG/D; MG/D
1 RING VAGINAL
COMMUNITY
End: 2018-06-25

## 2018-02-05 RX ORDER — DIPHENHYDRAMINE HYDROCHLORIDE 50 MG/ML
25 INJECTION INTRAMUSCULAR; INTRAVENOUS EVERY 6 HOURS PRN
Status: DISCONTINUED | OUTPATIENT
Start: 2018-02-05 | End: 2018-02-07 | Stop reason: HOSPADM

## 2018-02-05 RX ORDER — ONDANSETRON 2 MG/ML
4 INJECTION INTRAMUSCULAR; INTRAVENOUS EVERY 6 HOURS PRN
Status: DISCONTINUED | OUTPATIENT
Start: 2018-02-05 | End: 2018-02-07 | Stop reason: HOSPADM

## 2018-02-05 RX ORDER — ONDANSETRON 2 MG/ML
INJECTION INTRAMUSCULAR; INTRAVENOUS PRN
Status: DISCONTINUED | OUTPATIENT
Start: 2018-02-05 | End: 2018-02-05

## 2018-02-05 RX ORDER — PROCHLORPERAZINE MALEATE 5 MG
10 TABLET ORAL EVERY 6 HOURS PRN
Status: DISCONTINUED | OUTPATIENT
Start: 2018-02-05 | End: 2018-02-07 | Stop reason: HOSPADM

## 2018-02-05 RX ORDER — PROPOFOL 10 MG/ML
INJECTION, EMULSION INTRAVENOUS PRN
Status: DISCONTINUED | OUTPATIENT
Start: 2018-02-05 | End: 2018-02-05

## 2018-02-05 RX ORDER — PROPOFOL 10 MG/ML
INJECTION, EMULSION INTRAVENOUS CONTINUOUS PRN
Status: DISCONTINUED | OUTPATIENT
Start: 2018-02-05 | End: 2018-02-05

## 2018-02-05 RX ORDER — LIDOCAINE HYDROCHLORIDE 20 MG/ML
INJECTION, SOLUTION INFILTRATION; PERINEURAL PRN
Status: DISCONTINUED | OUTPATIENT
Start: 2018-02-05 | End: 2018-02-05

## 2018-02-05 RX ORDER — SODIUM CHLORIDE, SODIUM LACTATE, POTASSIUM CHLORIDE, CALCIUM CHLORIDE 600; 310; 30; 20 MG/100ML; MG/100ML; MG/100ML; MG/100ML
INJECTION, SOLUTION INTRAVENOUS CONTINUOUS
Status: DISCONTINUED | OUTPATIENT
Start: 2018-02-05 | End: 2018-02-05 | Stop reason: HOSPADM

## 2018-02-05 RX ORDER — FENTANYL CITRATE 50 UG/ML
INJECTION, SOLUTION INTRAMUSCULAR; INTRAVENOUS PRN
Status: DISCONTINUED | OUTPATIENT
Start: 2018-02-05 | End: 2018-02-05

## 2018-02-05 RX ORDER — HYDROMORPHONE HYDROCHLORIDE 1 MG/ML
.3-.5 INJECTION, SOLUTION INTRAMUSCULAR; INTRAVENOUS; SUBCUTANEOUS EVERY 5 MIN PRN
Status: DISCONTINUED | OUTPATIENT
Start: 2018-02-05 | End: 2018-02-05 | Stop reason: HOSPADM

## 2018-02-05 RX ORDER — VECURONIUM BROMIDE 1 MG/ML
INJECTION, POWDER, LYOPHILIZED, FOR SOLUTION INTRAVENOUS PRN
Status: DISCONTINUED | OUTPATIENT
Start: 2018-02-05 | End: 2018-02-05

## 2018-02-05 RX ORDER — SODIUM CHLORIDE, SODIUM LACTATE, POTASSIUM CHLORIDE, CALCIUM CHLORIDE 600; 310; 30; 20 MG/100ML; MG/100ML; MG/100ML; MG/100ML
INJECTION, SOLUTION INTRAVENOUS CONTINUOUS
Status: DISCONTINUED | OUTPATIENT
Start: 2018-02-05 | End: 2018-02-07 | Stop reason: HOSPADM

## 2018-02-05 RX ORDER — KETOROLAC TROMETHAMINE 30 MG/ML
30 INJECTION, SOLUTION INTRAMUSCULAR; INTRAVENOUS EVERY 6 HOURS
Status: COMPLETED | OUTPATIENT
Start: 2018-02-05 | End: 2018-02-06

## 2018-02-05 RX ORDER — NALOXONE HYDROCHLORIDE 0.4 MG/ML
.1-.4 INJECTION, SOLUTION INTRAMUSCULAR; INTRAVENOUS; SUBCUTANEOUS
Status: DISCONTINUED | OUTPATIENT
Start: 2018-02-05 | End: 2018-02-05

## 2018-02-05 RX ORDER — LIDOCAINE 40 MG/G
CREAM TOPICAL
Status: DISCONTINUED | OUTPATIENT
Start: 2018-02-05 | End: 2018-02-07 | Stop reason: HOSPADM

## 2018-02-05 RX ADMIN — VECURONIUM BROMIDE 2 MG: 1 INJECTION, POWDER, LYOPHILIZED, FOR SOLUTION INTRAVENOUS at 11:16

## 2018-02-05 RX ADMIN — CEFAZOLIN SODIUM 2 G: 2 SOLUTION INTRAVENOUS at 17:00

## 2018-02-05 RX ADMIN — HYDROMORPHONE HYDROCHLORIDE 0.5 MG: 1 INJECTION, SOLUTION INTRAMUSCULAR; INTRAVENOUS; SUBCUTANEOUS at 13:14

## 2018-02-05 RX ADMIN — FENTANYL CITRATE 100 MCG: 50 INJECTION, SOLUTION INTRAMUSCULAR; INTRAVENOUS at 10:41

## 2018-02-05 RX ADMIN — NEOSTIGMINE METHYLSULFATE 5 MG: 1 INJECTION INTRAMUSCULAR; INTRAVENOUS; SUBCUTANEOUS at 12:13

## 2018-02-05 RX ADMIN — HYDROMORPHONE HYDROCHLORIDE: 10 INJECTION, SOLUTION INTRAMUSCULAR; INTRAVENOUS; SUBCUTANEOUS at 13:04

## 2018-02-05 RX ADMIN — PROPOFOL: 10 INJECTION, EMULSION INTRAVENOUS at 11:45

## 2018-02-05 RX ADMIN — KETOROLAC TROMETHAMINE 30 MG: 30 INJECTION, SOLUTION INTRAMUSCULAR at 18:31

## 2018-02-05 RX ADMIN — GLYCOPYRROLATE 0.8 MG: 0.2 INJECTION, SOLUTION INTRAMUSCULAR; INTRAVENOUS at 12:13

## 2018-02-05 RX ADMIN — PROPOFOL 200 MG: 10 INJECTION, EMULSION INTRAVENOUS at 10:41

## 2018-02-05 RX ADMIN — LIDOCAINE HYDROCHLORIDE 100 MG: 20 INJECTION, SOLUTION INFILTRATION; PERINEURAL at 10:41

## 2018-02-05 RX ADMIN — HYDROMORPHONE HYDROCHLORIDE: 10 INJECTION, SOLUTION INTRAMUSCULAR; INTRAVENOUS; SUBCUTANEOUS at 22:13

## 2018-02-05 RX ADMIN — HEPARIN SODIUM 5000 UNITS: 10000 INJECTION, SOLUTION INTRAVENOUS; SUBCUTANEOUS at 10:47

## 2018-02-05 RX ADMIN — PROPOFOL 50 MCG/KG/MIN: 10 INJECTION, EMULSION INTRAVENOUS at 10:41

## 2018-02-05 RX ADMIN — HYDROMORPHONE HYDROCHLORIDE 0.5 MG: 1 INJECTION, SOLUTION INTRAMUSCULAR; INTRAVENOUS; SUBCUTANEOUS at 14:09

## 2018-02-05 RX ADMIN — SODIUM CHLORIDE, POTASSIUM CHLORIDE, SODIUM LACTATE AND CALCIUM CHLORIDE: 600; 310; 30; 20 INJECTION, SOLUTION INTRAVENOUS at 12:14

## 2018-02-05 RX ADMIN — FENTANYL CITRATE 50 MCG: 50 INJECTION, SOLUTION INTRAMUSCULAR; INTRAVENOUS at 10:37

## 2018-02-05 RX ADMIN — LIDOCAINE HYDROCHLORIDE 1 ML: 10 INJECTION, SOLUTION EPIDURAL; INFILTRATION; INTRACAUDAL; PERINEURAL at 10:00

## 2018-02-05 RX ADMIN — ONDANSETRON 4 MG: 2 INJECTION INTRAMUSCULAR; INTRAVENOUS at 12:59

## 2018-02-05 RX ADMIN — ONDANSETRON 4 MG: 2 INJECTION INTRAMUSCULAR; INTRAVENOUS at 12:00

## 2018-02-05 RX ADMIN — ROCURONIUM BROMIDE 50 MG: 10 INJECTION INTRAVENOUS at 10:41

## 2018-02-05 RX ADMIN — SODIUM CHLORIDE, POTASSIUM CHLORIDE, SODIUM LACTATE AND CALCIUM CHLORIDE: 600; 310; 30; 20 INJECTION, SOLUTION INTRAVENOUS at 11:00

## 2018-02-05 RX ADMIN — ONDANSETRON 4 MG: 2 INJECTION INTRAMUSCULAR; INTRAVENOUS at 21:43

## 2018-02-05 RX ADMIN — SODIUM CHLORIDE, POTASSIUM CHLORIDE, SODIUM LACTATE AND CALCIUM CHLORIDE: 600; 310; 30; 20 INJECTION, SOLUTION INTRAVENOUS at 21:43

## 2018-02-05 RX ADMIN — SODIUM CHLORIDE, POTASSIUM CHLORIDE, SODIUM LACTATE AND CALCIUM CHLORIDE: 600; 310; 30; 20 INJECTION, SOLUTION INTRAVENOUS at 09:59

## 2018-02-05 RX ADMIN — DEXAMETHASONE SODIUM PHOSPHATE 4 MG: 4 INJECTION, SOLUTION INTRA-ARTICULAR; INTRALESIONAL; INTRAMUSCULAR; INTRAVENOUS; SOFT TISSUE at 10:45

## 2018-02-05 RX ADMIN — FENTANYL CITRATE 50 MCG: 50 INJECTION, SOLUTION INTRAMUSCULAR; INTRAVENOUS at 10:33

## 2018-02-05 RX ADMIN — DEXMEDETOMIDINE HYDROCHLORIDE 12 MCG: 100 INJECTION, SOLUTION INTRAVENOUS at 11:57

## 2018-02-05 RX ADMIN — CEFAZOLIN SODIUM 2 G: 2 SOLUTION INTRAVENOUS at 10:45

## 2018-02-05 RX ADMIN — MIDAZOLAM 2 MG: 1 INJECTION INTRAMUSCULAR; INTRAVENOUS at 10:31

## 2018-02-05 RX ADMIN — DEXMEDETOMIDINE HYDROCHLORIDE 20 MCG: 100 INJECTION, SOLUTION INTRAVENOUS at 10:47

## 2018-02-05 ASSESSMENT — COPD QUESTIONNAIRES: COPD: 0

## 2018-02-05 NOTE — ANESTHESIA PREPROCEDURE EVALUATION
Procedure: Procedure(s):  COMBINED LAPAROSCOPIC GASTRIC SLEEVE, CHOLECYSTECTOMY  Preop diagnosis: MORBID OBESITY     Allergies   Allergen Reactions     Vicodin [Hydrocodone-Acetaminophen] Hives     Past Medical History:   Diagnosis Date     Depression      Shingles outbreak 2012     Past Surgical History:   Procedure Laterality Date     NO HISTORY OF SURGERY       Social History   Substance Use Topics     Smoking status: Never Smoker     Smokeless tobacco: Never Used      Comment: smoked at age 17      Alcohol use 0.0 oz/week     0 Standard drinks or equivalent per week      Comment: rarely     Prior to Admission medications    Medication Sig Start Date End Date Taking? Authorizing Provider   Calcium-Vitamin D-Vitamin K (VIACTIV PO) Take 1 chew tab by mouth daily as needed   Yes Reported, Patient   etonogestrel-ethinyl estradiol (NUVARING) 0.12-0.015 MG/24HR vaginal ring Place 1 each vaginally every 21 days Place 1 ring every 21 days then remove for 1 week. Needs appointment for further refills   Yes Reported, Patient   MOTRIN IB PO Take 200-600 mg by mouth 3 times daily as needed    Yes Reported, Patient     Current Facility-Administered Medications Ordered in Epic   Medication Dose Route Frequency Last Rate Last Dose     heparin sodium PF injection 5,000 Units  5,000 Units Subcutaneous Pre-Op/Pre-procedure x 1 dose         ceFAZolin (ANCEF) intermittent infusion 2 g in 50 mL dextrose PREMIX  2 g Intravenous Pre-Op/Pre-procedure x 1 dose         lidocaine 1 % 1 mL  1 mL Other Q1H PRN   1 mL at 02/05/18 1000     sodium chloride (PF) 0.9% PF flush 3 mL  3 mL Intracatheter Q8H         lactated ringers infusion   Intravenous Continuous 25 mL/hr at 02/05/18 0959       No current [facility]-ordered outpatient prescriptions on file.       lactated ringers 25 mL/hr at 02/05/18 0959     Wt Readings from Last 1 Encounters:   02/05/18 108.6 kg (239 lb 6.7 oz)     Temp Readings from Last 1 Encounters:   02/05/18 36.2  C (97.2  F)  (Temporal)     BP Readings from Last 6 Encounters:   02/05/18 128/75   01/26/18 112/70   12/12/17 136/82   11/17/17 118/80   09/27/17 116/80   08/28/17 110/78     Pulse Readings from Last 4 Encounters:   02/05/18 76   01/26/18 78   12/12/17 81   11/17/17 84     Resp Readings from Last 1 Encounters:   02/05/18 16     SpO2 Readings from Last 1 Encounters:   02/05/18 97%     Recent Labs   Lab Test  01/26/18   1029  07/03/17   1305   NA  137  138   POTASSIUM  4.3  3.9   CHLORIDE  108  107   CO2  23  24   ANIONGAP  6  7   GLC  81  170*   BUN  14  10   CR  0.58  0.63   PAYAM  8.4*  8.3*     Recent Labs   Lab Test  07/03/17   1305   AST  16   ALT  24   ALKPHOS  93   BILITOTAL  0.2     Recent Labs   Lab Test  01/26/18   1029  07/03/17   1305   WBC  7.0  9.4   HGB  13.4  13.5   PLT  300  333          Anesthesia Evaluation     .             ROS/MED HX    ENT/Pulmonary:      (-) asthma, COPD and sleep apnea   Neurologic:      (-) CVA and TIA   Cardiovascular:  - neg cardiovascular ROS       METS/Exercise Tolerance:     Hematologic:         Musculoskeletal:         GI/Hepatic:        (-) GERD   Renal/Genitourinary:         Endo:     (+) Obesity, .      Psychiatric:     (+) psychiatric history anxiety and depression      Infectious Disease:         Malignancy:         Other:                     Physical Exam      Airway   Mallampati: II  TM distance: >3 FB  Neck ROM: full    Dental     Cardiovascular   Rhythm and rate: regular      Pulmonary    breath sounds clear to auscultation                    Anesthesia Plan      History & Physical Review  History and physical reviewed and following examination; no interval change.    ASA Status:  2 .        Plan for General and ETT   PONV prophylaxis:  Ondansetron (or other 5HT-3) and Dexamethasone or Solumedrol  Additional equipment: Videolaryngoscope Propofol gtt       Postoperative Care      Consents  Anesthetic plan, risks, benefits and alternatives discussed with:  Patient..                           .

## 2018-02-05 NOTE — IP AVS SNAPSHOT
[facility] [address] [facility]    [address] [place] [address]    Phone:  [phone]                                       After Visit Summary   2/5/2018    Geneva Cooley    MRN: [id number]           After Visit Summary Signature Page     I have received my discharge instructions, and my questions have been answered. I have discussed any challenges I see with this plan with the nurse or doctor.    ..........................................................................................................................................  Patient/Patient Representative Signature      ..........................................................................................................................................  Patient Representative Print Name and Relationship to Patient    ..................................................               ................................................  Date                                            Time    ..........................................................................................................................................  Reviewed by Signature/Title    ...................................................              ..............................................  Date                                                            Time

## 2018-02-05 NOTE — PROGRESS NOTES
Admission medication history interview status for the 2/5/2018  admission is complete. See EPIC admission navigator for prior to admission medications     Medication history source reliability:Good    Medication history interview source(s):Patient    Medication history resources (including written lists, pill bottles, clinic record):None    Primary pharmacy.[facility]    Additional medication history information not noted on PTA med list :None    Time spent in this activity: 45 minutes    Prior to Admission medications    Medication Sig Last Dose Taking? Auth Provider   Calcium-Vitamin D-Vitamin K (VIACTIV PO) Take 1 chew tab by mouth daily as needed more than a week at prn Yes Reported, Patient   etonogestrel-ethinyl estradiol (NUVARING) 0.12-0.015 MG/24HR vaginal ring Place 1 each vaginally every 21 days Place 1 ring every 21 days then remove for 1 week. Needs appointment for further refills OUT- removed a week ago Yes Reported, Patient   MOTRIN IB PO Take 200-600 mg by mouth 3 times daily as needed  more than a week at prn Yes Reported, Patient

## 2018-02-05 NOTE — IP AVS SNAPSHOT
MRN:[id number]                      After Visit Summary   2/5/2018    Geneva Cooley    MRN: [id number]           Thank you!     Thank you for choosing [facility] for your care. Our goal is always to provide you with excellent care. Hearing back from our patients is one way we can continue to improve our services. Please take a few minutes to complete the written survey that you may receive in the mail after you visit with us. Thank you!        Patient Information     Date Of Birth          1985        Designated Caregiver       Most Recent Value    Caregiver    Will someone help with your care after discharge? yes    Name of designated caregiver Emre burt    Phone number of caregiver [phone]    Caregiver address [address] [place] [address]      About your hospital stay     You were admitted on:  February 5, 2018 You last received care in the:  [facility] [address] [facility]    You were discharged on:  February 7, 2018        Reason for your hospital stay       Gastric sleeve                  Who to Call     For medical emergencies, please call 911.  For non-urgent questions about your medical care, please call your primary care provider or clinic, [phone]  For questions related to your surgery, please call your surgery clinic        Attending Provider     Provider Yoel Fleming MD Surgery       Primary Care Provider Office Phone # Fax #    Jody CLAIRE Champagne [facility] [phone]      After Care Instructions     Activity       Your activity upon discharge: activity as tolerated. No heavy lifting > 20 lbs or strenuous exercise x 3-4 weeks. No driving or alcohol while on pain meds.            Diet       Follow this diet upon discharge: bariatric full liquid diet            Wound care and dressings       Instructions to care for your wound at home: keep wound(s) clean and dry. You may shower, but do not soak incisions x 2  weeks. Leave steri strips in place, they will fall off on their own. Apply dry dressing as needed.                  Follow-up Appointments     Follow-up and recommended labs and tests        [facility] as scheduled.  Please call the clinic if you have any questions or concerns.  Follow up with your PCP in 4-6 weeks.  Continue to ambulate and use your incentive spirometer at home.  Do not take NSAIDs (such as ibuprofen, naproxen, or aspirin).  You may hold your vitamins/supplements if you are having nausea, try to resume your chewable vitamin if you are able to.  You may take a chewable calcium in place of the tablet for 1-2 months.                  Your next 10 appointments already scheduled     Feb 13, 2018  1:30 PM CST   Post Op with Vero Garrett Rn, RN   [facility] ([facility])    [address] [place] [address]   [phone]            Feb 13, 2018  1:50 PM CST   Post Op with Deloris Rome PA-C   [facility])    [address] [place] [address]   [phone]            Feb 19, 2018  1:00 PM CST   Post Op with Vero Garrett Rn, RN   [facility] ([facility])    [address] [place] [address]   [phone]            Feb 19, 2018  1:30 PM CST   Post Op with Vero Garrett Diet 1, RD   [facility] ([facility])    [address] [place] [address]   [phone]            Mar 06, 2018  9:30 AM CST   Post Op with Vero Garrett Rn RN   [facility] ([facility])    [address] [place] [address]   [phone]              Further instructions from your care team            For  informational purposes only. Not to replace the advice of your health care provider. Copyright   2006 [facility]. All rights reserved. [employer] [id number] - REV 09/14  After Bariatric Surgery  [facility]  Note: Before you go home, ask your nurse to order your pain medicine from the pharmacy. Be sure you have your medicine with you when you leave.  How much fluid should I drink?    Drink at least 1 ounce of fluid every 15 minutes (1/2 cup per hour) during the day.     Carry a water bottle with you. Drink from it often.    Keep track of how much fluid you drink in a day.    Adjustable stomach band: Do not overeat or drink too much. This can cause vomiting (throwing up), stretch your stomach, or make your stomach slip up over your band.    Remember:  - Do not use straws, chew gum or suck on hard candies. They may cause painful gas.   - No cold drinks.  - No coffee, soda pop or drinks with caffeine. These may cause stomach pain.  - No alcohol. It is bad for your liver and will cause stomach pain. It also adds a lot of calories.  What can I do for pain control?      You had major abdominal surgery that involves all layers of your abdominal muscles.   Pain is expected, even as far out as 6-8 weeks postop.  Moving, sneezing, coughing, and  breathing will cause pain because these activities use your abdominal muscles.      You can take the liquid pain medicine as prescribed. You can also try to wean off from it  as soon as you feel comfortable.  Do not drive while you are taking pain medicine.   This is dangerous.     You may take liquid Tylenol (acetaminophen) for pain in place of the prescribed pain  medicine. Do not take more than 3000 mg in a 24 hour period.     You may also apply ice or heat to the affected area(s).  Just remember to wrap the ice  in something and limit icing sessions to 20 minutes. Excessive icing can irritate the skin  or cause tissue damage.   You can apply heat with  a hot, wet towel or heating pad. Just like cold therapy, limit  heat application to 20 minutes. Never sleep with a heating pad on. It could cause severe  burns to your skin.    Do not take NSAID s (ibuprofen, Motrin, Advil, Aleve, Naproxen). They will increase you risk for bleeding or getting an ulcer.    If you have any of the following pain issues, we would like to talk to you:  - pain that does not improve with rest  - pain that gets worse and worse  - pain that is not controlled by your pain medicine  - a sudden severe increase in pain  -   If your doctor prescribes Zantac, take it as ordered. Take it for 3 months to prevent       Ulcers.  -   If you took an antacid before you had surgery, keep taking it for 3 months after           surgery. This will help prevent ulcers.   - Take any other medicines that your doctor tells you to take.   - Wear your binder to support your belly muscles.  Please call the clinic at [phone] for any concerns      How much rest do I need?  Get plenty of rest the first few days after surgery. Balance rest and activity.  Do not nap more than one hour during the day. Set a timer to wake yourself up, if needed. Too much sleep will keep you from drinking enough fluid during the day.  What should I know about my incisions (cuts)?  - Change your bandages as needed or if they get wet.   -Call your doctor if you have any of these signs of infection:   - Redness around the site.   - Drainage that smells bad.   - Fever of 101  F (38.3  C) or higher when taken under the tongue.- Chills.  If you     have a drain:  - Do not pull on the drain. Do not pull the drain out. We will take out your drain at your first clinic visit.  - The color and amount of fluid varies. Right after surgery the fluid is bright red. Over time, it changes to light pink and may become clear or the color of straw.   Will my urine or bowel movements change?   You might not have a bowel movement for several days after  surgery. Your first bowel movements will likely be liquid.   Gastric bypass or sleeve gastrectomy: You may also notice old blood or a darker color in your stools (bowel movements).   Your urine should be clear to light yellow. This shows that you are drinking enough fluid. You should urinate (pass water) at least 2 to 3 times during the day. If not, call us.  What kind of activity is safe?  For 4 weeks after surgery:     Walk for a short time every day.    Do not jog or run.    No weight lifting or belly exercises.  No swimming, baths or hot tubs until your cuts are healed (scabs are gone). You may shower.  No outside activity in hot, humid weather until you can drink 48 to 64 ounces of fluid in 24 hours. If you sweat a lot, your body may lose too much water.   The first month after surgery, do not take any trips where you must sit for a long time. You could get a blood clot in your legs.  Call the clinic at [phone] if:    Your pain medicine is not working.    You do not urinate (pass water) 2 to 3 times per day.    You have any signs of infection.    You have belly pain that gets worse and worse.    You have swollen legs with pain behind the knee or calf.    You have chest pain or feel very short of breath.    You have any questions or concerns.    You have a sudden severe increase in heart rate.    You have vomiting that gets worse and worse.  When should I go back to the clinic?  Time Gastric bypass or sleeve gastrectomy Adjustable gastric band   Week 1 See the physician or physician assistant (PA). See the nurse and physical therapist.   Week 2 See the nurse and dietitian. See the nurse and dietitian.   Week 4 Have a nutrition consult with the dietitian. See the PA and dietitian.   Week 6  Go for the first adjustment (fill) of your stomach band.   For the first year (or until your BMI is less than 30) Go to the clinic each month to see if you need a band adjustment (fill).         Pending Results     No  "orders found from 2/3/2018 to 2/6/2018.            Statement of Approval     Ordered          02/07/18 1411  I have reviewed and agree with all the recommendations and orders detailed in this document.  EFFECTIVE NOW     Approved and electronically signed by:  Destiney Baldwin PA-C             Admission Information     Date & Time Provider Department Dept. Phone    2/5/2018 Yoel Lowry MD [place] [address] [facility] [phone]      Your Vitals Were     Blood Pressure Pulse Temperature Respirations Height Weight    131/80 (BP Location: Right arm) 77 98.1  F (36.7  C) (Oral) 16 1.651 m (5' 5\") 108.6 kg (239 lb 6.7 oz)    Last Period Pulse Oximetry BMI (Body Mass Index)             01/15/2018 93% 39.84 kg/m2         MyChart Information     [employer] gives you secure access to your electronic health record. If you see a primary care provider, you can also send messages to your care team and make appointments. If you have questions, please call your primary care clinic.  If you do not have a primary care provider, please call [phone] and they will assist you.        Care EveryWhere ID     This is your Care EveryWhere ID. This could be used by other organizations to access your [facility] medical records  [id number]        Equal Access to Services     RUBEN CYR AH: Hadii barbi calverto Soaravind, waaxda luqadaha, qaybta kaalmada adeegyada, oscar mata. So [facility] [phone].    ATENCIÓN: Si habla español, tiene a tyler disposición servicios gratuitos de asistencia lingüística. Llame al [phone].    We comply with applicable federal civil rights laws and [place] laws. We do not discriminate on the basis of race, color, national origin, age, disability, sex, sexual orientation, or gender identity.               Review of your medicines      START taking        Dose / Directions    ondansetron 4 MG ODT tab   Commonly known as:  ZOFRAN-ODT   Used for:  Morbid " obesity with BMI of 40.0-44.9, adult (H)        Dose:  4 mg   Take 1 tablet (4 mg) by mouth every 6 hours as needed for nausea or vomiting   Quantity:  20 tablet   Refills:  0       ranitidine 150 MG tablet   Commonly known as:  ZANTAC   Used for:  Morbid obesity with BMI of 40.0-44.9, adult (H)        Dose:  150 mg   Take 1 tablet (150 mg) by mouth 2 times daily   Quantity:  60 tablet   Refills:  2       traMADol 50 MG tablet   Commonly known as:  ULTRAM   Used for:  Morbid obesity with BMI of 40.0-44.9, adult (H)        Dose:   mg   Take 1-2 tablets ( mg) by mouth every 6 hours as needed   Quantity:  30 tablet   Refills:  0         CONTINUE these medicines which have NOT CHANGED        Dose / Directions    etonogestrel-ethinyl estradiol 0.12-0.015 MG/24HR vaginal ring   Commonly known as:  NUVARING        Dose:  1 each   Place 1 each vaginally every 21 days Place 1 ring every 21 days then remove for 1 week. Needs appointment for further refills   Refills:  0       VIACTIV PO        Dose:  1 chew tab   Take 1 chew tab by mouth daily as needed   Refills:  0         STOP taking     MOTRIN IB PO                Where to get your medicines      Some of these will need a paper prescription and others can be bought over the counter. Ask your nurse if you have questions.     Bring a paper prescription for each of these medications     ondansetron 4 MG ODT tab    ranitidine 150 MG tablet    traMADol 50 MG tablet                Protect others around you: Learn how to safely use, store and throw away your medicines at www.disposemymeds.org.        Information about OPIOIDS     PRESCRIPTION OPIOIDS: WHAT YOU NEED TO KNOW    Prescription opioids can be used to help relieve moderate to severe pain and are often prescribed following a surgery or injury, or for certain health conditions. These medications can be an important part of treatment but also come with serious risks. It is important to work with your health  care provider to make sure you are getting the safest, most effective care.    WHAT ARE THE RISKS AND SIDE EFFECTS OF OPIOID USE?  Prescription opioids carry serious risks of addiction and overdose, especially with prolonged use. An opioid overdose, often marked by slowed breathing can cause sudden death. The use of prescription opioids can have a number of side effects as well, even when taken as directed:      Tolerance - meaning you might need to take more of a medication for the same pain relief    Physical dependence - meaning you have symptoms of withdrawal when a medication is stopped    Increased sensitivity to pain    Constipation    Nausea, vomiting, and dry mouth    Sleepiness and dizziness    Confusion    Depression    Low levels of testosterone that can result in lower sex drive, energy, and strength    Itching and sweating    RISKS ARE GREATER WITH:    History of drug misuse, substance use disorder, or overdose    Mental health conditions (such as depression or anxiety)    Sleep apnea    Older age (65 years or older)    Pregnancy    Avoid alcohol while taking prescription opioids.   Also, unless specifically advised by your health care provider, medications to avoid include:    Benzodiazepines (such as Xanax or Valium)    Muscle relaxants (such as Soma or Flexeril)    Hypnotics (such as Ambien or Lunesta)    Other prescription opioids    KNOW YOUR OPTIONS:  Talk to your health care provider about ways to manage your pain that do not involve prescription opioids. Some of these options may actually work better and have fewer risks and side effects:    Pain relievers such as acetaminophen, ibuprofen, and naproxen    Some medications that are also used for depression or seizures    Physical therapy and exercise    Cognitive behavioral therapy, a psychological, goal-directed approach, in which patients learn how to modify physical, behavioral, and emotional triggers of pain and stress    IF YOU ARE  PRESCRIBED OPIOIDS FOR PAIN:    Never take opioids in greater amounts or more often than prescribed    Follow up with your primary health care provider and work together to create a plan on how to manage your pain.    Talk about ways to help manage your pain that do not involve prescription opioids    Talk about all concerns and side effects    Help prevent misuse and abuse    Never sell or share prescription opioids    Never use another person's prescription opioids    Store prescription opioids in a secure place and out of reach of others (this may include visitors, children, friends, and family)    Visit www.cdc.gov/drugoverdose to learn about risks of opioid abuse and overdose    If you believe you may be struggling with addiction, tell your health care provider and ask for guidance or call [facility]'s National Helpline at 1-497-558-HELP    LEARN MORE / www.cdc.gov/drugoverdose/prescribing/guideline.html    Safely dispose of unused prescription opioids: Find your local drug take-back programs and more information about the importance of safe disposal at www.doseofreality.mn.gov             Medication List: This is a list of all your medications and when to take them. Check marks below indicate your daily home schedule. Keep this list as a reference.      Medications           Morning Afternoon Evening Bedtime As Needed    etonogestrel-ethinyl estradiol 0.12-0.015 MG/24HR vaginal ring   Commonly known as:  NUVARING   Place 1 each vaginally every 21 days Place 1 ring every 21 days then remove for 1 week. Needs appointment for further refills                                ondansetron 4 MG ODT tab   Commonly known as:  ZOFRAN-ODT   Take 1 tablet (4 mg) by mouth every 6 hours as needed for nausea or vomiting   Last time this was given:  2/7/18 at 10:40am   Next Dose Due:  Next dose available at 4:40pm                                   ranitidine 150 MG tablet   Commonly known as:  ZANTAC   Take 1 tablet (150 mg) by  mouth 2 times daily   Last time this was given:  150 mg on 2/7/2018 10:40 AM                                      traMADol 50 MG tablet   Commonly known as:  ULTRAM   Take 1-2 tablets ( mg) by mouth every 6 hours as needed                                   VIACTIV PO   Take 1 chew tab by mouth daily as needed

## 2018-02-05 NOTE — ANESTHESIA CARE TRANSFER NOTE
Patient: Geneva Cooley    Procedure(s):   LAPAROSCOPIC GASTRIC SLEEVE  - Wound Class: I-Clean    Diagnosis: MORBID OBESITY   Diagnosis Additional Information: No value filed.    Anesthesia Type:   General, ETT     Note:  Airway :Face Mask  Patient transferred to:PACU  Comments: Transferred to PACU, spontaneous respirations, 10L oxygen via facemask.  All monitors and alarms on and functioning, VSS.  Patient awake, comfortable.  Report to PACU RN.Handoff Report: Identifed the Patient, Identified the Reponsible Provider, Reviewed the pertinent medical history, Discussed the surgical course, Reviewed Intra-OP anesthesia mangement and issues during anesthesia, Set expectations for post-procedure period and Allowed opportunity for questions and acknowledgement of understanding      Vitals: (Last set prior to Anesthesia Care Transfer)    CRNA VITALS  2/5/2018 1207 - 2/5/2018 1243      2/5/2018             Pulse: 85    SpO2: 98 %    Resp Rate (observed): 11    Resp Rate (set): 10                Electronically Signed By: CLAIRE Siddiqui CRNA  February 5, 2018  12:43 PM

## 2018-02-05 NOTE — ANESTHESIA POSTPROCEDURE EVALUATION
Patient: Geneva Cooley    Procedure(s):   LAPAROSCOPIC GASTRIC SLEEVE  - Wound Class: I-Clean    Diagnosis:MORBID OBESITY   Diagnosis Additional Information: No value filed.    Anesthesia Type:  General, ETT    Note:  Anesthesia Post Evaluation    Patient location during evaluation: PACU  Patient participation: Able to fully participate in evaluation  Level of consciousness: awake and alert  Pain management: adequate  Airway patency: patent  Cardiovascular status: acceptable  Respiratory status: acceptable  Hydration status: acceptable  PONV: none and controlled     Anesthetic complications: None          Last vitals:  Vitals:    02/05/18 1420 02/05/18 1433 02/05/18 1459   BP: (!) 143/93  131/84   Pulse:      Resp: 19  16   Temp: 36.1  C (97  F)  36.1  C (97  F)   SpO2: 99% 98% 96%         Electronically Signed By: Evan Kapadia MD  February 5, 2018  3:12 PM

## 2018-02-05 NOTE — PHARMACY-CONSULT NOTE
Bariatric Consult    Medications evaluated as requested per Bariatric Consult.    No medication changes were needed based on the Bariatric Medication Management Policy.    The pharmacist will continue to follow as new medications are ordered.    Alise Tavarez, LesleyD, BCPS

## 2018-02-05 NOTE — BRIEF OP NOTE
General Surgery Brief Operative Note    Pre-operative diagnosis: MORBID OBESITY    Post-operative diagnosis Same   Procedure: Procedure(s):   LAPAROSCOPIC GASTRIC SLEEVE  - Wound Class: I-Clean    Surgeon(s), Assistant(s): Surgeon(s) and Role:     * Yoel Lowry MD - Primary     * Destiney Baldwin PA-C - Assisting   Estimated blood loss: 5 mL   Drains: None   Specimens:   ID Type Source Tests Collected by Time Destination   A : PORTION OF STOMACH Tissue Stomach, Body SURGICAL PATHOLOGY EXAM Yoel Lowry MD 2/5/2018 12:00 PM       Findings: See postop diagnosis   Condition: Stable   Comments:      Destiney Baldwin PA-C See dictated operative report for full details

## 2018-02-05 NOTE — OP NOTE
Surgeon: Yoel Lowry MD.  1st Assistant: Destiney Baldwin PA-C, The physicians assistant was medically necessary for their expertise in camera management, suctioning, suturing, and retraction.    PREOPERATIVE DIAGNOSES:   1. Morbid obesity. Mutiple comorbidities  POSTOPERATIVE DIAGNOSES:   1. Morbid obesity.   OPERATIVE PROCEDURE: Laparoscopic sleeve gastrectomy for morbid obesity.   ANESTHESIA: General.   ESTIMATED BLOOD LOSS: Less than 5 mL.   OPERATIVE PROCEDURE: After induction of general endotracheal anesthesia, the abdomen was prepped and draped in the usual sterile fashion. With a Veress needle through a left supraumbilical site pneumoperitoneum was established with low pressures. A 5mm trocar was introduced.  Initial survey of the abdomen showed a normal appearance to the liver and omentum. A 15 mm trocar was placed in the right mid abdomen, a 5 mm trocar was placed in the left mid abdomen, and a 5 mm trocar was placed in the left flank, and a 5mm trocar in the right subxiphoid position. A Kody retractor was placed through a subxiphoid incision and used to elevate the left lobe of the liver anterior and to the right.  We then took down the angle of His with LigaSure and blunt dissection. This was followed by evaluation of the pylorus and miguelito the greater curve of the stomach 6 cm proximal to the pylorus. We then took down the short gastric vessels with LigaSure device all the way from our miguelito 6 cm proximal to the pylorus up to the GE junction. Once the stomach was mobilized, we ensured no posterior adhesions to the pancreas were inhibiting adequate positioning of the stomach. At this point, a 40-Romanian orogastric bougie was advanced into the patient's stomach and down into the duodenum and utilized to gauge the size of our sleeve gastrectomy. We start firing Two Black 60 Endo-MAEVE staplers which had incorporated buttressing material from the 6 cm miguelito, proximal to the pylorus all the way up to the GE  junction. At first we utilized black loads, later on purple loads with suture line reinforcement. With each load we tightened the load against the bougie, had the bougie moved slightly out and in without resistance, and then fired the load.  We ensured no twisting of the sleeve, good size of the sleeve and hemostatic and secure apposition of staples.  Staple line from top to bottom was sewn with 2-0 Vicryl, attaching the adjacent omentum to the staple line. Once the resection was completed, the amputated stomach was removed from the patient's abdomen and sent to pathology. The Gallbladder was evaluated and found to be normal.  The abdomen was surveyed 1 more time no evidence of injuries noted. The 15  mm trocar in the right abdomen was removed under direct visualization without evidence of bleeding and closed with Silver-Cassie device using an 0 Vicryl suture. The remaining trocars were removed under direct visualization without evidence of bleeding. Pneumoperitoneum was released and skin was approximated in all port sites with 4-0 Vicryl. Steri-Strips and sterile dressing applied. No immediate complications. Sponge and needle count reported correct.     Yoel Lowry M.D.

## 2018-02-06 ENCOUNTER — APPOINTMENT (OUTPATIENT)
Dept: GENERAL RADIOLOGY | Facility: CLINIC | Age: 33
End: 2018-02-06
Attending: PHYSICIAN ASSISTANT
Payer: COMMERCIAL

## 2018-02-06 LAB
ANION GAP SERPL CALCULATED.3IONS-SCNC: 9 MMOL/L (ref 3–14)
CHLORIDE SERPL-SCNC: 106 MMOL/L (ref 94–109)
CO2 SERPL-SCNC: 23 MMOL/L (ref 20–32)
COPATH REPORT: NORMAL
HGB BLD-MCNC: 11.9 G/DL (ref 11.7–15.7)
POTASSIUM SERPL-SCNC: 3.9 MMOL/L (ref 3.4–5.3)
SODIUM SERPL-SCNC: 138 MMOL/L (ref 133–144)

## 2018-02-06 PROCEDURE — 36415 COLL VENOUS BLD VENIPUNCTURE: CPT | Performed by: PHYSICIAN ASSISTANT

## 2018-02-06 PROCEDURE — 25000128 H RX IP 250 OP 636: Performed by: PHYSICIAN ASSISTANT

## 2018-02-06 PROCEDURE — 80051 ELECTROLYTE PANEL: CPT | Performed by: PHYSICIAN ASSISTANT

## 2018-02-06 PROCEDURE — 85018 HEMOGLOBIN: CPT | Performed by: PHYSICIAN ASSISTANT

## 2018-02-06 PROCEDURE — 40000986 XR UPPER GI WATER SOLUBLE

## 2018-02-06 PROCEDURE — 25000125 ZZHC RX 250: Performed by: PHYSICIAN ASSISTANT

## 2018-02-06 PROCEDURE — 12000007 ZZH R&B INTERMEDIATE

## 2018-02-06 RX ORDER — SCOLOPAMINE TRANSDERMAL SYSTEM 1 MG/1
1 PATCH, EXTENDED RELEASE TRANSDERMAL
Status: DISCONTINUED | OUTPATIENT
Start: 2018-02-06 | End: 2018-02-07 | Stop reason: HOSPADM

## 2018-02-06 RX ADMIN — PROCHLORPERAZINE EDISYLATE 10 MG: 5 INJECTION INTRAMUSCULAR; INTRAVENOUS at 17:57

## 2018-02-06 RX ADMIN — KETOROLAC TROMETHAMINE 30 MG: 30 INJECTION, SOLUTION INTRAMUSCULAR at 13:20

## 2018-02-06 RX ADMIN — KETOROLAC TROMETHAMINE 30 MG: 30 INJECTION, SOLUTION INTRAMUSCULAR at 00:57

## 2018-02-06 RX ADMIN — KETOROLAC TROMETHAMINE 30 MG: 30 INJECTION, SOLUTION INTRAMUSCULAR at 06:36

## 2018-02-06 RX ADMIN — SODIUM CHLORIDE, POTASSIUM CHLORIDE, SODIUM LACTATE AND CALCIUM CHLORIDE: 600; 310; 30; 20 INJECTION, SOLUTION INTRAVENOUS at 04:30

## 2018-02-06 RX ADMIN — HYDROMORPHONE HYDROCHLORIDE: 10 INJECTION, SOLUTION INTRAMUSCULAR; INTRAVENOUS; SUBCUTANEOUS at 06:37

## 2018-02-06 RX ADMIN — ENOXAPARIN SODIUM 40 MG: 40 INJECTION SUBCUTANEOUS at 21:34

## 2018-02-06 RX ADMIN — SODIUM CHLORIDE, POTASSIUM CHLORIDE, SODIUM LACTATE AND CALCIUM CHLORIDE: 600; 310; 30; 20 INJECTION, SOLUTION INTRAVENOUS at 18:19

## 2018-02-06 RX ADMIN — ENOXAPARIN SODIUM 40 MG: 40 INJECTION SUBCUTANEOUS at 10:17

## 2018-02-06 RX ADMIN — ONDANSETRON 4 MG: 2 INJECTION INTRAMUSCULAR; INTRAVENOUS at 04:24

## 2018-02-06 RX ADMIN — CEFAZOLIN SODIUM 2 G: 2 SOLUTION INTRAVENOUS at 00:56

## 2018-02-06 RX ADMIN — ONDANSETRON 4 MG: 2 INJECTION INTRAMUSCULAR; INTRAVENOUS at 10:26

## 2018-02-06 RX ADMIN — ONDANSETRON 4 MG: 2 INJECTION INTRAMUSCULAR; INTRAVENOUS at 17:20

## 2018-02-06 RX ADMIN — DIATRIZOATE MEGLUMINE AND DIATRIZOATE SODIUM 30 ML: 660; 100 SOLUTION ORAL; RECTAL at 08:13

## 2018-02-06 RX ADMIN — SCOPALAMINE 1 PATCH: 1 PATCH, EXTENDED RELEASE TRANSDERMAL at 13:20

## 2018-02-06 NOTE — PROGRESS NOTES
"[facility]  Bariatric Surgery Progress Note    Admission Date: 2018         Assessment and Plan:   Geneva Cooley is a 32 year old female S/P Procedure(s):  LAPAROSCOPIC GASTRIC SLEEVE, 1 Day Post-Op.    - Discontinue white  - UGI ok, start clears and PO meds  - Discontinue PCA if tolerates clears, start PO pain meds and ordered home meds  - Hgb stable, start Lovenox  - Scop patch  - Appreciate Pharmacy and Nutrition assistance  - Ambulate 4x day and encourage IS  - Home probably tomorrow             Interval History:   Sore but pain controlled, nausea, feels this way with empty stomach, UO adequate, white in, ambulating some. Meds reviewed.                     Physical Exam:   Blood pressure 119/77, pulse 70, temperature 97.8  F (36.6  C), temperature source Oral, resp. rate 18, height 5' 5\" (1.651 m), weight 239 lb 6.7 oz (108.6 kg), last menstrual period 01/15/2018, SpO2 98 %.  Temperature Temp  Av.5  F (36.4  C)  Min: 97  F (36.1  C)  Max: 98.3  F (36.8  C)   I/O last 3 completed shifts:  In: 4796 [P.O.:120; I.V.:4676]  Out: 1980 [Urine:1975; Blood:5]  Constitutional:  Awake, alert, oriented, and in no apparent distress.   Lungs: No increased work of breathing, good air exchange, clear to auscultation bilaterally, and no crackles or wheezing.   Cardiovascular: Regular rate and rhythm, normal S1 and S2, and no murmur noted.   Abdomen: Soft, non-distended, appropriately tender at incision(s). Binder present.    Wounds: Clean, dry, and intact. No erythema or drainage.    Extremities: No edema or calf tenderness. +SCDs.          Data:   UGI: Postoperative changes of gastric sleeve. Orally administered contrast passes from the esophagus through the gastric  remanent and into the small bowel without evidence of leak.    Recent Labs   Lab Test  18   0850  18   1700  18   1029  17   1305  14   1200   WBC   --    --   7.0  9.4  7.2   HGB  11.9   --   13.4  " 13.5  13.3   HCT   --    --   40.0  40.6  39.4   PLT   --   296  300  333  311      Recent Labs   Lab Test  02/06/18   0850  02/05/18   1700  01/26/18   1029  07/03/17   1305  03/21/14   1200   POTASSIUM  3.9   --   4.3  3.9  3.9   CHLORIDE  106   --   108  107  105   CO2  23   --   23  24  23   BUN   --    --   14  10  12   CR   --   0.49*  0.58  0.63  0.61     No results for input(s): GLC, BGM in the last 168 hours.    Dsetiney Baldwin PA-C  [facility]  [phone]

## 2018-02-06 NOTE — PLAN OF CARE
Problem: Patient Care Overview  Goal: Plan of Care/Patient Progress Review  Outcome: Improving  A&O. VSS> Pain 2/10 managed with PCA. Lap sites CDI. C/o nause with int dry heaving but no emesis, managed with zofran and scop patch. Tolerating some fluids. Fried d/c'd, attempted but unable to void and no c/o of discomfort.

## 2018-02-06 NOTE — PLAN OF CARE
Problem: Patient Care Overview  Goal: Plan of Care/Patient Progress Review  Outcome: Improving  A&O. VSS on RA. C/o back pain, applied heat. Six lap sites CDI. Binder in place. Dilaudid PCA @ 0.2 for pain control along with scheduled toradol. Fried in place; AUO. Stood at bedside with 1 assist. Will continue to monitor.

## 2018-02-06 NOTE — PROGRESS NOTES
Arrived from PACU vitally stable but lethargic, arouses to repeated voice attempts. C/o back pain, rotating ice/heat as well as scheduled toradol given with relief. 5 lap sites CDI. Binder and ice in place. Dilaudid PCA @ 0.2 for pain control. Addequate UOP. Will cont to monitor.

## 2018-02-06 NOTE — CONSULTS
"NUTRITION EDUCATION    REASON FOR ASSESSMENT:  Bariatric Surgery Consult    CURRENT DIET:  Bariatric Clear Liquids    NUTRITION HISTORY:  Patient worked with clinical dietitian in [facility] prior to surgery to assist with lifestyle modification.    ANTHROPOMETRICS:   Ht: 5' 5\"  Wt: 239 lbs 6.71 oz (108.6 kg)  BMI: Body mass index is 39.84 kg/(m^2).  IBW: 56.8 kg  %IBW: 191%    ASSESSED NUTRITION NEEDS:  Energy Needs: 0906-6994 kcals (11 - 14 kcal/kg ABW)                      Protein Needs: 57-85g (1 - 1.5 gm/kg IBW)  Fluid Needs: 0022-2428 (1mL/kcal/ABW)    Know patient will not meet assessed needs due to the restrictive nature of surgery.    NUTRITION DIAGNOSIS:   Food- and nutrition related knowledge deficit related to bariatric surgery as evidence by laparoscopic sleeve gastrectomy surgery on 2/5/2018.    INTERVENTIONS:    Nutrition Prescription:    Recommended patient follow bariatric diet advancement for laparoscopic sleeve gastrectomy    Implementation:    Nutrition Education (Content):  a) Reviewed laparoscopic sleeve gastrectomy diet guidelines  b) Provided handouts:  Nutrition After laparoscopic sleeve gastrectomy and Vitamin and Mineral Supplements After Weight Loss Surgery    Nutrition Education (Application):  c) Discussed current eating habits and recommended alternative food choices  d) Patient verbalizes understanding of diet by listing appropriate foods for home    Anticipate good compliance    Diet Education - refer to Education Flowsheet    Goals:    Patient will follow bariatric diet advancement schedule    Patient will follow post-operative vitamin mineral schedule      Follow Up:    Patient to follow up in 2 weeks with RD in Weight Loss Clinic    Provided RD contact information for future questions      Shilpa Gonzalez RD, LD  Clinical Dietitian     "

## 2018-02-07 VITALS
WEIGHT: 239.42 LBS | RESPIRATION RATE: 16 BRPM | DIASTOLIC BLOOD PRESSURE: 89 MMHG | SYSTOLIC BLOOD PRESSURE: 138 MMHG | HEIGHT: 65 IN | BODY MASS INDEX: 39.89 KG/M2 | OXYGEN SATURATION: 96 % | TEMPERATURE: 98.5 F | HEART RATE: 88 BPM

## 2018-02-07 LAB — GLUCOSE BLDC GLUCOMTR-MCNC: 79 MG/DL (ref 70–99)

## 2018-02-07 PROCEDURE — 25000132 ZZH RX MED GY IP 250 OP 250 PS 637: Performed by: PHYSICIAN ASSISTANT

## 2018-02-07 PROCEDURE — 25000128 H RX IP 250 OP 636: Performed by: PHYSICIAN ASSISTANT

## 2018-02-07 PROCEDURE — 00000146 ZZHCL STATISTIC GLUCOSE BY METER IP

## 2018-02-07 RX ORDER — TRAMADOL HYDROCHLORIDE 50 MG/1
50-100 TABLET ORAL EVERY 6 HOURS PRN
Qty: 30 TABLET | Refills: 0 | Status: SHIPPED | OUTPATIENT
Start: 2018-02-07 | End: 2018-05-14

## 2018-02-07 RX ORDER — OXYCODONE HYDROCHLORIDE 5 MG/1
5-10 TABLET ORAL
Qty: 30 TABLET | Refills: 0 | Status: SHIPPED | OUTPATIENT
Start: 2018-02-07 | End: 2018-02-07

## 2018-02-07 RX ORDER — ONDANSETRON 4 MG/1
4 TABLET, ORALLY DISINTEGRATING ORAL EVERY 6 HOURS PRN
Qty: 20 TABLET | Refills: 0 | Status: SHIPPED | OUTPATIENT
Start: 2018-02-07 | End: 2018-05-14

## 2018-02-07 RX ORDER — TRAMADOL HYDROCHLORIDE 50 MG/1
50-100 TABLET ORAL EVERY 6 HOURS PRN
Status: DISCONTINUED | OUTPATIENT
Start: 2018-02-07 | End: 2018-02-07 | Stop reason: HOSPADM

## 2018-02-07 RX ORDER — OXYCODONE HYDROCHLORIDE 5 MG/1
5-10 TABLET ORAL
Status: DISCONTINUED | OUTPATIENT
Start: 2018-02-07 | End: 2018-02-07

## 2018-02-07 RX ORDER — HYDROMORPHONE HYDROCHLORIDE 1 MG/ML
.3-.5 INJECTION, SOLUTION INTRAMUSCULAR; INTRAVENOUS; SUBCUTANEOUS
Status: DISCONTINUED | OUTPATIENT
Start: 2018-02-07 | End: 2018-02-07 | Stop reason: HOSPADM

## 2018-02-07 RX ADMIN — SODIUM CHLORIDE, POTASSIUM CHLORIDE, SODIUM LACTATE AND CALCIUM CHLORIDE: 600; 310; 30; 20 INJECTION, SOLUTION INTRAVENOUS at 00:22

## 2018-02-07 RX ADMIN — SODIUM CHLORIDE, POTASSIUM CHLORIDE, SODIUM LACTATE AND CALCIUM CHLORIDE: 600; 310; 30; 20 INJECTION, SOLUTION INTRAVENOUS at 06:26

## 2018-02-07 RX ADMIN — TRAMADOL HYDROCHLORIDE 100 MG: 50 TABLET, COATED ORAL at 16:45

## 2018-02-07 RX ADMIN — ONDANSETRON 4 MG: 2 INJECTION INTRAMUSCULAR; INTRAVENOUS at 10:40

## 2018-02-07 RX ADMIN — ENOXAPARIN SODIUM 40 MG: 40 INJECTION SUBCUTANEOUS at 10:40

## 2018-02-07 RX ADMIN — RANITIDINE 150 MG: 150 TABLET ORAL at 10:40

## 2018-02-07 RX ADMIN — ONDANSETRON 4 MG: 2 INJECTION INTRAMUSCULAR; INTRAVENOUS at 03:01

## 2018-02-07 NOTE — PLAN OF CARE
Problem: Patient Care Overview  Goal: Plan of Care/Patient Progress Review  Outcome: Adequate for Discharge Date Met: 02/07/18  A&O. VSS. Pain managed with ultram. Discussed discharge instructions and medications with pt, verbalized understanding. Pt discharged to home with belongings and medications. Transportation provided by pt's brother.

## 2018-02-07 NOTE — DISCHARGE SUMMARY
"  Discharge Summary    Geneva Cooley MRN# [id number]   YOB: 1985 Age: 32 year old     Date of Admission:  2/5/2018  Date of Discharge:  2/7/2018  Admitting Physician:  Yoel Lowry MD  Discharging Service:  Surgery  Primary Provider: Jody Coughlin         Admission Diagnosis:   Principle Diagnosis: Morbid Obesity Body mass index is 39.84 kg/(m^2).  Secondary Diagnosis: None         Procedures:   Procedure(s):  LAPAROSCOPIC GASTRIC SLEEVE         Brief History of Illness:   This patient was a 32 year old female who presented to the [facility] for potential bariatric surgery.  The patient has failed previous weight loss attempts and has co-morbidities of back pain, right knee pain, snoring, and dyspnea with exertion due to morbid obesity.  After pre-op screening, discussing the risks, benefits, and possible complications, an informed consent was obtained and the patient underwent the above procedure.  Please see the Operative Report for full details.         Hospital Course:   The patient had some nausea without emesis likely from the PCA use.  She otherwise recovered as anticipated.  The patient was discharged to home in stable condition by the surgical service.  She verbalized understanding of all discharge instructions.  She was asked to call with any further questions or concerns.  Please see chart for details.          Consultations:   Consultation during this admission received from nutrition          Discharge Disposition:   Discharged to home          Condition on Discharge:   Discharge condition: Stable   Discharge vitals: Blood pressure 131/80, pulse 77, temperature 98.1  F (36.7  C), temperature source Oral, resp. rate 16, height 5' 5\" (1.651 m), weight 239 lb 6.7 oz (108.6 kg), last menstrual period 01/15/2018, SpO2 93 %.           Discharge Medications:     Current Discharge Medication List      START taking these medications    Details "   ondansetron (ZOFRAN-ODT) 4 MG ODT tab Take 1 tablet (4 mg) by mouth every 6 hours as needed for nausea or vomiting  Qty: 20 tablet, Refills: 0    Associated Diagnoses: Morbid obesity with BMI of 40.0-44.9, adult (H)      ranitidine (ZANTAC) 150 MG tablet Take 1 tablet (150 mg) by mouth 2 times daily  Qty: 60 tablet, Refills: 2    Associated Diagnoses: Morbid obesity with BMI of 40.0-44.9, adult (H)      traMADol (ULTRAM) 50 MG tablet Take 1-2 tablets ( mg) by mouth every 6 hours as needed  Qty: 30 tablet, Refills: 0    Associated Diagnoses: Morbid obesity with BMI of 40.0-44.9, adult (H)         CONTINUE these medications which have NOT CHANGED    Details   Calcium-Vitamin D-Vitamin K (VIACTIV PO) Take 1 chew tab by mouth daily as needed      etonogestrel-ethinyl estradiol (NUVARING) 0.12-0.015 MG/24HR vaginal ring Place 1 each vaginally every 21 days Place 1 ring every 21 days then remove for 1 week. Needs appointment for further refills         STOP taking these medications       MOTRIN IB PO Comments:   Reason for Stopping:                    Discharge Instructions:    Follow up at the [facility] next week as scheduled.  Please call   the clinic if you have any questions or concerns.  Follow up with your PCP   in 4-6 weeks.  Continue to ambulate and use your incentive spirometer at   home.  Do not take NSAIDs (such as ibuprofen, naproxen, or aspirin).  You   may hold your vitamins/supplements if you are having nausea, try to resume   your chewable vitamin if you are able to.  You may take a chewable calcium   in place of the tablet for 1-2 months.                 After Care Instructions     Activity       Your activity upon discharge: activity as tolerated. No heavy lifting > 20 lbs or strenuous exercise x 3-4 weeks. No driving or alcohol while on pain meds.            Diet       Follow this diet upon discharge: bariatric full liquid diet            Wound care and dressings       Instructions to  care for your wound at home: keep wound(s) clean and dry. You may shower, but do not soak incisions x 2 weeks. Leave steri strips in place, they will fall off on their own. Apply dry dressing as needed.                      Destiney Baldwin PA-C, dictating on behalf of Yoel Lowry MD  Office #: [phone]

## 2018-02-07 NOTE — PROGRESS NOTES
"[facility]  Bariatric Surgery Progress Note    Admission Date: 2018         Assessment and Plan:   Geneva Cooley is a 32 year old female S/P Procedure(s):  LAPAROSCOPIC GASTRIC SLEEVE, 2 Days Post-Op.     - Continue bariatric fulls as tolerated  - Discontinue PCA, try oxycodone, Dilaudid for breakthrough  - Ambulate 4x day and encourage IS  - Shower  - Home later today if improves, otherwise tomorrow  - Zofran at discharge  - Needs a lot of encouragement to move    Addendum: Nausea resolved after PCA discontinued. States Oxycodone too strong for her. Agrees to Ultram. Ok to discharge.             Interval History:   Nausea without emesis, feels like she has phlegm vs due to PCA, had some food, pain controlled with PCA, UO adequate, not up much due to nausea. Meds reviewed.                     Physical Exam:   Blood pressure 127/85, pulse 80, temperature 98.3  F (36.8  C), temperature source Oral, resp. rate 16, height 5' 5\" (1.651 m), weight 239 lb 6.7 oz (108.6 kg), last menstrual period 01/15/2018, SpO2 94 %.  Temperature Temp  Av.2  F (36.8  C)  Min: 97.9  F (36.6  C)  Max: 98.4  F (36.9  C)   I/O last 3 completed shifts:  In: 3798 [P.O.:275; I.V.:3523]  Out: 2625 [Urine:2625]  Constitutional:  Awake, alert, oriented, and in no apparent distress.   Lungs: No increased work of breathing, good air exchange, clear to auscultation bilaterally, and no crackles or wheezing.   Cardiovascular: Regular rate and rhythm, normal S1 and S2, and no murmur noted.   Abdomen: Soft, non-distended, appropriately tender at incision(s), + BS. Binder present.    Wounds: Clean, dry, and intact.  No erythema or drainage.    Extremities: No edema or calf tenderness. +SCDs.          Data:       Recent Labs  Lab 18  0643   BGM 79       Destiney Baldwin PA-C  [facility]  [phone]  "

## 2018-02-07 NOTE — PLAN OF CARE
Problem: Patient Care Overview  Goal: Plan of Care/Patient Progress Review  A/Ox4. VSS. Lap sites with bandaids, CDI. BS hypo, not passing gas abd binder in place.C/o nausea not relieved by Zofran, Compazine administered with relief. Appetite poor d/t nausea. Pain managed with PCA Dilaudid. Adequate UOP.

## 2018-02-07 NOTE — DISCHARGE INSTRUCTIONS
For informational purposes only. Not to replace the advice of your health care provider. Copyright   2006 [facility]. All rights reserved. Lucky Ant [id number] - REV 09/14  After Bariatric Surgery  [facility]  Note: Before you go home, ask your nurse to order your pain medicine from the pharmacy. Be sure you have your medicine with you when you leave.  How much fluid should I drink?    Drink at least 1 ounce of fluid every 15 minutes (1/2 cup per hour) during the day.     Carry a water bottle with you. Drink from it often.    Keep track of how much fluid you drink in a day.    Adjustable stomach band: Do not overeat or drink too much. This can cause vomiting (throwing up), stretch your stomach, or make your stomach slip up over your band.    Remember:  - Do not use straws, chew gum or suck on hard candies. They may cause painful gas.   - No cold drinks.  - No coffee, soda pop or drinks with caffeine. These may cause stomach pain.  - No alcohol. It is bad for your liver and will cause stomach pain. It also adds a lot of calories.  What can I do for pain control?      You had major abdominal surgery that involves all layers of your abdominal muscles.   Pain is expected, even as far out as 6-8 weeks postop.  Moving, sneezing, coughing, and  breathing will cause pain because these activities use your abdominal muscles.      You can take the liquid pain medicine as prescribed. You can also try to wean off from it  as soon as you feel comfortable.  Do not drive while you are taking pain medicine.   This is dangerous.     You may take liquid Tylenol (acetaminophen) for pain in place of the prescribed pain  medicine. Do not take more than 3000 mg in a 24 hour period.     You may also apply ice or heat to the affected area(s).  Just remember to wrap the ice  in something and limit icing sessions to 20 minutes. Excessive icing can irritate the skin  or cause tissue damage.   You can apply  heat with a hot, wet towel or heating pad. Just like cold therapy, limit  heat application to 20 minutes. Never sleep with a heating pad on. It could cause severe  burns to your skin.    Do not take NSAID s (ibuprofen, Motrin, Advil, Aleve, Naproxen). They will increase you risk for bleeding or getting an ulcer.    If you have any of the following pain issues, we would like to talk to you:  - pain that does not improve with rest  - pain that gets worse and worse  - pain that is not controlled by your pain medicine  - a sudden severe increase in pain  -   If your doctor prescribes Zantac, take it as ordered. Take it for 3 months to prevent       Ulcers.  -   If you took an antacid before you had surgery, keep taking it for 3 months after           surgery. This will help prevent ulcers.   - Take any other medicines that your doctor tells you to take.   - Wear your binder to support your belly muscles.  Please call the clinic at [phone] for any concerns      How much rest do I need?  Get plenty of rest the first few days after surgery. Balance rest and activity.  Do not nap more than one hour during the day. Set a timer to wake yourself up, if needed. Too much sleep will keep you from drinking enough fluid during the day.  What should I know about my incisions (cuts)?  - Change your bandages as needed or if they get wet.   -Call your doctor if you have any of these signs of infection:   - Redness around the site.   - Drainage that smells bad.   - Fever of 101  F (38.3  C) or higher when taken under the tongue.- Chills.  If you     have a drain:  - Do not pull on the drain. Do not pull the drain out. We will take out your drain at your first clinic visit.  - The color and amount of fluid varies. Right after surgery the fluid is bright red. Over time, it changes to light pink and may become clear or the color of straw.   Will my urine or bowel movements change?   You might not have a bowel movement for several days  after surgery. Your first bowel movements will likely be liquid.   Gastric bypass or sleeve gastrectomy: You may also notice old blood or a darker color in your stools (bowel movements).   Your urine should be clear to light yellow. This shows that you are drinking enough fluid. You should urinate (pass water) at least 2 to 3 times during the day. If not, call us.  What kind of activity is safe?  For 4 weeks after surgery:     Walk for a short time every day.    Do not jog or run.    No weight lifting or belly exercises.  No swimming, baths or hot tubs until your cuts are healed (scabs are gone). You may shower.  No outside activity in hot, humid weather until you can drink 48 to 64 ounces of fluid in 24 hours. If you sweat a lot, your body may lose too much water.   The first month after surgery, do not take any trips where you must sit for a long time. You could get a blood clot in your legs.  Call the clinic at [phone] if:    Your pain medicine is not working.    You do not urinate (pass water) 2 to 3 times per day.    You have any signs of infection.    You have belly pain that gets worse and worse.    You have swollen legs with pain behind the knee or calf.    You have chest pain or feel very short of breath.    You have any questions or concerns.    You have a sudden severe increase in heart rate.    You have vomiting that gets worse and worse.  When should I go back to the clinic?  Time Gastric bypass or sleeve gastrectomy Adjustable gastric band   Week 1 See the physician or physician assistant (PA). See the nurse and physical therapist.   Week 2 See the nurse and dietitian. See the nurse and dietitian.   Week 4 Have a nutrition consult with the dietitian. See the PA and dietitian.   Week 6  Go for the first adjustment (fill) of your stomach band.   For the first year (or until your BMI is less than 30) Go to the clinic each month to see if you need a band adjustment (fill).

## 2018-02-07 NOTE — PROGRESS NOTES
Afebrile, pulse OK. Up some. Has sore feeling in throat since operation. Tolerating PO.   Encourage to walk, sit in chair. Hope to get home today.

## 2018-02-07 NOTE — PLAN OF CARE
Problem: Patient Care Overview  Goal: Plan of Care/Patient Progress Review  Outcome: Improving  A&O X 4, AVSS  Good pain control with pca. C/o nausea x 1 relieved wit zofran. Voided good amt in bathroom.

## 2018-02-08 ENCOUNTER — TELEPHONE (OUTPATIENT)
Dept: FAMILY MEDICINE | Facility: OTHER | Age: 33
End: 2018-02-08

## 2018-02-08 NOTE — TELEPHONE ENCOUNTER
RN to call for hospital follow up:    Reason for follow up: Geneva JR Cooley appeared on our list for being seen in an Emergency Room or a recent Hospital discharge.    Admitting date: 2/5/18  Discharge date: 2/7/18  Location: [facility]  Reason for visit:   Chief Complaint   Patient presents with     Hospital F/U     LAPAROSCOPIC GASTRIC SLEEVE     Bibi Hernandez RN, BSN

## 2018-02-08 NOTE — TELEPHONE ENCOUNTER
"Hospital/TCU/ED for chronic condition Discharge Protocol  \"Hi, my name is Bibi, a registered nurse, and I am calling from [facility].  I am calling to follow up and see how things are going for you after your recent emergency visit/hospital/TCU stay.\"  Tell me how you are doing now that you are home?\" Patient states she is doing fairly well. Took a Zofran and Tramadol this morning for the pain. It still hurts to eat or drink anything, but is getting better. Her right shoulder continues to hurt and she is wondering how long this should last.  Discharge Instructions  What is/are the follow-up recommendations?   \"Has an appointment with your primary care provider been scheduled?\"  No, not needed at this time.   Medications  \"Tell me what changed about your medicines when you discharged?\" None  \"What questions do you have about your medications?\" None   New diagnoses of heart failure, COPD, diabetes, or MI? No    Medication reconciliation completed? No  Was MTM referral placed (*Make sure to put transitions as reason for referral)? No  Call Summary  \"What questions or concerns do you have about your recent visit and your follow-up care?\"  Looking for advice regarding the continued shoulder pain - will route to surgery team.     Bibi Hernandez, RN, BSN        "

## 2018-02-13 ENCOUNTER — TELEPHONE (OUTPATIENT)
Dept: SURGERY | Facility: CLINIC | Age: 33
End: 2018-02-13

## 2018-02-13 PROBLEM — E86.0 DEHYDRATION: Status: ACTIVE | Noted: 2018-02-13

## 2018-02-13 PROBLEM — Z98.84 BARIATRIC SURGERY STATUS: Status: ACTIVE | Noted: 2018-02-13

## 2018-02-13 NOTE — TELEPHONE ENCOUNTER
"Patient LM on nurse line.  Patient is 1 week PO gastric sleeve.  Hasn't had BM since last Wednesday - POD 2 after surgery.  Reports that when she eats, she gets a \"hard lump/pain and can feel it (food) going down by her biggest incision\".  She hasn't been able to eat or drink anything since this morning because she feels she is \"full\" - not digesting food right - gets severe pain and heartburn pretty bad when tries to eat or drink.  Only had a milk and meds this morning.    She is wondering if there is anything she can take at home to get digestion going or gas x or to facilitate a BM.  Requested return call.  Neisha Freeman, MS, RD, RN      Returned patient's call.  She reports that eating and drinking is painful now.   Pain on right side about 1 \" above hard but gets harder with eating and drinking.  Started yesterday afternoon after she ate yogurt - felt uncomfortable.  Pain up to 6/10 after yesterday (prior to yesterday pain was up to 4/10 yadira with plain water).   Always felt like there was too much gas.  She had to lie down for the rest of the day because laying back was the only thing that made it better.  Does she think she got anything stuck? No      Is she taking Zantac 150 BID? Yes.   She hasn't tried anything else to help alleviate the pain except tramadol yesterday.    She's she is super thirsty - plain water hurts.  Could drink Crystal Light out of the little 2 oz. cups directly after surgery but not now.   Trying milk now.   She knows she's not guzzling.   Fluid intake in the last 24 hours - 20-30 oz.     She reports that she had other \"odd\" occurrences last night after she had a small amount ice caused rumbling in stomach at 630 pm.  Randomly her nose started running after but stopped.  Had hiccups for about 1 hour.    Concerned that she has only had 1 BM since surgery.  Feels rumbling a lot in lower pelvis.  Can feel gas go down but then it stops and maybe a little to come out.    No N/V, fever, CP, " SOB, a few times light headed today - yesterday.     Has not H/O of heartburn but not on regular antiacid prior to surgery per H/P.     Will discuss with PA-C and get back to patient today.  Patient verbalized understanding and is agreeable to plan.  Neisha Freeman, MS, RD, RN

## 2018-02-13 NOTE — TELEPHONE ENCOUNTER
Called patient back.  Reviewed her notes mentioned in her call with nurse.  States she feels like she has a bubble on her right side that even after frannie burnps doesn't relieve.  Hurts the most when she drinks plain water.  Water with crystal light is better. Only has gotten in 20-30 oz in the past day.   Has not had a BM since last week.  Passing gas but not a lot.    Discussed with her needs to get IVF.  Will see if she can come in tomorrow and f/u for post op.  Also recommended she have 4 oz of sugar free prune juice with 4 oz of water.  If this does not help can try MOM.  If that doesn't help should contact clinic.    Denies any nausea No vomitting No chest pain No tachycardia.    Has been feeling hungry. Has not done many protein shakes.

## 2018-02-13 NOTE — TELEPHONE ENCOUNTER
Spoke with YOSHI Puentes:  Order 2 days standard IVF's at least to have one scheduled around clinic appointment here.    She will see YOSHI in clinic tomorrow at 1430 and have IVF's at [facility] at 1230 tomorrow.  She will have second day of IVF's 2/16/18 at [facility] at 0930.    Patient notified of times and places of appointments.  Patient verbalized understanding and is agreeable to plan.    Also okay 'd regular prune juice in place of SG or light due to no SF or light available near her.  She will take tonight after her daughter's appointment.  Neisha Freeman, MS, RD, RN

## 2018-02-14 ENCOUNTER — INFUSION THERAPY VISIT (OUTPATIENT)
Dept: INFUSION THERAPY | Facility: CLINIC | Age: 33
End: 2018-02-14
Attending: PHYSICIAN ASSISTANT
Payer: COMMERCIAL

## 2018-02-14 ENCOUNTER — HOSPITAL ENCOUNTER (OUTPATIENT)
Facility: CLINIC | Age: 33
Setting detail: SPECIMEN
Discharge: HOME | End: 2018-02-14
Attending: PHYSICIAN ASSISTANT | Admitting: PHYSICIAN ASSISTANT
Payer: COMMERCIAL

## 2018-02-14 ENCOUNTER — OFFICE VISIT (OUTPATIENT)
Dept: SURGERY | Facility: CLINIC | Age: 33
End: 2018-02-14
Payer: COMMERCIAL

## 2018-02-14 VITALS
SYSTOLIC BLOOD PRESSURE: 107 MMHG | HEART RATE: 101 BPM | TEMPERATURE: 97.2 F | DIASTOLIC BLOOD PRESSURE: 70 MMHG | RESPIRATION RATE: 16 BRPM

## 2018-02-14 VITALS
WEIGHT: 229.8 LBS | DIASTOLIC BLOOD PRESSURE: 67 MMHG | SYSTOLIC BLOOD PRESSURE: 108 MMHG | OXYGEN SATURATION: 95 % | TEMPERATURE: 97.5 F | RESPIRATION RATE: 16 BRPM | HEART RATE: 94 BPM | BODY MASS INDEX: 38.24 KG/M2

## 2018-02-14 DIAGNOSIS — E66.9 OBESITY (BMI 35.0-39.9 WITHOUT COMORBIDITY): ICD-10-CM

## 2018-02-14 DIAGNOSIS — K21.9 GERD WITHOUT ESOPHAGITIS: ICD-10-CM

## 2018-02-14 DIAGNOSIS — R10.13 EPIGASTRIC ABDOMINAL PAIN: Primary | ICD-10-CM

## 2018-02-14 DIAGNOSIS — E66.9 OBESITY (BMI 30-39.9): ICD-10-CM

## 2018-02-14 DIAGNOSIS — K91.2 POSTOPERATIVE MALABSORPTION: ICD-10-CM

## 2018-02-14 DIAGNOSIS — E86.0 DEHYDRATION: ICD-10-CM

## 2018-02-14 DIAGNOSIS — Z98.84 BARIATRIC SURGERY STATUS: ICD-10-CM

## 2018-02-14 DIAGNOSIS — E66.01 MORBID OBESITY (H): ICD-10-CM

## 2018-02-14 DIAGNOSIS — Z98.84 BARIATRIC SURGERY STATUS: Primary | ICD-10-CM

## 2018-02-14 DIAGNOSIS — K91.2 POSTSURGICAL MALABSORPTION: ICD-10-CM

## 2018-02-14 LAB
ANION GAP SERPL CALCULATED.3IONS-SCNC: 9 MMOL/L (ref 3–14)
CHLORIDE SERPL-SCNC: 102 MMOL/L (ref 94–109)
CO2 SERPL-SCNC: 22 MMOL/L (ref 20–32)
POTASSIUM SERPL-SCNC: 3.6 MMOL/L (ref 3.4–5.3)
SODIUM SERPL-SCNC: 133 MMOL/L (ref 133–144)

## 2018-02-14 PROCEDURE — 99024 POSTOP FOLLOW-UP VISIT: CPT | Performed by: PHYSICIAN ASSISTANT

## 2018-02-14 PROCEDURE — 25000125 ZZHC RX 250: Performed by: PHYSICIAN ASSISTANT

## 2018-02-14 PROCEDURE — 96374 THER/PROPH/DIAG INJ IV PUSH: CPT

## 2018-02-14 PROCEDURE — 96375 TX/PRO/DX INJ NEW DRUG ADDON: CPT

## 2018-02-14 PROCEDURE — 80051 ELECTROLYTE PANEL: CPT | Performed by: PHYSICIAN ASSISTANT

## 2018-02-14 PROCEDURE — 99207 ZZC NO CHARGE NURSE ONLY: CPT

## 2018-02-14 PROCEDURE — 25000128 H RX IP 250 OP 636: Performed by: PHYSICIAN ASSISTANT

## 2018-02-14 PROCEDURE — 96361 HYDRATE IV INFUSION ADD-ON: CPT

## 2018-02-14 RX ORDER — OMEPRAZOLE 20 MG/1
20 TABLET, DELAYED RELEASE ORAL DAILY
Qty: 30 TABLET | Refills: 2 | Status: SHIPPED | OUTPATIENT
Start: 2018-02-14 | End: 2018-05-14

## 2018-02-14 RX ADMIN — PANTOPRAZOLE SODIUM 40 MG: 40 INJECTION, POWDER, FOR SOLUTION INTRAVENOUS at 13:12

## 2018-02-14 RX ADMIN — SODIUM CHLORIDE, POTASSIUM CHLORIDE, SODIUM LACTATE AND CALCIUM CHLORIDE 1000 ML: 600; 310; 30; 20 INJECTION, SOLUTION INTRAVENOUS at 14:10

## 2018-02-14 RX ADMIN — ONDANSETRON HYDROCHLORIDE 4 MG: 2 INJECTION, SOLUTION INTRAVENOUS at 13:09

## 2018-02-14 RX ADMIN — ASCORBIC ACID, VITAMIN A PALMITATE, CHOLECALCIFEROL, THIAMINE HYDROCHLORIDE, RIBOFLAVIN-5 PHOSPHATE SODIUM, PYRIDOXINE HYDROCHLORIDE, NIACINAMIDE, DEXPANTHENOL, ALPHA-TOCOPHEROL ACETATE, VITAMIN K1, FOLIC ACID, BIOTIN, CYANOCOBALAMIN: 200; 3300; 200; 6; 3.6; 6; 40; 15; 10; 150; 600; 60; 5 INJECTION, SOLUTION INTRAVENOUS at 13:08

## 2018-02-14 NOTE — MR AVS SNAPSHOT
After Visit Summary   2/14/2018    Geneva Cooley    MRN: [id number]           Patient Information     Date Of Birth          1985        Visit Information        Provider Department      2/14/2018 12:30 PM  INFUSION CHAIR 10 [facility] [place]        Today's Diagnoses     Bariatric surgery status    -  1    Dehydration        Morbid obesity (H)           Follow-ups after your visit        Your next 10 appointments already scheduled     Feb 16, 2018  9:30 AM CST   Return Visit with NURSE ONLY CANCER CENTER   [facility] ([facility]    [id number] [place] [address]   [phone]            Feb 19, 2018  1:00 PM CST   Post Op with Vero Garrett Rn, RN   [facility] ([facility])    [address] [place] [id number] [phone]            Feb 19, 2018  1:30 PM CST   Post Op with Vero Garrett Diet 1, RD   [facility] ([facility])    [address] [place] [address] [phone]            Mar 06, 2018  9:30 AM CST   Post Op with Vero Garrett Rn, RN   [facility] ([facility])    [address] [place] [id number] [phone]              Future tests that were ordered for you today     Open Future Orders        Priority Expected Expires Ordered    US Abdomen Limited Routine 2/15/2018 2/14/2019 2/14/2018            Who to contact     If you have questions or need follow up information about today's clinic visit or your schedule please contact [facility] directly at [phone].  Normal or non-critical lab and imaging results will be communicated to you by MyChart, letter or phone within 4 business days after the clinic has received the results. If you do not  hear from us within 7 days, please contact the clinic through [email or web address] or phone. If you have a critical or abnormal lab result, we will notify you by phone as soon as possible.  Submit refill requests through [email or web address] or call your pharmacy and they will forward the refill request to us. Please allow 3 business days for your refill to be completed.          Additional Information About Your Visit        [email or web address] Information     [email or web address] gives you secure access to your electronic health record. If you see a primary care provider, you can also send messages to your care team and make appointments. If you have questions, please call your primary care clinic.  If you do not have a primary care provider, please call [phone] and they will assist you.        Care EveryWhere ID     This is your Care EveryWhere ID. This could be used by other organizations to access your [facility] medical records  [id number]        Your Vitals Were     Pulse Temperature Respirations Last Period          101 97.2  F (36.2  C) (Oral) 16 01/15/2018         Blood Pressure from Last 3 Encounters:   02/14/18 108/67   02/14/18 107/70   02/07/18 138/89    Weight from Last 3 Encounters:   02/14/18 104.2 kg (229 lb 12.8 oz)   02/05/18 108.6 kg (239 lb 6.7 oz)   01/26/18 109.6 kg (241 lb 9.6 oz)              We Performed the Following     Electrolyte panel          Today's Medication Changes          These changes are accurate as of 2/14/18  4:34 PM.  If you have any questions, ask your nurse or doctor.               Start taking these medicines.        Dose/Directions    omeprazole 20 MG tablet   Commonly known as:  priLOSEC OTC   Used for:  Bariatric surgery status, GERD without esophagitis   Started by:  Deloris Rome PA-C        Dose:  20 mg   Take 1 tablet (20 mg) by mouth daily   Quantity:  30 tablet   Refills:  2            Where to get your medicines      These medications were sent to [id number] - [place], [place] - [address]  [facility]  [address] [facility] [address]    Hours:  test Rx sent successfully 12/26/02  KR Phone:  [phone]     omeprazole 20 MG tablet                Primary Care Provider Office Phone # Fax #    CLAIRE Luna [phone] [address] [address]        Equal Access to Services     [facility] Hospital[facility]ILLIAM : Hadii aad ku hadasho Soomaali, waaxda luqadaha, qaybta kaalmada adeegyada, waxay idiin hayaan adeeg kharash la'aan . So [facility] [phone].    ATENCIÓN: Si rupala espkwan, tiene a tyler disposición servicios gratuitos de asistencia lingüística. Llame al [phone].    We comply with applicable federal civil rights laws and [place] laws. We do not discriminate on the basis of race, color, national origin, age, disability, sex, sexual orientation, or gender identity.            Thank you!     Thank you for choosing [facility]  for your care. Our goal is always to provide you with excellent care. Hearing back from our patients is one way we can continue to improve our services. Please take a few minutes to complete the written survey that you may receive in the mail after your visit with us. Thank you!             Your Updated Medication List - Protect others around you: Learn how to safely use, store and throw away your medicines at www.disposemymeds.org.          This list is accurate as of 2/14/18  4:34 PM.  Always use your most recent med list.                   Brand Name Dispense Instructions for use Diagnosis    etonogestrel-ethinyl estradiol 0.12-0.015 MG/24HR vaginal ring    NUVARING     Place 1 each vaginally every 21 days Place 1 ring every 21 days then remove for 1 week. Needs appointment for further refills        omeprazole 20 MG tablet    priLOSEC OTC    30 tablet    Take 1 tablet (20 mg) by mouth daily    Bariatric surgery status, GERD without esophagitis       ondansetron 4 MG ODT tab    ZOFRAN-ODT    20  tablet    Take 1 tablet (4 mg) by mouth every 6 hours as needed for nausea or vomiting    Morbid obesity with BMI of 40.0-44.9, adult (H)       ranitidine 150 MG tablet    ZANTAC    60 tablet    Take 1 tablet (150 mg) by mouth 2 times daily    Morbid obesity with BMI of 40.0-44.9, adult (H)       traMADol 50 MG tablet    ULTRAM    30 tablet    Take 1-2 tablets ( mg) by mouth every 6 hours as needed    Morbid obesity with BMI of 40.0-44.9, adult (H)       VIACTIV PO      Take 1 chew tab by mouth daily as needed

## 2018-02-14 NOTE — PATIENT INSTRUCTIONS
Can take tylenol 1000 mg twice daily and apply heat for abdominal muscle pain  Rx for omeprazole 20 mg provided. - Continue with zantac twice daily for the next week. If not better or worse to contact clinic  Strive to drink 64 oz of fluid daily. - Is scheduled for 2nd IVF but if she gets 50+ oz in daily on her own will not need.    Strive to move hourly while awake to decrease risk of developing a blood clot.  Continue to do deep breathing exercises twice daily or use your spirometer.  Follow up with your primary care provider to discuss obesity related conditions by one month post op.   Start recommended postoperative vitamins within in the first 6 weeks.  Advance diet per Dietitian at your 2 week appointment.   Make follow up appointment to meet with psychologist at 1 and 3 months post operatively.   Wear binder to support abdominal muscles and gradually wean off over the next few weeks.   Return to clinic for 2 wk post op appointment and bring post op vitamins along.  Call with questions or concerns at any time.

## 2018-02-14 NOTE — PROGRESS NOTES
Infusion Nursing Note:  Geneva Cooley presents today for labs, IVF.    Patient seen by provider today: No   present during visit today: Not Applicable.    Note: N/A.    Intravenous Access:  Labs drawn without difficulty.  Peripheral IV placed.    Treatment Conditions:  Lab Results   Component Value Date     02/14/2018                   Lab Results   Component Value Date    POTASSIUM 3.6 02/14/2018           No results found for: MAG         Lab Results   Component Value Date    CR 0.49 02/05/2018                   Lab Results   Component Value Date    PAYAM 8.4 01/26/2018                Lab Results   Component Value Date    BILITOTAL 0.2 07/03/2017           Lab Results   Component Value Date    ALBUMIN 3.2 07/03/2017                    Lab Results   Component Value Date    ALT 24 07/03/2017           Lab Results   Component Value Date    AST 16 07/03/2017       Results reviewed, labs MET treatment parameters, ok to proceed with treatment.      Post Infusion Assessment:  Patient tolerated infusion without incident.  Site patent and intact, free from redness, edema or discomfort.  No evidence of extravasations.  Access discontinued per protocol.    Discharge Plan:   AVS to patient via MYCHART.  Patient will return prn for next appointment.   Patient discharged in stable condition accompanied by: daughter.  Departure Mode: Ambulatory.    Mynor Hall RN

## 2018-02-14 NOTE — MR AVS SNAPSHOT
After Visit Summary   2/14/2018    Geneva Cooley    MRN: [id number]           Patient Information     Date Of Birth          1985        Visit Information        Provider Department      2/14/2018 2:30 PM Rn, Vero Garrett RN [facility]      Today's Diagnoses     Epigastric abdominal pain    -  1    Postoperative malabsorption        Bariatric surgery status        Obesity (BMI 30-39.9)           Follow-ups after your visit        Your next 10 appointments already scheduled     Feb 19, 2018  1:00 PM CST   Post Op with Vero Garrett Rn, RN   [facility] ([facility])    [address]  [address]  [place] [address] [phone]            Feb 19, 2018  1:30 PM CST   Post Op with Vero Garrett Diet 1, RD   [facility] ([facility])    [address]  [address]  [place] [address] [phone]            Mar 06, 2018  9:30 AM CST   Post Op with Vero Garrett Rn, RN   [facility] ([facility])    [address]  [address]  [facility] [address] [phone]              Who to contact     If you have questions or need follow up information about today's clinic visit or your schedule please contact [facility] directly at [phone].  Normal or non-critical lab and imaging results will be communicated to you by MyChart, letter or phone within 4 business days after the clinic has received the results. If you do not hear from us within 7 days, please contact the clinic through MyChart or phone. If you have a critical or abnormal lab result, we will notify you by phone as soon as possible.  Submit refill requests through HoverWind or call your pharmacy and they will forward the refill request to us. Please allow 3  business days for your refill to be completed.          Additional Information About Your Visit        MyChart Information     [employer] Technologies[employer] gives you secure access to your electronic health record. If you see a primary care provider, you can also send messages to your care team and make appointments. If you have questions, please call your primary care clinic.  If you do not have a primary care provider, please call [phone] and they will assist you.        Care EveryWhere ID     This is your Care EveryWhere ID. This could be used by other organizations to access your [place] medical records  [id number]        Your Vitals Were     Last Period                   01/15/2018            Blood Pressure from Last 3 Encounters:   02/14/18 108/67   02/14/18 107/70   02/07/18 138/89    Weight from Last 3 Encounters:   02/14/18 229 lb 12.8 oz (104.2 kg)   02/05/18 239 lb 6.7 oz (108.6 kg)   01/26/18 241 lb 9.6 oz (109.6 kg)                 Today's Medication Changes          These changes are accurate as of 2/14/18 11:59 PM.  If you have any questions, ask your nurse or doctor.               Start taking these medicines.        Dose/Directions    omeprazole 20 MG tablet   Commonly known as:  priLOSEC OTC   Used for:  Bariatric surgery status, GERD without esophagitis   Started by:  Deloris Rome PA-C        Dose:  20 mg   Take 1 tablet (20 mg) by mouth daily   Quantity:  30 tablet   Refills:  2            Where to get your medicines      These medications were sent to [id number] - [place], [place] - [address]  [address] [facility] [address]    Hours:  test Rx sent successfully 12/26/02  KR Phone:  [phone]     omeprazole 20 MG tablet                Primary Care Provider Office Phone # Fax #    CLAIRE Luna [facility] [phone] [address] [facility] [facility] [address]        Equal Access to Services     RUBEN CYR AH: ghada Mckenzie  jack oscarrishi galladrooscar medel. So [facility] [phone].    ATENCIÓN: Si heraclio griffin, tiene a tyler disposición servicios gratuitos de asistencia lingüística. Ana Luisa al [phone].    We comply with applicable federal civil rights laws and [place] laws. We do not discriminate on the basis of race, color, national origin, age, disability, sex, sexual orientation, or gender identity.            Thank you!     Thank you for choosing [facility]  for your care. Our goal is always to provide you with excellent care. Hearing back from our patients is one way we can continue to improve our services. Please take a few minutes to complete the written survey that you may receive in the mail after your visit with us. Thank you!             Your Updated Medication List - Protect others around you: Learn how to safely use, store and throw away your medicines at www.disposemymeds.org.          This list is accurate as of 2/14/18 11:59 PM.  Always use your most recent med list.                   Brand Name Dispense Instructions for use Diagnosis    etonogestrel-ethinyl estradiol 0.12-0.015 MG/24HR vaginal ring    NUVARING     Place 1 each vaginally every 21 days Place 1 ring every 21 days then remove for 1 week. Needs appointment for further refills        omeprazole 20 MG tablet    priLOSEC OTC    30 tablet    Take 1 tablet (20 mg) by mouth daily    Bariatric surgery status, GERD without esophagitis       ondansetron 4 MG ODT tab    ZOFRAN-ODT    20 tablet    Take 1 tablet (4 mg) by mouth every 6 hours as needed for nausea or vomiting    Morbid obesity with BMI of 40.0-44.9, adult (H)       ranitidine 150 MG tablet    ZANTAC    60 tablet    Take 1 tablet (150 mg) by mouth 2 times daily    Morbid obesity with BMI of 40.0-44.9, adult (H)       traMADol 50 MG tablet    ULTRAM    30 tablet    Take 1-2 tablets ( mg) by mouth every 6 hours as needed     Morbid obesity with BMI of 40.0-44.9, adult (H)       VIACTIV PO      Take 1 chew tab by mouth daily as needed

## 2018-02-14 NOTE — PROGRESS NOTES
[facility]   1 Week Surgical Follow-Up     PCP:  Jody Coughlin    DOS: 02/05/18  Surgeon: PLB  Surgery Type: Sleeve  HISTORY OF PRESENT ILLNESS:  Geneva Cooley returns today for her follow-up appointment status post bariatric surgery.  She has been taking care of her 7 year old daughter this week so has had to be a bit more active than what she preferred.  She finished using Tramadol pills for pain medication 3 days ago. Took one dose of tylenol yesterday.  Refill Needed: No.  Patient's Pain Scale: 4. The pain is located right side abdomen.  Patient's current daily fluid intake in oz is: Yesterday had 40 oz. 16 ounces Powerade so far today.  Patient has started postoperative Vitamins: No. Had one session of IVF today.  Has been easier to get fluid in than it was yesterday.    Had BM yesterday that did help with her abdominal discomfort.  Still has some pain and feels something hard around right side abdomen near her incision.  It is worse after she eats or drinks.  Lasts for about 3-5 minutes.  No nausea or vomiting.    Activity:   Activity: very active - no planned activity.    REVIEW OF SYSTEMS:  GI:    Nausea: No  Vomiting: No  Diarrhea: No  Constipation: No  Last BM: last  night - mostly formed and brown.  Did not drink prune juice  Dysphagia: Yes - Resolved today  GERD: Yes - normally getting in 2 doses of zantac.  Missed dose today.  Has had burning in chest and woke up at 3 am hungry.    CV/Pulmonary:   Chest Pain: No  Dizzy: No  Light Headed: Yes - sometimes when gets up   SOB: No  Endo:  Diabetes: No  :    Contraception: Nuvaring - out now - Discussed needing 2 forms of birth control if sexually active  Dysuria: No  Vascular:    LE Edema: No  PHYSICAL EXAMINATION:    /67 (BP Location: Left arm, Patient Position: Sitting, Cuff Size: Adult Large)  Pulse 94  Temp 97.5  F (36.4  C)  Resp 16  Wt 229 lb 12.8 oz (104.2 kg)  LMP 01/15/2018  SpO2 95%  Breastfeeding? No   BMI 38.24 kg/m2     GENERAL: No acute distress. Alert and oriented time 3.  HEART: Regular rate and rhythm.  LUNGS:  Clear to auscultation bilaterally.  ABDOMEN: Soft, incisions clean,dry, and intact. Tenderness WNL for post op. No swelling or abnormality noted around right incision  EXTREMITIES: No lower extremity edema bilaterally. No calf swelling or tenderness. Negative lj's  SKIN: No rashes.  PSYCHOLOGICAL: Stable. Pleasant      ASSESSMENT:    1. S/P bariatric surgery.  2. Post surgical malabsorption  3. GERD without esophagitis  4. dehydration    PLAN:   Can take tylenol 1000 mg twice daily and apply heat for abdominal muscle pain  Rx for omeprazole 20 mg provided. - Continue with zantac twice daily for the next week. If not better or worse to contact clinic  Strive to drink 64 oz of fluid daily. - Is scheduled for 2nd IVF but if she gets 50+ oz in daily on her own will not need.    Strive to move hourly while awake to decrease risk of developing a blood clot.  Continue to do deep breathing exercises twice daily or use your spirometer.  Follow up with your primary care provider to discuss obesity related conditions by one month post op.   Start recommended postoperative vitamins within in the first 6 weeks.  Advance diet per Dietitian at your 2 week appointment.   Make follow up appointment to meet with psychologist at 1 and 3 months post operatively.   Wear binder to support abdominal muscles and gradually wean off over the next few weeks.   Return to clinic for 2 wk post op appointment and bring post op vitamins along.  Call with questions or concerns at any time.

## 2018-02-14 NOTE — MR AVS SNAPSHOT
After Visit Summary   2/14/2018    Geneva Cooley    MRN: [id number]           Patient Information     Date Of Birth          1985        Visit Information        Provider Department      2/14/2018 2:50 PM Deloris Rome PA-C [facility]      Today's Diagnoses     Bariatric surgery status    -  1    GERD without esophagitis        Obesity (BMI 35.0-39.9 without comorbidity)        Postsurgical malabsorption          Care Instructions    Can take tylenol 1000 mg twice daily and apply heat for abdominal muscle pain  Rx for omeprazole 20 mg provided. - Continue with zantac twice daily for the next week. If not better or worse to contact clinic  Strive to drink 64 oz of fluid daily. - Is scheduled for 2nd IVF but if she gets 50+ oz in daily on her own will not need.    Strive to move hourly while awake to decrease risk of developing a blood clot.  Continue to do deep breathing exercises twice daily or use your spirometer.  Follow up with your primary care provider to discuss obesity related conditions by one month post op.   Start recommended postoperative vitamins within in the first 6 weeks.  Advance diet per Dietitian at your 2 week appointment.   Make follow up appointment to meet with psychologist at 1 and 3 months post operatively.   Wear binder to support abdominal muscles and gradually wean off over the next few weeks.   Return to clinic for 2 wk post op appointment and bring post op vitamins along.  Call with questions or concerns at any time.          Follow-ups after your visit        Your next 10 appointments already scheduled     Feb 16, 2018  9:30 AM CST   Return Visit with [facility]   [facility] ([facility])    [id number] [place] [address] [phone]            Feb 19, 2018  1:00 PM CST   Post Op with Vero Garrett Rn, RN   [facility] [place] ([facility])    [address]  [address]  [place] [id number] [phone]            Feb 19, 2018  1:30 PM CST   Post Op with Vero Garrett Diet 1, RD   [facility] [place] ([facility])    [address]  [address]  [facility] [address] [phone]            Mar 06, 2018  9:30 AM CST   Post Op with Vero Garrett Rn, RN   [facility] [place] ([facility])    [address]  [address]  [place] [address] [phone]              Future tests that were ordered for you today     Open Future Orders        Priority Expected Expires Ordered    US Abdomen Limited Routine 2/15/2018 2/14/2019 2/14/2018            Who to contact     If you have questions or need follow up information about today's clinic visit or your schedule please contact [facility] directly at [phone].  Normal or non-critical lab and imaging results will be communicated to you by HiperScanhart, letter or phone within 4 business days after the clinic has received the results. If you do not hear from us within 7 days, please contact the clinic through Freepath or phone. If you have a critical or abnormal lab result, we will notify you by phone as soon as possible.  Submit refill requests through Freepath or call your pharmacy and they will forward the refill request to us. Please allow 3 business days for your refill to be completed.          Additional Information About Your Visit        Freepath Information     Freepath gives you secure access to your electronic health record. If you see a primary care provider, you can also send messages to your care team and make appointments. If you have questions, please call your primary care clinic.  If you do not have a primary care provider, please call [phone] and they will assist you.        Care  EveryWhere ID     This is your Care EveryWhere ID. This could be used by other organizations to access your [facility] medical records  [id number]        Your Vitals Were     Pulse Temperature Respirations Last Period Pulse Oximetry Breastfeeding?    94 97.5  F (36.4  C) 16 01/15/2018 95% No    BMI (Body Mass Index)                   38.24 kg/m2            Blood Pressure from Last 3 Encounters:   02/14/18 108/67   02/14/18 107/70   02/07/18 138/89    Weight from Last 3 Encounters:   02/14/18 229 lb 12.8 oz (104.2 kg)   02/05/18 239 lb 6.7 oz (108.6 kg)   01/26/18 241 lb 9.6 oz (109.6 kg)              Today, you had the following     No orders found for display         Today's Medication Changes          These changes are accurate as of 2/14/18  4:09 PM.  If you have any questions, ask your nurse or doctor.               Start taking these medicines.        Dose/Directions    omeprazole 20 MG tablet   Commonly known as:  priLOSEC OTC   Used for:  Bariatric surgery status, GERD without esophagitis   Started by:  Deloris Rome PA-C        Dose:  20 mg   Take 1 tablet (20 mg) by mouth daily   Quantity:  30 tablet   Refills:  2            Where to get your medicines      These medications were sent to [id number] - [place], [place] - [address]  [address] [address]    Hours:  test Rx sent successfully 12/26/02  KR Phone:  [phone]     omeprazole 20 MG tablet                Primary Care Provider Office Phone # Fax #    Jody Jessi Coughlin, CLAIRE [facility] [phone] [address] [address] [place] [address]        Equal Access to Services     RUBEN CYR AH: Hadii barbi Couch, waaxda luqadaha, qaybta kaalmada adeegyada, oscar mata. So [facility] [phone].    ATENCIÓN: Si habla español, tiene a tyler disposición servicios gratuitos de asistencia lingüística. Llame al [phone].    We comply with applicable federal civil  rights laws and [place] laws. We do not discriminate on the basis of race, color, national origin, age, disability, sex, sexual orientation, or gender identity.            Thank you!     Thank you for choosing [facility]  for your care. Our goal is always to provide you with excellent care. Hearing back from our patients is one way we can continue to improve our services. Please take a few minutes to complete the written survey that you may receive in the mail after your visit with us. Thank you!             Your Updated Medication List - Protect others around you: Learn how to safely use, store and throw away your medicines at www.disposemymeds.org.          This list is accurate as of 2/14/18  4:09 PM.  Always use your most recent med list.                   Brand Name Dispense Instructions for use Diagnosis    etonogestrel-ethinyl estradiol 0.12-0.015 MG/24HR vaginal ring    NUVARING     Place 1 each vaginally every 21 days Place 1 ring every 21 days then remove for 1 week. Needs appointment for further refills        omeprazole 20 MG tablet    priLOSEC OTC    30 tablet    Take 1 tablet (20 mg) by mouth daily    Bariatric surgery status, GERD without esophagitis       ondansetron 4 MG ODT tab    ZOFRAN-ODT    20 tablet    Take 1 tablet (4 mg) by mouth every 6 hours as needed for nausea or vomiting    Morbid obesity with BMI of 40.0-44.9, adult (H)       ranitidine 150 MG tablet    ZANTAC    60 tablet    Take 1 tablet (150 mg) by mouth 2 times daily    Morbid obesity with BMI of 40.0-44.9, adult (H)       traMADol 50 MG tablet    ULTRAM    30 tablet    Take 1-2 tablets ( mg) by mouth every 6 hours as needed    Morbid obesity with BMI of 40.0-44.9, adult (H)       VIACTIV PO      Take 1 chew tab by mouth daily as needed

## 2018-02-19 ENCOUNTER — TELEPHONE (OUTPATIENT)
Dept: SURGERY | Facility: CLINIC | Age: 33
End: 2018-02-19

## 2018-02-19 NOTE — TELEPHONE ENCOUNTER
Patient is due for her 1 week PO appointments today at 1300 and 1330.  She doesn't feel she will be able to make it down her due to weather.  She is calling to reschedule.    She states that she didn't need her 2nd IVF tx last week because she is getting in at least at 51 oz fluid daily.  She also states that changing from Zantac 150 mg 2 times daily to Omeprazole 20 mg daily has greatly helped her with her P.O. Intake.    She will be seen in clinic tomorrow at 1100 and 1130 instead of today.    Neisha Freeman, MS, RD, RN

## 2018-02-20 ENCOUNTER — OFFICE VISIT (OUTPATIENT)
Dept: SURGERY | Facility: CLINIC | Age: 33
End: 2018-02-20
Payer: COMMERCIAL

## 2018-02-20 VITALS — WEIGHT: 228.6 LBS | BODY MASS INDEX: 38.09 KG/M2 | HEIGHT: 65 IN

## 2018-02-20 VITALS
DIASTOLIC BLOOD PRESSURE: 70 MMHG | SYSTOLIC BLOOD PRESSURE: 100 MMHG | OXYGEN SATURATION: 95 % | RESPIRATION RATE: 14 BRPM | HEART RATE: 89 BPM | WEIGHT: 228.6 LBS | BODY MASS INDEX: 38.04 KG/M2 | TEMPERATURE: 97.3 F

## 2018-02-20 DIAGNOSIS — E66.9 OBESITY (BMI 35.0-39.9 WITHOUT COMORBIDITY): ICD-10-CM

## 2018-02-20 DIAGNOSIS — Z98.84 BARIATRIC SURGERY STATUS: ICD-10-CM

## 2018-02-20 DIAGNOSIS — E66.9 OBESITY (BMI 30-39.9): Primary | ICD-10-CM

## 2018-02-20 PROCEDURE — 99207 ZZC NO CHARGE NURSE ONLY: CPT

## 2018-02-20 PROCEDURE — 97803 MED NUTRITION INDIV SUBSEQ: CPT | Performed by: DIETITIAN, REGISTERED

## 2018-02-20 NOTE — PROGRESS NOTES
Per RN request went in to see patient regarding concern of bruise on right dorsal hand.  She is 2 weeks PO and noticed today that there was a brownish/red bruise over right dorsal hand.  No pain.  No lumps.      PE:  Both hands - No swelling No lumps.  No tenderness.  Noted two small healing abrasions on left dorsal hand.  Right dorsal hand has a 2mm X 3mm area of ecchymosis and some possible brownish/reddish color.  Light in appearance. No warmth over area when compared to left hand.  No swelling.  No hardness or lumps noted.    Area was marked with ink.      PLAN: Let patient know to watch darkened area.  If the darkness goes outside of marked area to contact clinic as soon as possible.  Or if she develops additional symptoms such as redness, warmth, swelling, fever or other signs of infection to seek medical care.      She verbalized understanding.

## 2018-02-20 NOTE — MR AVS SNAPSHOT
After Visit Summary   2/20/2018    Geneva Cooley    MRN: [id number]           Patient Information     Date Of Birth          1985        Visit Information        Provider Department      2/20/2018 11:00 AM Rn, Vero Garrett, RN [facility]      Today's Diagnoses     Obesity (BMI 30-39.9)    -  1    Bariatric surgery status           Follow-ups after your visit        Your next 10 appointments already scheduled     Mar 06, 2018  9:30 AM CST   Post Op with Vero Garrett Rn, RN   [facility] ([facility] [place] [facility])    [address] [place] [id number] [phone]              Who to contact     If you have questions or need follow up information about today's clinic visit or your schedule please contact [facility] directly at [phone].  Normal or non-critical lab and imaging results will be communicated to you by First Insighthart, letter or phone within 4 business days after the clinic has received the results. If you do not hear from us within 7 days, please contact the clinic through First Insighthart or phone. If you have a critical or abnormal lab result, we will notify you by phone as soon as possible.  Submit refill requests through VisionGate or call your pharmacy and they will forward the refill request to us. Please allow 3 business days for your refill to be completed.          Additional Information About Your Visit        First Insighthart Information     VisionGate gives you secure access to your electronic health record. If you see a primary care provider, you can also send messages to your care team and make appointments. If you have questions, please call your primary care clinic.  If you do not have a primary care provider, please call [phone] and they will assist you.        Care EveryWhere ID     This is your Care EveryWhere ID. This could  be used by other organizations to access your [facility] medical records  [id number]        Your Vitals Were     Pulse Temperature Respirations Pulse Oximetry Breastfeeding? BMI (Body Mass Index)    89 97.3  F (36.3  C) 14 95% No 38.04 kg/m2       Blood Pressure from Last 3 Encounters:   02/20/18 100/70   02/14/18 108/67   02/14/18 107/70    Weight from Last 3 Encounters:   02/20/18 228 lb 9.6 oz (103.7 kg)   02/20/18 228 lb 9.6 oz (103.7 kg)   02/14/18 229 lb 12.8 oz (104.2 kg)              Today, you had the following     No orders found for display       Primary Care Provider Office Phone # Fax #    Jody CLAIRE Champagne [phone] [address] [facility] [address]        Equal Access to Services     [facility] Hospital[facility]ILLIAM : Hadii aad ku hadasho Soomaali, waaxda luqadaha, qaybta kaalmada adeegyada, waxay morganin hayelinan cj cárdenas . So [facility] [phone].    ATENCIÓN: Si habla español, tiene a tyler disposición servicios gratuitos de asistencia lingüística. Ana Luisa al [phone].    We comply with applicable federal civil rights laws and [place] laws. We do not discriminate on the basis of race, color, national origin, age, disability, sex, sexual orientation, or gender identity.            Thank you!     Thank you for choosing [facility]  for your care. Our goal is always to provide you with excellent care. Hearing back from our patients is one way we can continue to improve our services. Please take a few minutes to complete the written survey that you may receive in the mail after your visit with us. Thank you!             Your Updated Medication List - Protect others around you: Learn how to safely use, store and throw away your medicines at www.disposemymeds.org.          This list is accurate as of 2/20/18  2:37 PM.  Always use your most recent med list.                   Brand Name Dispense Instructions for use Diagnosis     etonogestrel-ethinyl estradiol 0.12-0.015 MG/24HR vaginal ring    NUVARING     Place 1 each vaginally every 21 days Place 1 ring every 21 days then remove for 1 week. Needs appointment for further refills        multivitamin  peds with iron 60 MG chewable tablet      Take 2 chew tab by mouth 2 times daily        omeprazole 20 MG tablet    priLOSEC OTC    30 tablet    Take 1 tablet (20 mg) by mouth daily    Bariatric surgery status, GERD without esophagitis       ondansetron 4 MG ODT tab    ZOFRAN-ODT    20 tablet    Take 1 tablet (4 mg) by mouth every 6 hours as needed for nausea or vomiting    Morbid obesity with BMI of 40.0-44.9, adult (H)       traMADol 50 MG tablet    ULTRAM    30 tablet    Take 1-2 tablets ( mg) by mouth every 6 hours as needed    Morbid obesity with BMI of 40.0-44.9, adult (H)       TYLENOL PO      Take 1,000 mg by mouth 2 times daily        VIACTIV PO      Take 1 chew tab by mouth 3 times daily (with meals)        VITAMIN D (CHOLECALCIFEROL) PO      Take 5,000 Units by mouth daily

## 2018-02-20 NOTE — PROGRESS NOTES
[facility]  2 Week Surgical Follow-Up     Current PCP:  Jody Coughlin  Surgeon: KEYANNA  HISTORY OF PRESENT ILLNESS:  Geneva Cooley returns today for her follow-up appointment status post Surgery Type: Sleeve DOS: 02/05/18.  She is accompanied by Self. Her daily fluid intake in oz is: 48 oz. of water and Premier Protien drinks.  Feels well emotionally Yes.   Patient reports using: Alcohol - No     Cigarettes - No  Pain:    She is currently using Pain Meds: Tylenol 1000 mg 2 times daily for pain relief. She rates her pain at a Pain Scale: 3 - 6 depending on level of activity. The pain is located right side of abdomen.  Refill Needed: No  Activity:  The patient is considered a fall risk: No.   What are you doing for physical activity? Activity: very active with young daugter but no planned activity  Patient plans to use DVD's and weights she has around the house and then find a gym she can afford.    Review of Systems:   GI:  Nausea: NoVomiting: No occurs   GERD: No - was changed to Omeprazole 20 mg daily on 2/14/18 however pharmacy sent out Omeprazole 20 mg 2 times daily that patient was taking. She will go down to prescribed amount and let us know if symptoms should arise.  Diarrhea: No  Patient's Last BM: Saturday 2/17/18 was semi-formed and yellow brown in color. Reports BM's about every other day. .       Neuro/CV/Pulmonary:   Dizzy: No    Light Headed: No   SOB: No   Chest Pain: No   Vascular:    LE Edema: No  Melanie's Negative  :  Form of birth control is Contraception: Nuvaring out  Symptoms of UTI:  Dysuria: No  Endo: Diabetes: No  BS check (freq): na                  Avg. BS Level: na  PHYSICAL EXAMINATION:    VITALS:  /70 (BP Location: Left arm, Patient Position: Sitting, Cuff Size: Adult Large)  Pulse 89  Temp 97.3  F (36.3  C)  Resp 14  Wt 228 lb 9.6 oz (103.7 kg)  SpO2 95%  Breastfeeding? No  BMI 38.04 kg/m2                    LUNGS:  clear to  auscultation  ABDOMEN: Abdomen soft, non-tender. BS normal. No masses, organomegaly  INCISION: dry and intact    Patient complained of red area on top of right hand. Assessed by YOSHI Puentes - please refer to her note from today.     MEDICATIONS/VITAMINS/ALLERGIES REVIEWED: Yes  ASA Hx: No    ASSESSMENT:    1.  DOS: 02/05/18 status post gastric sleeve surgery.    Steri strip removed?  Yes       PLAN:    Make follow up appointment to meet with psychologist with in 3 months post operatively.     Follow up with your primary care provider to obesity related conditions by one month post op.     Advance diet per Dietitian.     Strive to drink 64 oz of fluid daily.    Call with questions or concerns at any time.    Return to clinic in 4 weeks for nutrition visit.    Return to clinic postop month 3.    Guidelines provided re: how to increase activity/exercise?  Yes

## 2018-02-20 NOTE — PROGRESS NOTES
"BARIATRIC PROGRESS NOTE - 2 Week Post Op  DATE OF VISIT: February 20, 2018    Geneva Cooley  1985  female  [id number]  32 year old    ASSESSMENT:    REASON FOR VISIT:  Geneva Cooley is a 32 year old year old female presents today for a 2 Week Post Op nutrition follow-up appointment. Patient is accompanied by self      DIAGNOSIS:  Status: post gastric bypass surgery.  Obesity:  Obesity Grade II BMI 35-39.9    ANTHROPOMETRICS:  Height: 165.1 cm (5' 5\")  Initial weight: 252 lbs    Current Weight: 103.7 kg (228 lb 9.6 oz)  BMI: 38.12 kg/(m^2).    VITAMINS AND MINERALS:   2 Multivitamin with Minerals  500 mg Calcium With Vitamin D TID  Daily (pt unsure of dose) International units Vitamin D  (none, yet) mcg Vitamin B-12 sublingual  (none yet) mg Iron    NUTRITION HISTORY:  Tolerating diet: Bariatric full liquid diet  Fluids/water intake: 48 ounces/day  Milk intake: 0 ounces/day (16oz protein drink)  Small bites: Yes  Portion size: 1/2c or less  20-30 minute meals: Yes  Fluids and meals  by 30 minutes: Yes  Chew foods thoroughly: Yes  Breakfast: yogurt   Lunch: cream of chicken soup (strained), broth, yogurt   Dinner: (same as lunch)  Snacks: protein shake  Nausea: none  Vomiting: once after eating too fast  Additional Information: Pt overall feeling well however states she has many moments of \"'s remorse\" where she regrets having the surgery. Worried about the possibility of never having certain foods again. Discussed that these feelings are normal and generally get easier to manage once initial recovery period has passed and especially once beyond first few diet advancement phases.       PHYSICAL ACTIVITY:  None    DIAGNOSIS:   Current Nutrition Diagnosis: Altered gastrointestinal function related to alternation in gastrointestinal structure as evidenced by history of gastric bypass.     INTERVENTION  Nutrition Prescription: Recommended bariatric puree diet.    Goals:  Start puree diet at day 14 post " op.  Continue MVI, calcium with vitamin D vitamin D  Start Vitamin B12 and Iron  Aim for 60 to 90 grams protein.  Continue 48 to 64 oz of fluid per day.    Implementation:  Discussed transition to puree diet.  Emphasized importance of adequate protein.  Reviewed required vitamins and mineral supplements.  Verbalizes good understanding of surgery diet guidelines.  Assessed learning needs and learning preferences.      NUTRITION MONITORING AND EVALUATION:   Monitor diet tolerance and weight loss.      Anticipated Compliance: good  Follow Up: Continue to monitor patient closely regarding weight loss and diet.  At 4 weeks Post Op    TIME SPENT WITH PATIENT: 40 minutes.    Shilpa Gonzalez RD, LD  Clinical Dietitian

## 2018-02-20 NOTE — MR AVS SNAPSHOT
MRN:[id number]                      After Visit Summary   2/20/2018    Geneva Cooley    MRN: [id number]           Visit Information        Provider Department      2/20/2018 11:30 AM Vero Armstrong RD [facility]      Your next 10 appointments already scheduled     Mar 06, 2018  9:30 AM CST   Post Op with Vero Garrett Rn, RN   [facility] ([facility])    [address] [place] [address] [phone]              MyChart Information     [employer] gives you secure access to your electronic health record. If you see a primary care provider, you can also send messages to your care team and make appointments. If you have questions, please call your primary care clinic.  If you do not have a primary care provider, please call [phone] and they will assist you.        Care EveryWhere ID     This is your Care EveryWhere ID. This could be used by other organizations to access your [facility] medical records  [id number]        Equal Access to Services     RUBEN CYR : Hadii aad ku hadasho Soaravind, waaxda luqadaha, qaybta kaalmada adeendy, oscar cárdenas . So [facility] [phone].    ATENCIÓN: Si habla español, tiene a tyler disposición servicios gratuitos de asistencia lingüística. Llame al [phone].    We comply with applicable federal civil rights laws and [place] laws. We do not discriminate on the basis of race, color, national origin, age, disability, sex, sexual orientation, or gender identity.

## 2018-03-13 ENCOUNTER — OFFICE VISIT (OUTPATIENT)
Dept: SURGERY | Facility: CLINIC | Age: 33
End: 2018-03-13
Payer: COMMERCIAL

## 2018-03-13 VITALS
RESPIRATION RATE: 16 BRPM | SYSTOLIC BLOOD PRESSURE: 118 MMHG | OXYGEN SATURATION: 97 % | DIASTOLIC BLOOD PRESSURE: 62 MMHG | WEIGHT: 222.5 LBS | TEMPERATURE: 97 F | HEART RATE: 62 BPM | BODY MASS INDEX: 37.03 KG/M2

## 2018-03-13 VITALS — HEIGHT: 65 IN | WEIGHT: 222.8 LBS | BODY MASS INDEX: 37.12 KG/M2

## 2018-03-13 DIAGNOSIS — E66.9 OBESITY (BMI 35.0-39.9 WITHOUT COMORBIDITY): ICD-10-CM

## 2018-03-13 DIAGNOSIS — Z98.84 BARIATRIC SURGERY STATUS: ICD-10-CM

## 2018-03-13 DIAGNOSIS — Z98.84 BARIATRIC SURGERY STATUS: Primary | ICD-10-CM

## 2018-03-13 PROCEDURE — 97803 MED NUTRITION INDIV SUBSEQ: CPT | Performed by: DIETITIAN, REGISTERED

## 2018-03-13 PROCEDURE — 99024 POSTOP FOLLOW-UP VISIT: CPT | Performed by: PHYSICIAN ASSISTANT

## 2018-03-13 NOTE — MR AVS SNAPSHOT
After Visit Summary   3/13/2018    Geneva Cooley    MRN: [id number]           Patient Information     Date Of Birth          1985        Visit Information        Provider Department      3/13/2018 11:00 AM Deloris Rome PA-C [facility]      Today's Diagnoses     Bariatric surgery status    -  1       Follow-ups after your visit        Who to contact     If you have questions or need follow up information about today's clinic visit or your schedule please contact [facility] directly at [phone].  Normal or non-critical lab and imaging results will be communicated to you by Swarmhart, letter or phone within 4 business days after the clinic has received the results. If you do not hear from us within 7 days, please contact the clinic through happyviewt or phone. If you have a critical or abnormal lab result, we will notify you by phone as soon as possible.  Submit refill requests through 1Rebel or call your pharmacy and they will forward the refill request to us. Please allow 3 business days for your refill to be completed.          Additional Information About Your Visit        MyChart Information     1Rebel gives you secure access to your electronic health record. If you see a primary care provider, you can also send messages to your care team and make appointments. If you have questions, please call your primary care clinic.  If you do not have a primary care provider, please call [phone] and they will assist you.        Care EveryWhere ID     This is your Care EveryWhere ID. This could be used by other organizations to access your [facility] medical records  [id number]        Your Vitals Were     Pulse Temperature Respirations Pulse Oximetry BMI (Body Mass Index)       62 97  F (36.1  C) 16 97% 37.03 kg/m2        Blood Pressure from Last 3 Encounters:   03/13/18 118/62    02/20/18 100/70   02/14/18 108/67    Weight from Last 3 Encounters:   03/13/18 222 lb 8 oz (100.9 kg)   03/13/18 222 lb 12.8 oz (101.1 kg)   02/20/18 228 lb 9.6 oz (103.7 kg)              Today, you had the following     No orders found for display       Primary Care Provider Office Phone # Fax CLAIRE Cuellar [facility] [phone] [address] [address] [place] [address]        Equal Access to Services     [facility]: Hadii aad ku hadasho Soomaali, waaxda luqadaha, qaybta kaalmada adeegyada, oscar cárdenas . So [facility] [phone].    ATENCIÓN: Si habla español, tiene a tyler disposición servicios gratuitos de asistencia lingüística. Ll[facility]dena [facility] [phone].    We comply with applicable federal civil rights laws and [place] laws. We do not discriminate on the basis of race, color, national origin, age, disability, sex, sexual orientation, or gender identity.            Thank you!     Thank you for choosing [facility]  for your care. Our goal is always to provide you with excellent care. Hearing back from our patients is one way we can continue to improve our services. Please take a few minutes to complete the written survey that you may receive in the mail after your visit with us. Thank you!             Your Updated Medication List - Protect others around you: Learn how to safely use, store and throw away your medicines at www.disposemymeds.org.          This list is accurate as of 3/13/18  1:23 PM.  Always use your most recent med list.                   Brand Name Dispense Instructions for use Diagnosis    etonogestrel-ethinyl estradiol 0.12-0.015 MG/24HR vaginal ring    NUVARING     Place 1 each vaginally every 21 days Place 1 ring every 21 days then remove for 1 week. Needs appointment for further refills        multivitamin  peds with iron 60 MG chewable tablet      Take 2 chew tab by mouth 2 times daily         MULTIVITAMIN ADULT PO      Take 1 capsule by mouth daily        omeprazole 20 MG tablet    priLOSEC OTC    30 tablet    Take 1 tablet (20 mg) by mouth daily    Bariatric surgery status, GERD without esophagitis       ondansetron 4 MG ODT tab    ZOFRAN-ODT    20 tablet    Take 1 tablet (4 mg) by mouth every 6 hours as needed for nausea or vomiting    Morbid obesity with BMI of 40.0-44.9, adult (H)       traMADol 50 MG tablet    ULTRAM    30 tablet    Take 1-2 tablets ( mg) by mouth every 6 hours as needed    Morbid obesity with BMI of 40.0-44.9, adult (H)       TYLENOL PO      Take 1,000 mg by mouth 2 times daily        VIACTIV PO      Take 1 chew tab by mouth 3 times daily (with meals)        VITAMIN D (CHOLECALCIFEROL) PO      Take 5,000 Units by mouth daily

## 2018-03-13 NOTE — PROGRESS NOTES
[facility]   4 Week Surgical Follow-Up      PCP:  Jody Coughlin    DOS: 03/13/2018  Surgeon: PLB  Surgery Type: Sleeve  HISTORY OF PRESENT ILLNESS:  Geneva Cooley returns today for her follow-up appointment status post bariatric surgery concerned about a stitch the is visible on the right side of her abdomen.  It has been there a while.  No pain.  Does get caught.  Would like it removed.       /62  Pulse 62  Temp 97  F (36.1  C)  Resp 16  Wt 222 lb 8 oz (100.9 kg)  SpO2 97%  BMI 37.03 kg/m2  General :  NAD  Abdomen:  Soft, non-tender.  Incisions clean/dry/intact.  The lower right side incision does have a stitch visible.  NO redness, NO swelling, NO red streaks, No drainage.  NO signs of infection.  The area was cleansed with alcohol and using a sterile suture removal kit the stitch was easily removed.    PLAN:  Suture removal of non-dissolving stitch.  Patient to follow up as needed.

## 2018-03-13 NOTE — PROGRESS NOTES
"BARIATRIC PROGRESS NOTE - 4 Week Post Op  DATE OF VISIT: March 13, 2018    Geneva Cooley  1985  female  [id number]  32 year old    ASSESSMENT:    REASON FOR VISIT:  Geneva Cooley is a 32 year old year old female presents today for a 4 Week Post Op nutrition follow-up appointment. Patient is accompanied by self      DIAGNOSIS:  Status: post gastric sleeve surgery.  Obesity:  Obesity Grade II BMI 35-39.9    ANTHROPOMETRICS:  Height: 165.1 cm (5' 5\")  Initial weight: 252 lbs    Current Weight: 101.1 kg (222 lb 12.8 oz)  BMI: 37.15 kg/(m^2).    VITAMINS AND MINERALS:    1 Multivitamin with Minerals (1 dose meets guidelines/bariatric specific)   500 mg Calcium With Vitamin D TID (separate from multivitamin and Vitron C)  5000 International units Vitamin D  5000 mcg Vitamin B-12 sublingual; however has not yet started taking; recommended q5d  Daily Vitron C    NUTRITION HISTORY:  Tolerating diet: Bariatric pureed diet  Fluids/water intake: 40oz ounces/day (water, protein water, crystal light, Fair Life milk ]  Milk intake: rare  Small bites: Yes  Portion size: 1/2-1 cup  20-30 minute meals: Yes  Fluids and meals  by 30 minutes: Yes  Chew foods thoroughly: Yes  Breakfast: oatmeal OR protein drink OR yogurt OR unsweetened applesauce   Lunch: unsweetened applesauce OR meatloaf   Dinner: (same as lunch), mashed potatoes, TV dinner steak and potatoes   Snacks: protein drinks  Nausea: occasional   Vomiting: rare  Additional Information: Pt overall feeling well, still struggling sometimes to meet fluid goals. Discussed introducing more variety into the diet and avoiding high fat items.       PHYSICAL ACTIVITY:  Type: walking; aims for 13963 steps/day    DIAGNOSIS:   Previous Nutrition Diagnosis: Altered gastrointestinal function related to alternation in gastrointestinal structure as evidenced by history of gastric sleeve.   No change    Previous Goals:  Start puree diet at day 14 post op. - met  Continue MVI, " calcium with vitamin D vitamin D - met  Start Vitamin B12 and Iron - partially met  Aim for 60 to 90 grams protein. - improving  Continue 48 to 64 oz of fluid per day. - improving    Current Nutrition Diagnosis: Altered gastrointestinal function related to alternation in gastrointestinal structure as evidenced by history of gastric sleeve.     INTERVENTION  Nutrition Prescription: Recommended bariatric soft diet.    Goals:  Start soft diet at day 28 post op.  Continue MVI, calcium with vitamin D, vitamin B12, vitamin D, and  iron with vitamin C.  Aim for 60 to 90 grams protein.  Continue 48 to 64 oz of fluid per day.    Implementation:  Discussed transition to soft diet.  Emphasized importance of adequate protein.  Reviewed required vitamins and mineral supplements.  Verbalizes fair-good understanding of surgery diet guidelines.  Assessed learning needs and learning preferences.      NUTRITION MONITORING AND EVALUATION:   Monitor diet tolerance and weight loss.      Anticipated Compliance: fair-good  Follow Up: Continue to monitor patient closely regarding weight loss and diet.  At 3 months Post Op    TIME SPENT WITH PATIENT: 35 minutes.      Shilpa Gonzalez RD, LD  Clinical Dietitian

## 2018-04-05 ENCOUNTER — TELEPHONE (OUTPATIENT)
Dept: SURGERY | Facility: CLINIC | Age: 33
End: 2018-04-05

## 2018-04-05 NOTE — TELEPHONE ENCOUNTER
Pt has opportunity for a new job with better pay and benefits.    Just wondering if she should take her new job.  Has a few hesitations due to her recent surgery.  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  Would have to lift, bend, climb.      Told her she is off restrictions.  This should be fine.    Pt also concerned because in past she used to go out to eat at fast food places for lunch when she worked her in past.  Not sure how she would deal with this.  She knows she would have to pack a lunch but now that she can't pack a sandwich due to the bread, she is not sure what she would do.      Will have dietician call pt to discuss options.      Pt is available before 10:40am  today and after 11 am.

## 2018-05-03 ENCOUNTER — OFFICE VISIT (OUTPATIENT)
Dept: FAMILY MEDICINE | Facility: CLINIC | Age: 33
End: 2018-05-03
Payer: COMMERCIAL

## 2018-05-03 VITALS
TEMPERATURE: 98.6 F | WEIGHT: 203 LBS | RESPIRATION RATE: 14 BRPM | BODY MASS INDEX: 33.78 KG/M2 | SYSTOLIC BLOOD PRESSURE: 100 MMHG | HEART RATE: 64 BPM | DIASTOLIC BLOOD PRESSURE: 62 MMHG

## 2018-05-03 DIAGNOSIS — H69.92 DYSFUNCTION OF LEFT EUSTACHIAN TUBE: ICD-10-CM

## 2018-05-03 DIAGNOSIS — R21 RASH AND NONSPECIFIC SKIN ERUPTION: Primary | ICD-10-CM

## 2018-05-03 PROCEDURE — 99213 OFFICE O/P EST LOW 20 MIN: CPT | Performed by: PHYSICIAN ASSISTANT

## 2018-05-03 RX ORDER — FLUTICASONE PROPIONATE 50 MCG
1-2 SPRAY, SUSPENSION (ML) NASAL DAILY
Qty: 1 BOTTLE | Refills: 0 | Status: SHIPPED | OUTPATIENT
Start: 2018-05-03 | End: 2018-08-03

## 2018-05-03 RX ORDER — TRIAMCINOLONE ACETONIDE 1 MG/G
CREAM TOPICAL
Qty: 80 G | Refills: 0 | Status: SHIPPED | OUTPATIENT
Start: 2018-05-03 | End: 2018-08-03

## 2018-05-03 ASSESSMENT — ANXIETY QUESTIONNAIRES
GAD7 TOTAL SCORE: 1
5. BEING SO RESTLESS THAT IT IS HARD TO SIT STILL: NOT AT ALL
3. WORRYING TOO MUCH ABOUT DIFFERENT THINGS: NOT AT ALL
GAD7 TOTAL SCORE: 1
4. TROUBLE RELAXING: NOT AT ALL
1. FEELING NERVOUS, ANXIOUS, OR ON EDGE: NOT AT ALL
7. FEELING AFRAID AS IF SOMETHING AWFUL MIGHT HAPPEN: NOT AT ALL
7. FEELING AFRAID AS IF SOMETHING AWFUL MIGHT HAPPEN: NOT AT ALL
6. BECOMING EASILY ANNOYED OR IRRITABLE: SEVERAL DAYS
2. NOT BEING ABLE TO STOP OR CONTROL WORRYING: NOT AT ALL
GAD7 TOTAL SCORE: 1

## 2018-05-03 ASSESSMENT — PAIN SCALES - GENERAL: PAINLEVEL: NO PAIN (0)

## 2018-05-03 ASSESSMENT — PATIENT HEALTH QUESTIONNAIRE - PHQ9
SUM OF ALL RESPONSES TO PHQ QUESTIONS 1-9: 0
SUM OF ALL RESPONSES TO PHQ QUESTIONS 1-9: 0
10. IF YOU CHECKED OFF ANY PROBLEMS, HOW DIFFICULT HAVE THESE PROBLEMS MADE IT FOR YOU TO DO YOUR WORK, TAKE CARE OF THINGS AT HOME, OR GET ALONG WITH OTHER PEOPLE: NOT DIFFICULT AT ALL

## 2018-05-03 NOTE — MR AVS SNAPSHOT
After Visit Summary   5/3/2018    Geneva Cooley    MRN: [id number]           Patient Information     Date Of Birth          1985        Visit Information        Provider Department      5/3/2018 4:20 PM Bibi Wakefield PA-C [facility]        Today's Diagnoses     Rash and nonspecific skin eruption    -  1      Care Instructions    Triamcinolone topical steroid to the rashy areas 2-3x daily for max of 14 days. Stop once the skin is clear.     Consider Eucerin (thick white paste like lotion)- comes in tub (jar).   For dry skin    Follow up if not improving.     Topical steroid tx: use thin layer, for shortest duration, not to be used on face and possible topical steroid side effects (thinning skin, etc).                    Follow-ups after your visit        Your next 10 appointments already scheduled     May 14, 2018  8:30 AM CDT   Return Visit with Vero Prado 1, RD   [facility] ([facility])    [address] [place] [id number] [phone]            May 14, 2018  9:00 AM CDT   Return Visit with Esthela Simeon PA-C   [facility] ([facility])    [address] [place] [address] [id number] [phone]              Who to contact     If you have questions or need follow up information about today's clinic visit or your schedule please contact [facility] directly at [phone].  Normal or non-critical lab and imaging results will be communicated to you by ezTaxihart, letter or phone within 4 business days after the clinic has received the results. If you do not hear from us within 7 days, please contact the clinic through ezTaxihart or phone. If you have a critical or abnormal lab result, we will notify you by phone as soon as possible.  Submit refill requests through Critical Links or call your pharmacy and they  will forward the refill request to us. Please allow 3 business days for your refill to be completed.          Additional Information About Your Visit        Cirtas SystemsharCormedics Information     NCLC gives you secure access to your electronic health record. If you see a primary care provider, you can also send messages to your care team and make appointments. If you have questions, please call your primary care clinic.  If you do not have a primary care provider, please call [phone] and they will assist you.        Care EveryWhere ID     This is your Care EveryWhere ID. This could be used by other organizations to access your [facility] medical records  [id number]        Your Vitals Were     Pulse Temperature Respirations Last Period BMI (Body Mass Index)       64 98.6  F (37  C) (Temporal) 14 02/20/2018 33.78 kg/m2        Blood Pressure from Last 3 Encounters:   05/03/18 100/62   03/13/18 118/62   02/20/18 100/70    Weight from Last 3 Encounters:   05/03/18 203 lb (92.1 kg)   03/13/18 222 lb 8 oz (100.9 kg)   03/13/18 222 lb 12.8 oz (101.1 kg)              Today, you had the following     No orders found for display         Today's Medication Changes          These changes are accurate as of 5/3/18  5:04 PM.  If you have any questions, ask your nurse or doctor.               Start taking these medicines.        Dose/Directions    triamcinolone 0.1 % cream   Commonly known as:  KENALOG   Used for:  Rash and nonspecific skin eruption   Started by:  Bibi Wakefield PA-C        Apply sparingly to affected area two - three times daily max of 14 days.   Quantity:  80 g   Refills:  0            Where to get your medicines      These medications were [facility] to [id number] - [place], [place] - [address]  [address] [address]    Hours:  test Rx sent successfully 12/26/02   Phone:  [phone]     triamcinolone 0.1 % cream                Primary Care Provider Office Phone # Fax #    Jody  Jessi Bendern, APRN [phone]       [address] [address] [place] [address]        Equal Access to Services     RUBEN CYR : Hadphilly barbi patterson enrikeo Soangelicaali, wajose franciscoda luqadaha, qaybta kaalmada adeendy, oscar rizo melonyolayinka moncada laAlbertonancy mata. So [facility] [phone].    ATENCIÓN: Si habla español, tiene a tyler disposición servicios gratuitos de asistencia lingüística. Llame al [phone].    We comply with applicable federal civil rights laws and [place] laws. We do not discriminate on the basis of race, color, national origin, age, disability, sex, sexual orientation, or gender identity.            Thank you!     Thank you for choosing [facility]  for your care. Our goal is always to provide you with excellent care. Hearing back from our patients is one way we can continue to improve our services. Please take a few minutes to complete the written survey that you may receive in the mail after your visit with us. Thank you!             Your Updated Medication List - Protect others around you: Learn how to safely use, store and throw away your medicines at www.disposemymeds.org.          This list is accurate as of 5/3/18  5:04 PM.  Always use your most recent med list.                   Brand Name Dispense Instructions for use Diagnosis    etonogestrel-ethinyl estradiol 0.12-0.015 MG/24HR vaginal ring    NUVARING     Place 1 each vaginally every 21 days Place 1 ring every 21 days then remove for 1 week. Needs appointment for further refills        multivitamin  peds with iron 60 MG chewable tablet      Take 2 chew tab by mouth 2 times daily        MULTIVITAMIN ADULT PO      Take 1 capsule by mouth daily        omeprazole 20 MG tablet    priLOSEC OTC    30 tablet    Take 1 tablet (20 mg) by mouth daily    Bariatric surgery status, GERD without esophagitis       ondansetron 4 MG ODT tab    ZOFRAN-ODT    20 tablet    Take 1 tablet (4 mg) by mouth every 6 hours as needed for nausea or  vomiting    Morbid obesity with BMI of 40.0-44.9, adult (H)       traMADol 50 MG tablet    ULTRAM    30 tablet    Take 1-2 tablets ( mg) by mouth every 6 hours as needed    Morbid obesity with BMI of 40.0-44.9, adult (H)       triamcinolone 0.1 % cream    KENALOG    80 g    Apply sparingly to affected area two - three times daily max of 14 days.    Rash and nonspecific skin eruption       TYLENOL PO      Take 1,000 mg by mouth 2 times daily        VIACTIV PO      Take 1 chew tab by mouth 3 times daily (with meals)        VITAMIN B12 PO      Take 5,000 mcg by mouth once a week        VITAMIN D (CHOLECALCIFEROL) PO      Take 5,000 Units by mouth daily

## 2018-05-03 NOTE — PATIENT INSTRUCTIONS
Triamcinolone topical steroid to the rashy areas 2-3x daily for max of 14 days. Stop once the skin is clear.     Consider Eucerin (thick white paste like lotion)- comes in tub (jar).   For dry skin    Follow up if not improving.     Topical steroid tx: use thin layer, for shortest duration, not to be used on face and possible topical steroid side effects (thinning skin, etc).

## 2018-05-03 NOTE — PROGRESS NOTES
SUBJECTIVE:   Geneva Cooley is a 32 year old female who presents to clinic today for the following health issues:      Rash   Associated symptoms include a rash.   History of Present Illness     Diet:  Other  Frequency of exercise:  None  Taking medications regularly:  Not Applicable  Medication side effects:  Not applicable  Additional concerns today:  No    Rash  Onset: 2 week, the itching started on left elbow and now it both arms ( noticed about 1 week after the left arm).  Started as small area near left elbow. Then quickly expanded down left forearm and also on right forearm but has since been stable- not expanding, not changing.  Like little pink bumps.   More red when hot.   Itches.   No where else on body.  Has dry skin  No new topicals in past few weeks. No new detergents, soaps.   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 Tires sometimes have something on them but doesn't think matches area of exposure/rash distribution    Description:   Location: bilateral arm   Character: raised, painful, burning, red  Itching (Pruritis): YES    Progression of Symptoms:  worsening    Accompanying Signs & Symptoms:  Fever: no   Body aches or joint pain: no   Sore throat symptoms: no   Recent cold symptoms: no     History:   Previous similar rash: no     Precipitating factors:   Exposure to similar rash: no   New exposures: medication - supplements about 2 months ago   Recent travel: no     Alleviating factors:  None   Therapies Tried and outcome: coconut oil and benadryl allergy.     Bariatric surgery 02/2018- taking supplements. Doing well. Down from max weight of 270 lb (in 2017) to 203 lb    Left ear always feels plugged. Has not tried flonase.     Problem list and histories reviewed & adjusted, as indicated.  Additional history: as documented      Patient Active Problem List   Diagnosis     Insomnia     Knee strain     Anxiety     Major depressive disorder, single episode, moderate (H)     Chronic bilateral low  back pain without sciatica     Family history of breast cancer in mother     Nieves splints     Family history of rheumatoid arthritis     Bariatric surgery status     Dehydration     Obesity (BMI 35.0-39.9 without comorbidity)     Past Surgical History:   Procedure Laterality Date     COMBINED LAPAROSCOPIC GASTRIC SLEEVE, CHOLECYSTECTOMY N/A 2/5/2018    Procedure: COMBINED LAPAROSCOPIC GASTRIC SLEEVE, CHOLECYSTECTOMY;   LAPAROSCOPIC GASTRIC SLEEVE ;  Surgeon: Yoel Lowry MD;  Location: [facility] OR     NO HISTORY OF SURGERY         Social History   Substance Use Topics     Smoking status: Never Smoker     Smokeless tobacco: Never Used      Comment: smoked at age 17      Alcohol use No      Comment: rarely     Family History   Problem Relation Age of Onset     Hypertension Mother      DIABETES Mother      CEREBROVASCULAR DISEASE Mother 46     aneurysm     Lipids Mother      Depression Mother      Breast Cancer Mother 60     Hypertension Father      Arthritis Father      HEART DISEASE Father      MI in late 40's      Lipids Father      Depression Sister      Hypertension Sister      Hypertension Brother          Current Outpatient Prescriptions   Medication Sig Dispense Refill     Acetaminophen (TYLENOL PO) Take 1,000 mg by mouth 2 times daily       Calcium-Vitamin D-Vitamin K (VIACTIV PO) Take 1 chew tab by mouth 3 times daily (with meals)        Cyanocobalamin (VITAMIN B12 PO) Take 5,000 mcg by mouth once a week       etonogestrel-ethinyl estradiol (NUVARING) 0.12-0.015 MG/24HR vaginal ring Place 1 each vaginally every 21 days Place 1 ring every 21 days then remove for 1 week. Needs appointment for further refills       Multiple Vitamins-Minerals (MULTIVITAMIN ADULT PO) Take 1 capsule by mouth daily       omeprazole (PRILOSEC OTC) 20 MG tablet Take 1 tablet (20 mg) by mouth daily 30 tablet 2     VITAMIN D, CHOLECALCIFEROL, PO Take 5,000 Units by mouth daily       multivitamin  peds with iron (FLINTSTONES COMPLETE) 60  MG chewable tablet Take 2 chew tab by mouth 2 times daily       ondansetron (ZOFRAN-ODT) 4 MG ODT tab Take 1 tablet (4 mg) by mouth every 6 hours as needed for nausea or vomiting (Patient not taking: Reported on 3/13/2018) 20 tablet 0     traMADol (ULTRAM) 50 MG tablet Take 1-2 tablets ( mg) by mouth every 6 hours as needed (Patient not taking: Reported on 3/13/2018) 30 tablet 0     Allergies   Allergen Reactions     Vicodin [Hydrocodone-Acetaminophen] Hives     BP Readings from Last 3 Encounters:   05/03/18 100/62   03/13/18 118/62   02/20/18 100/70    Wt Readings from Last 3 Encounters:   05/03/18 203 lb (92.1 kg)   03/13/18 222 lb 8 oz (100.9 kg)   03/13/18 222 lb 12.8 oz (101.1 kg)                  Labs reviewed in EPIC    ROS:  No fevers, chills, URI sx.   Constitutional, HEENT, cardiovascular, pulmonary, gi and gu systems are negative, except as otherwise noted.    OBJECTIVE:     /62  Pulse 64  Temp 98.6  F (37  C) (Temporal)  Resp 14  Wt 203 lb (92.1 kg)  LMP 02/20/2018  BMI 33.78 kg/m2  Body mass index is 33.78 kg/(m^2).  GENERAL: healthy, alert and no distress  EYES: Eyes grossly normal to inspection, PERRL and conjunctivae and sclerae normal  HENT: ear canals and TM's normal- very small arc clear fluid behind left TM; nose and mouth without ulcers or lesions  NECK: no adenopathy, no asymmetry, masses, or scars and thyroid normal to palpation  RESP: lungs clear to auscultation - no rales, rhonchi or wheezes  CV: regular rate and rhythm, normal S1 S2, no S3 or S4, no murmur, click or rub, no peripheral edema and peripheral pulses strong  SKIN: anterior arms: just above elbow down forearm- (the area exposed below rolled cuff of work shirt): maculopapular pink rash.  NEURO: Normal strength and tone, mentation intact and speech normal                Diagnostic Test Results:  none     ASSESSMENT/PLAN:       1. Rash and nonspecific skin eruption  Contact dermatitis- rash starts where skin is  exposed under work shirt- vs other dermatitis.   Topical steroid as below- steroid precautions discussed and in AVS.   Once skin is clear- discussed eucerin qhs (chronically dry skin)  - triamcinolone (KENALOG) 0.1 % cream; Apply sparingly to affected area two - three times daily max of 14 days.  Dispense: 80 g; Refill: 0    2. Dysfunction of left eustachian tube  Start flonase for left ear sx as below  - fluticasone (FLONASE) 50 MCG/ACT spray; Spray 1-2 sprays into both nostrils daily  Dispense: 1 Bottle; Refill: 0    Follow Up: For worsening or changing symptoms, non-improvement as expected/discussed, questions regarding your medications or treatment plan patient was instructed to contact the clinic. Discussed parameters for follow up and included in After Visit Summary given to patient.      Bibi Wakefield PA-C  [facility]

## 2018-05-04 ASSESSMENT — PATIENT HEALTH QUESTIONNAIRE - PHQ9: SUM OF ALL RESPONSES TO PHQ QUESTIONS 1-9: 0

## 2018-05-04 ASSESSMENT — ANXIETY QUESTIONNAIRES: GAD7 TOTAL SCORE: 1

## 2018-05-14 ENCOUNTER — OFFICE VISIT (OUTPATIENT)
Dept: SURGERY | Facility: CLINIC | Age: 33
End: 2018-05-14
Payer: COMMERCIAL

## 2018-05-14 VITALS
HEART RATE: 62 BPM | DIASTOLIC BLOOD PRESSURE: 65 MMHG | RESPIRATION RATE: 16 BRPM | SYSTOLIC BLOOD PRESSURE: 109 MMHG | WEIGHT: 197.5 LBS | HEIGHT: 65 IN | BODY MASS INDEX: 32.9 KG/M2

## 2018-05-14 VITALS — WEIGHT: 197.5 LBS | BODY MASS INDEX: 32.9 KG/M2 | HEIGHT: 65 IN

## 2018-05-14 DIAGNOSIS — Z30.09 BIRTH CONTROL COUNSELING: ICD-10-CM

## 2018-05-14 DIAGNOSIS — K21.9 GERD WITHOUT ESOPHAGITIS: ICD-10-CM

## 2018-05-14 DIAGNOSIS — K91.2 POSTSURGICAL MALABSORPTION: ICD-10-CM

## 2018-05-14 DIAGNOSIS — Z98.84 BARIATRIC SURGERY STATUS: ICD-10-CM

## 2018-05-14 DIAGNOSIS — E66.811 OBESITY, CLASS I, BMI 30-34.9: ICD-10-CM

## 2018-05-14 DIAGNOSIS — E66.811 OBESITY, CLASS I, BMI 30-34.9: Primary | ICD-10-CM

## 2018-05-14 PROBLEM — E86.0 DEHYDRATION: Status: RESOLVED | Noted: 2018-02-13 | Resolved: 2018-05-14

## 2018-05-14 PROCEDURE — 83970 ASSAY OF PARATHORMONE: CPT | Performed by: PHYSICIAN ASSISTANT

## 2018-05-14 PROCEDURE — 82306 VITAMIN D 25 HYDROXY: CPT | Performed by: PHYSICIAN ASSISTANT

## 2018-05-14 PROCEDURE — 82607 VITAMIN B-12: CPT | Performed by: PHYSICIAN ASSISTANT

## 2018-05-14 PROCEDURE — 36415 COLL VENOUS BLD VENIPUNCTURE: CPT | Performed by: PHYSICIAN ASSISTANT

## 2018-05-14 PROCEDURE — 99214 OFFICE O/P EST MOD 30 MIN: CPT | Performed by: PHYSICIAN ASSISTANT

## 2018-05-14 PROCEDURE — 97803 MED NUTRITION INDIV SUBSEQ: CPT | Performed by: DIETITIAN, REGISTERED

## 2018-05-14 PROCEDURE — 83550 IRON BINDING TEST: CPT | Performed by: PHYSICIAN ASSISTANT

## 2018-05-14 PROCEDURE — 83540 ASSAY OF IRON: CPT | Performed by: PHYSICIAN ASSISTANT

## 2018-05-14 PROCEDURE — 82728 ASSAY OF FERRITIN: CPT | Performed by: PHYSICIAN ASSISTANT

## 2018-05-14 RX ORDER — OMEPRAZOLE 20 MG/1
20 TABLET, DELAYED RELEASE ORAL DAILY
Qty: 90 TABLET | Refills: 1 | Status: SHIPPED | OUTPATIENT
Start: 2018-05-14 | End: 2018-12-03

## 2018-05-14 NOTE — MR AVS SNAPSHOT
MRN:[id number]                      After Visit Summary   5/14/2018    Geneva Cooley    MRN: [id number]           Visit Information        Provider Department      5/14/2018 8:30 AM 1, Vero Garrett Diet, RD [facility] - [facility]      Your next 10 appointments already scheduled     Aug 10, 2018  9:00 AM CDT   Return Visit with Vero Garrett Diet 2, RD   [facility] [place] ([facility])    [address] [place] [address] [phone] [phone]            Aug 10, 2018  9:30 AM CDT   Return Visit with Esthela Simeon PA-C   [facility] [place] ([facility])    [address] [place] [phone]              MyChart Information     [employer] gives you secure access to your electronic health record. If you see a primary care provider, you can also send messages to your care team and make appointments. If you have questions, please call your primary care clinic.  If you do not have a primary care provider, please call [phone] and they will assist you.        Care EveryWhere ID     This is your Care EveryWhere ID. This could be used by other organizations to access your [facility] medical records  [id number]        Equal Access to Services     RUBEN CYR : Hadii aad ku hadasho Soangelicaali, waaxda luqadaha, qaybta kaalmada jose elias, oscar mata. So [facility] [phone].    ATENCIÓN: Si habla español, tiene a tyler disposición servicios gratuitos de asistencia lingüística. Llame al [phone].    We comply with applicable federal civil rights laws and [place] laws. We do not discriminate on the basis of race, color, national origin, age, disability, sex, sexual orientation, or gender identity.

## 2018-05-14 NOTE — MR AVS SNAPSHOT
After Visit Summary   5/14/2018    Geneva Cooley    MRN: [id number]           Patient Information     Date Of Birth          1985        Visit Information        Provider Department      5/14/2018 9:00 AM Esthela Simeon PA-C [facility]      Today's Diagnoses     Obesity, Class I, BMI 30-34.9    -  1    Bariatric surgery status        Postsurgical malabsorption        GERD without esophagitis          Care Instructions    Have follow up labs drawn.    Check in at the [facility] on the 1st floor if you would like to have your blood tests drawn today or call [phone] to make an appointment to have them drawn on another day.  You may also get your blood drawn at your [facility] home clinic.  Return to clinic in 3 months.     Telogen Effluvium (Hair Loss)  What Is It?  At any given time, about 85% to 90% of the hairs on the average person's head are actively growing (the anagen phase) and the others are resting (the telogen phase). Typically, a hair is in the anagen phase for two to four years, then enters the telogen phase, rests for about two to four months, and then falls out and is replaced by a new, growing hair. The average person naturally loses about 100 hairs a day.  In a person with telogen effluvium, some body change or shock pushes more hairs into the telogen phase. About 30% of the hairs stop growing and go into the resting phase before falling out. So you may lose an average of 300 hairs a day instead of 100.  Telogen effluvium can be triggered by a number of different events, including:    Surgery (like wt. loss surgery)    Extreme weight loss    Extreme change in diet    Abrupt hormonal changes, including those associated with childbirth and menopause    Iron deficiency    Hypothyroidism or hyperthyroidism  Because hairs that enter the telogen phase rest in place for two to four months before  falling out, you may not notice any hair loss until two to four months after the event that caused the problem. Telogen effluvium rarely lasts longer than six months.  Although losing a great number of hairs within a short time can be frightening, the condition is  temporary. Each hair pushed out is replaced by a new, growing hair. Because hair on the scalp grows slowly, your hair may feel or look thinner than usual for a time but fullness will return as the new hairs grow in.  Expected Duration  Typically, hair loss begins two to four months after the event that triggered the problem, and lasts approximately six months. New hairs begin growing immediately after the hair falls out, but significant growth may not be noticed for several months.  Prevention  Nothing can be done to prevent most of the types of physical shock that can start telogen effluvium.   Treatment  No treatment for active telogen effluvium has been proven effective.                  Follow-ups after your visit        Follow-up notes from your care team     Return in 3 months (on 8/14/2018).      Future tests that were ordered for you today     Open Future Orders        Priority Expected Expires Ordered    Vitamin D Screen Routine 5/14/2018 11/10/2018 5/14/2018    Parathyroid Hormone Intact Routine 5/14/2018 11/10/2018 5/14/2018    Iron and Iron Binding Capacity Routine 5/14/2018 11/10/2018 5/14/2018    Ferritin Routine 5/14/2018 11/10/2018 5/14/2018    Vitamin B12 Routine 5/14/2018 11/10/2018 5/14/2018            Who to contact     If you have questions or need follow up information about today's clinic visit or your schedule please contact [facility] directly at [phone].  Normal or non-critical lab and imaging results will be communicated to you by MyChart, letter or phone within 4 business days after the clinic has received the results. If you do not hear from us within 7 days, please contact the clinic  "through [employer] or phone. If you have a critical or abnormal lab result, we will notify you by phone as soon as possible.  Submit refill requests through [employer] or call your pharmacy and they will forward the refill request to us. Please allow 3 business days for your refill to be completed.          Additional Information About Your Visit        [employer] Information     [employer] gives you secure access to your electronic health record. If you see a primary care provider, you can also send messages to your care team and make appointments. If you have questions, please call your primary care clinic.  If you do not have a primary care provider, please call [phone] and they will assist you.        Care EveryWhere ID     This is your Care EveryWhere ID. This could be used by other organizations to access your [facility] medical records  [id number]        Your Vitals Were     Pulse Respirations Height BMI (Body Mass Index)          62 16 5' 5\" (1.651 m) 32.87 kg/m2         Blood Pressure from Last 3 Encounters:   05/14/18 109/65   05/03/18 100/62   03/13/18 118/62    Weight from Last 3 Encounters:   05/14/18 197 lb 8 oz (89.6 kg)   05/14/18 197 lb 8 oz (89.6 kg)   05/03/18 203 lb (92.1 kg)                 Today's Medication Changes          These changes are accurate as of 5/14/18  9:29 AM.  If you have any questions, ask your nurse or doctor.               Stop taking these medicines if you haven't already. Please contact your care team if you have questions.     ondansetron 4 MG ODT tab   Commonly known as:  ZOFRAN-ODT   Stopped by:  Esthela Simeon PA-C           traMADol 50 MG tablet   Commonly known as:  ULTRAM   Stopped by:  Esthela Simeon PA-C           TYLENOL PO   Stopped by:  Esthela Simeon PA-C                Where to get your medicines      These medications were sent to [id number] - [place], [place] - [address]  [address] [address]    " Hours:  test Rx sent successfully 12/26/02   Phone:  [phone]     omeprazole 20 MG tablet                Primary Care Provider Office Phone # Fax #    CLAIRE [facility] [phone] [address] [facility] [address]        Equal Access to Services     [facility] : Hadii aad ku hadasho Soomaali, waaxda luqadaha, qaybta kaalmada adeegyada, waxay idiin hayaan adeeg kharash la'aan . So [facility] [phone].    ATENCIÓN: Si habla español, tiene a tyler disposición servicios gratuitos de asistencia lingüística. [facility] [phone].    We comply with applicable federal civil rights laws and [place] laws. We do not discriminate on the basis of race, color, national origin, age, disability, sex, sexual orientation, or gender identity.            Thank you!     Thank you for choosing [facility]  for your care. Our goal is always to provide you with excellent care. Hearing back from our patients is one way we can continue to improve our services. Please take a few minutes to complete the written survey that you may receive in the mail after your visit with us. Thank you!             Your Updated Medication List - Protect others around you: Learn how to safely use, store and throw away your medicines at www.disposemymeds.org.          This list is accurate as of 5/14/18  9:29 AM.  Always use your most recent med list.                   Brand Name Dispense Instructions for use Diagnosis    etonogestrel-ethinyl estradiol 0.12-0.015 MG/24HR vaginal ring    NUVARING     Place 1 each vaginally every 21 days Place 1 ring every 21 days then remove for 1 week. Needs appointment for further refills        fluticasone 50 MCG/ACT spray    FLONASE    1 Bottle    Spray 1-2 sprays into both nostrils daily    Dysfunction of left eustachian tube       multivitamin  peds with iron 60 MG chewable tablet      Take 2 chew tab by mouth 2 times daily        MULTIVITAMIN  ADULT PO      Take 1 capsule by mouth daily        omeprazole 20 MG tablet    priLOSEC OTC    90 tablet    Take 1 tablet (20 mg) by mouth daily    Bariatric surgery status, GERD without esophagitis       triamcinolone 0.1 % cream    KENALOG    80 g    Apply sparingly to affected area two - three times daily max of 14 days.    Rash and nonspecific skin eruption       VIACTIV PO      Take 1 chew tab by mouth 3 times daily (with meals)        VITAMIN B12 PO      Take 5,000 mcg by mouth once a week        VITAMIN D (CHOLECALCIFEROL) PO      Take 5,000 Units by mouth daily

## 2018-05-14 NOTE — PROGRESS NOTES
"BARIATRIC FOLLOW UP VISIT     May 14, 2018       HISTORY OF PRESENT ILLNESS: Pt returns today for her follow-up appointment status post laparoscopic gastric sleeve.     Initial Weight: 252 lb (114.3 kg)   Current Weight: 197 lb 8 oz (89.6 kg)  Cumulative weight loss (lbs): 54.5  Last Visits Weight: 222 lb (100.7 kg)     Patient is taking the following bariatric postoperative vitamins:  2 Complete multivitamins with minerals (at different times than calcium)   5000 International Units of Vitamin D daily  0936-0604 mg of Calcium daily in divided doses  1000 mcg of Vitamin B12 sl daily  1 Iron/Vit C. daily    Pt is exercising by getting at least 10,000 steps daily.         OBESITY RELATED CONDITIONS:  Low back pain- significantly improved     SOCIAL HISTORY:  Pt denies smoking.  Pt denies alcohol use.  Avoids NSAIDS.  Is on a support group on "[employer]  She is working two jobs as a  and a  for tires.      REVIEW OF SYSTEMS:     GI:  Nausea-none  Vomiting-none  Diarrhea-none  Constipation-none  Dysphagia-none  Abdominal Pain-none  Heartburn-present, if she forgets to take her omeprazole.  Controlled if taking 20 mg daily.      SKIN:  Intertriginous irritation-none  Hair Loss- none.  Is concerned about hair loss but currently not experiencing any.       PSYCH:  Depression-none  Anxiety-none    :   Right after surgery she had menses for 2 weeks.  Put in Nuvaring following this.  Has had the same one in since.  Is not currently having intercourse.   Has no more Nuvaring refills. Needs to be seen with PCP to get more.  Pt does not want to get pregnant. Will use Condoms as second form on birth control.        PHYSICAL EXAMINATION:   /65  Pulse 62  Resp 16  Ht 5' 5\" (1.651 m)  Wt 197 lb 8 oz (89.6 kg)  BMI 32.87 kg/m2    GENERAL: Alert and oriented x3. NAD  HEART: No murmurs, rubs or gallops, Regular rate and rhythm  LUNGS: Breathing unlabored, Lung sounds clear to auscultation " bilaterally  ABDOMEN: soft; nontender; nondistended, incision well healed. No hernia  EXTREMITIES: No LE edema bilaterally, Gait normal  SKIN: No intertriginous irritation or rash      ASSESSMENT AND PLAN:      3 months status laparoscopic gastric sleeve    Pt ed: Gave written patient information on telogen effluvium and discussed normal course of hair loss.  Obesity  Body mass index is 32.87 kg/(m^2).  Post surgical malabsorption:   Ordered vitamin B12, vitamin D, PTH, ferritin, TIBC, and iron labs.   Follow food plan per dietitian recommendations.   Continue taking recommended post-op vitamins.  GERD   Refilled omeprazole 20 mg daily. 90 day supply with 1 refill.   Birth Control Counseling   We discussed the need for 2 forms of birth control for the first 18 months following bariatric surgery. Her current Nuvaring is not effective.  She will follow up with her PCP to get refills of Nuvaring.  She will abstain from intercourse until she has new Nuvaring in place and will also use condoms as secondary method.       Return to clinic in 3 months.    I spent a total of 25 minutes face to face with Geneva during today's office visit. Over 50% of this time was spent counseling the patient and/or coordinating care.

## 2018-05-14 NOTE — PROGRESS NOTES
"NUTRITION POST OP APPOINTMENT  DATE OF VISIT: May 14, 2018    Geneva Cooley  1985  female  [id number]  32 year old     ASSESSMENT:    REASON FOR VISIT:  Geneva is a 32 year old year old female presents today for 3 month PO nutrition follow-up appointment. Patient is accompanied by self.    DIAGNOSIS:  Status post gastric sleeve surgery.  Obesity Grade I BMI 30-34.9     ANTHROPOMETRICS:  Initial Weight: 114.3 kg (252 lb)  Height: 165.1 cm (5' 5\")  Current Weight: 89.6 kg (197 lb 8 oz)   BMI: 32.93 kg/(m^2).    VITAMINS AND MINERALS:  1 Multivitamin with Minerals (Bariatric capsule that pt said has been approved by the clinic)  600 mg Calcium With Vitamin D BID-takes 2 hours away from MVI and Vitron C  5000 International units Vitamin D  5000 mcg Vitamin B-12 sublingual-1 X per week  1 Vitron C    NUTRITION HISTORY:  Breakfast: Protein drink or fruit or cheerios with milk  Lunch:1/2 cup tuna with light spence., 3-4 crackers   Supper: zesty Italian chicken  Snacks: rare or P-3 (ham jerky, sunflower seeds, peanuts)   Fluids consumed: 49 oz Water, Protein Drink and enahnced water, decaf esspresso, skim milk  Consuming liquid calories: Yes  Protein intake: 60-70 grams/day  Tolerate regular texture food: Yes  Any foods not tolerated details: Yes  If any food not tolerated: cauliflower  Portion size: 1/2-1 cup  Take 20-30 minutes to consume each meal: Yes   Eat protein foods first: No  Fluids and meals separate by at least 30 minutes: Yes  Chew foods thoroughly: Yes  Tolerating diet: Yes  Drinking high protein supplements: Yes  Consuming snacks per day: 0-1  Additional Information: patient is working 2 jobs and is in a vehicle daily; 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); pt is looking for a less expensive multivitamin/ mineral; offered suggestions for comparable products; plans to join a gym soon; discussed limiting intake of nuts due to being calorically dense    PHYSICAL ACTIVITY:  Type: " weight lifting (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)  2X per week for 20 minutes    DIAGNOSIS:  Previous Nutrition Diagnosis: Altered gastrointestinal function related to alteration in gastrointestinal structure as evidenced by history of gastric sleeve surgery.- no change    Previous goals:  Start soft diet at day 28 post op.-met  Continue MVI, calcium with vitamin D, vitamin B12, vitamin D, and  iron with vitamin C-met  Aim for 60 to 90 grams protein-met  Continue 48 to 64 oz of fluid per day-met    Current Nutrition Diagnosis: Altered gastrointestinal function related to alteration in gastrointestinal structure as evidenced by history of gastric sleeve surgery.    INTERVENTION:   Nutrition Prescription: Eat 3 meals a day at regular intervals. Consume 60-90 grams of protein daily. Follow post-surgical vitamin and mineral protocol.  Assessed learning needs and learning preferences.    GOALS:  Relating To Eating:  Include 3 different food groups per meal     Relating to beverages:  Drink 48-64 ounces of fluid per day    Related to activity:  Increase activity level.  Exercise 3 days/week for 30 minutes    Implementation: Discussed progress toward previous goals; reinforced importance of following bariatric lifestyle changes. Discussed options for vitamins/ minerals    NUTRITION MONITORING AND EVALUATION:  Anticipated compliance: fair-good  Verbalized fair-good understanding.    Follow up: Patient to follow up in 3 months.    TIME SPENT WITH PATIENT:  25 minutes    Albert Rocha RD, LD  [facility]  [phone]

## 2018-05-14 NOTE — PATIENT INSTRUCTIONS
Have follow up labs drawn.    Check in at the [facility] on the 1st floor if you would like to have your blood tests drawn today or call [phone] to make an appointment to have them drawn on another day.  You may also get your blood drawn at your [facility] clinic.  Return to clinic in 3 months.     Telogen Effluvium (Hair Loss)  What Is It?  At any given time, about 85% to 90% of the hairs on the average person's head are actively growing (the anagen phase) and the others are resting (the telogen phase). Typically, a hair is in the anagen phase for two to four years, then enters the telogen phase, rests for about two to four months, and then falls out and is replaced by a new, growing hair. The average person naturally loses about 100 hairs a day.  In a person with telogen effluvium, some body change or shock pushes more hairs into the telogen phase. About 30% of the hairs stop growing and go into the resting phase before falling out. So you may lose an average of 300 hairs a day instead of 100.  Telogen effluvium can be triggered by a number of different events, including:    Surgery (like wt. loss surgery)    Extreme weight loss    Extreme change in diet    Abrupt hormonal changes, including those associated with childbirth and menopause    Iron deficiency    Hypothyroidism or hyperthyroidism  Because hairs that enter the telogen phase rest in place for two to four months before falling out, you may not notice any hair loss until two to four months after the event that caused the problem. Telogen effluvium rarely lasts longer than six months.  Although losing a great number of hairs within a short time can be frightening, the condition is  temporary. Each hair pushed out is replaced by a new, growing hair. Because hair on the scalp grows slowly, your hair may feel or look thinner than usual for a time but fullness will return as the new hairs grow in.  Expected Duration  Typically, hair loss begins two  to four months after the event that triggered the problem, and lasts approximately six months. New hairs begin growing immediately after the hair falls out, but significant growth may not be noticed for several months.  Prevention  Nothing can be done to prevent most of the types of physical shock that can start telogen effluvium.   Treatment  No treatment for active telogen effluvium has been proven effective.

## 2018-05-15 LAB
FERRITIN SERPL-MCNC: 50 NG/ML (ref 12–150)
IRON SATN MFR SERPL: 17 % (ref 15–46)
IRON SERPL-MCNC: 73 UG/DL (ref 35–180)
PTH-INTACT SERPL-MCNC: 96 PG/ML (ref 18–80)
TIBC SERPL-MCNC: 423 UG/DL (ref 240–430)
VIT B12 SERPL-MCNC: 890 PG/ML (ref 193–986)

## 2018-05-16 LAB — DEPRECATED CALCIDIOL+CALCIFEROL SERPL-MC: 60 UG/L (ref 20–75)

## 2018-05-23 ENCOUNTER — MYC MEDICAL ADVICE (OUTPATIENT)
Dept: FAMILY MEDICINE | Facility: OTHER | Age: 33
End: 2018-05-23

## 2018-05-23 DIAGNOSIS — Z30.44 ENCOUNTER FOR SURVEILLANCE OF VAGINAL RING HORMONAL CONTRACEPTIVE DEVICE: Primary | ICD-10-CM

## 2018-05-24 RX ORDER — ETONOGESTREL AND ETHINYL ESTRADIOL VAGINAL RING .015; .12 MG/D; MG/D
1 RING VAGINAL
Qty: 1 EACH | Refills: 3 | Status: CANCELLED | OUTPATIENT
Start: 2018-05-24

## 2018-05-24 NOTE — TELEPHONE ENCOUNTER
Routing refill request to provider for review/approval because:  Medication is reported/historical

## 2018-05-25 RX ORDER — ETONOGESTREL AND ETHINYL ESTRADIOL VAGINAL RING .015; .12 MG/D; MG/D
RING VAGINAL
Qty: 1 EACH | Refills: 5 | Status: SHIPPED | OUTPATIENT
Start: 2018-05-25 | End: 2019-03-09

## 2018-06-18 ENCOUNTER — TELEPHONE (OUTPATIENT)
Dept: SURGERY | Facility: CLINIC | Age: 33
End: 2018-06-18

## 2018-06-18 NOTE — TELEPHONE ENCOUNTER
Prior Authorization Retail Medication Request    Medication/Dose: Omeprazole 20 MG  ICD code (if different than what is on RX):   Previously Tried and Failed:   Rationale:    Received fax from pharmacy stating that patient's insurance will only over #120 in 365 days.    Insurance Name: Blue Plus  Insurance ID:[id number]        Pharmacy Information (if different than what is on RX)  Name: [facility]  Phone:  [phone]  Fax: [phone]

## 2018-06-18 NOTE — LETTER
[facility]          [address]  [address]  [place] [address]                                         Tel:  [phone]  Fax: [phone]    2018    Geneva Cooley  [address]  [facility] [place] [facility] [address]    : 1985      To whom it may concern:    I am writing to you on behalf of my patient, Geneva Rodriguez. Geneva had the laparoscopic sleeve gastrectomy surgery on 18.  The sleeve gastrectomy is considered to be a high pressured system. This high pressure on the GE and pyloric sphincter can lead to bile or acid reflux often requiring proton-pump Inhibitors.    Immediately post op Geneva was put on Ranitidine 150 mg PO twice daily.  At her 1 week post op appointment she was waking up at night with burning in her chest. She was also dehydrated and required IV hydration due to abdominal pain with drinking or eating.  She was switched to omeprazole 20 mg daily and symptoms resolved. Currently, this therapy is working well for her and I recommend she continue it.     I will see her in follow up in August and again in February.  At each visit, I will reevaluate her symptoms.  If doing well, we will attempt to wean her off her omeprazole back on to an H2 receptor blocker.         Sincerely,        Esthela HI PAMaggiC  Physician Assistant  [facility]

## 2018-06-18 NOTE — TELEPHONE ENCOUNTER
PA Initiation    Medication: Omeprazole 20 MG  Insurance Company: [employer] - Phone [phone] Fax [phone]  Pharmacy Filling the Rx: [id number] - [place], [place] - [address]  Filling Pharmacy Phone: [phone]  Filling Pharmacy Fax:    Start Date: 6/18/2018    THIS HAS BEEN SUBMITTED BY THE PRIOR-AUTHORIZATION TEAM. ANY QUESTIONS PLEASE CALL [phone]. THANK YOU

## 2018-06-18 NOTE — Clinical Note
Yes, We would like to do an appeal.  Please let me now if you have already received this?        MILI

## 2018-06-25 ENCOUNTER — OFFICE VISIT (OUTPATIENT)
Dept: FAMILY MEDICINE | Facility: OTHER | Age: 33
End: 2018-06-25
Payer: COMMERCIAL

## 2018-06-25 VITALS
SYSTOLIC BLOOD PRESSURE: 100 MMHG | TEMPERATURE: 98.4 F | HEART RATE: 68 BPM | BODY MASS INDEX: 31.28 KG/M2 | RESPIRATION RATE: 12 BRPM | WEIGHT: 188 LBS | DIASTOLIC BLOOD PRESSURE: 60 MMHG

## 2018-06-25 DIAGNOSIS — H66.002 ACUTE SUPPURATIVE OTITIS MEDIA OF LEFT EAR WITHOUT SPONTANEOUS RUPTURE OF TYMPANIC MEMBRANE, RECURRENCE NOT SPECIFIED: Primary | ICD-10-CM

## 2018-06-25 DIAGNOSIS — H69.92 DYSFUNCTION OF LEFT EUSTACHIAN TUBE: ICD-10-CM

## 2018-06-25 PROCEDURE — 99213 OFFICE O/P EST LOW 20 MIN: CPT | Performed by: FAMILY MEDICINE

## 2018-06-25 RX ORDER — AMOXICILLIN 500 MG/1
500 CAPSULE ORAL 3 TIMES DAILY
Qty: 30 CAPSULE | Refills: 0 | Status: SHIPPED | OUTPATIENT
Start: 2018-06-25 | End: 2018-08-03

## 2018-06-25 NOTE — PROGRESS NOTES
"  SUBJECTIVE:   Geneva Cooley is a 32 year old female who presents to clinic today for the following health issues:    HPI     Concern - Fluid on ear  Onset: 2 years ago    Description:   Left ear, and feels like swimmers ear    Intensity: severe    Progression of Symptoms:  worsening    Accompanying Signs & Symptoms:  none    Previous history of similar problem:   none    Precipitating factors:   Worsened by: none    Alleviating factors:  Improved by: none    Therapies Tried and outcome: swimmers ear, peroxide, Zyrtec, and Flonase    It feels \"like she is underwater\" and has trouble hearing when it is at its worst. She had an ear infection maybe 2 years ago, feels like the fluid has never gone away since then, even though the ear is no longer infected. Bibi Wakefield prescribed Flonase for this, but it made it worse. Bibi thought it may be a eustachian tube dysfunction.        Problem list and histories reviewed & adjusted, as indicated.  Additional history: as documented    Patient Active Problem List   Diagnosis     Insomnia     Knee strain     Anxiety     Major depressive disorder, single episode, moderate (H)     Chronic bilateral low back pain without sciatica     Family history of breast cancer in mother     Nieves splints     Family history of rheumatoid arthritis     Bariatric surgery status     Obesity, Class I, BMI 30-34.9     Postsurgical malabsorption     Past Surgical History:   Procedure Laterality Date     COMBINED LAPAROSCOPIC GASTRIC SLEEVE, CHOLECYSTECTOMY N/A 2/5/2018    Procedure: COMBINED LAPAROSCOPIC GASTRIC SLEEVE, CHOLECYSTECTOMY;   LAPAROSCOPIC GASTRIC SLEEVE ;  Surgeon: Yoel Lowry MD;  Location: [facility] OR     NO HISTORY OF SURGERY         Social History   Substance Use Topics     Smoking status: Never Smoker     Smokeless tobacco: Never Used      Comment: smoked at age 17      Alcohol use No      Comment: rarely     Family History   Problem Relation Age of Onset     Hypertension Mother  "     Diabetes Mother      Cerebrovascular Disease Mother 46     aneurysm     Lipids Mother      Depression Mother      Breast Cancer Mother 60     Hypertension Father      Arthritis Father      HEART DISEASE Father      MI in late 40's      Lipids Father      Depression Sister      Hypertension Sister      Hypertension Brother            ROS:  Constitutional, HEENT, cardiovascular, pulmonary, GI, , musculoskeletal, neuro, skin, endocrine and psych systems are negative, except as in HPI or otherwise noted.     This document serves as a record of the services and decisions personally performed and made by Yari Rangel MD. It was created on her behalf by Phoebe Sykes, a trained medical scribe. The creation of this document is based the provider's statements to the medical scribe.  Phoebe Sykes, June 25, 2018 4:46 PM     OBJECTIVE:                                                    /60 (BP Location: Right arm, Patient Position: Chair, Cuff Size: Adult Regular)  Pulse 68  Temp 98.4  F (36.9  C) (Temporal)  Resp 12  Wt 85.3 kg (188 lb)  LMP 05/08/2018 (Approximate)  BMI 31.28 kg/m2  Body mass index is 31.28 kg/(m^2).   GENERAL: healthy, alert, well nourished, well hydrated, no distress, overweight  HENT: ear canals- normal; TMs- left is red and bulging; Nose- boggy swollen nasal turbinates; Mouth- no ulcers, no lesions  RESP: lungs clear to auscultation - no rales, no rhonchi, no wheezes  CV: regular rates and rhythm, normal S1 S2, no S3 or S4 and no murmur, no click or rub  SKIN: no suspicious lesions, no rashes to visible skin  PSYCH: Alert and oriented times 3; speech- coherent , normal rate and volume; able to articulate logical thoughts, able to abstract reason, no tangential thoughts, no hallucinations or delusions, affect- normal    Diagnostic test results:  No results found for this or any previous visit (from the past 24 hour(s)).     ASSESSMENT/PLAN:                                                         ICD-10-CM    1. Acute suppurative otitis media of left ear without spontaneous rupture of tympanic membrane, recurrence not specified H66.002      Otitis Media: She has an ear infection in the left ear. Prescribed an antibiotic to help resolve this. Referred to ENT for further evaluation if her symptoms do not resolve with this AB course. If her symptoms continue before she can get in to see ENT, can try a steroid burst.       Patient Instructions   Follow up if your symptoms do not improve or worsen after your antibiotic course. Please call for a prescription if you develop vaginal itchiness or discharge from your antibiotic use.      Schedule with ENT as soon as possible, may book out      The information in this document, created by the medical scribe for me, accurately reflects the services I personally performed and the decisions made by me. I have reviewed and approved this document for accuracy.     Yari Rangel MD, MD  [facility]

## 2018-06-25 NOTE — PROGRESS NOTES
"  SUBJECTIVE:   Geneva Cooley is a 32 year old female who presents to clinic today for the following health issues:  {Provider please address medication reconciliation discrepancies--rooming staff please delete if no med/rec issues}    HPI  Acute Illness   Acute illness concerns: ***  Onset: ***    Fever: { :453219::\"no\"}    Chills/Sweats: { :664911::\"no\"}    Headache (location?): { :954491::\"no\"}    Sinus Pressure:{.:811905::\"no\"}    Conjunctivitis:  {.:239009::\"no\"}    Ear Pain: {.:922203::\"no\"}    Rhinorrhea: { :867276::\"no\"}    Congestion: { :458998::\"no\"}    Sore Throat: { :324834::\"no\"}     Cough: {.:963869::\"no\"}    Wheeze: { :891741::\"no\"}    Decreased Appetite: { :909310::\"no\"}    Nausea: { :065444::\"no\"}    Vomiting: { :832923::\"no\"}    Diarrhea:  { :383999::\"no\"}    Dysuria/Freq.: { :760553::\"no\"}    Fatigue/Achiness: { :652126::\"no\"}    Sick/Strep Exposure: { :274699::\"no\"}     Therapies Tried and outcome: ***    Problem list and histories reviewed & adjusted, as indicated.  Additional history: {NONE - AS DOCUMENTED:905384::\"as documented\"}    {ACUTE Problem SUPERLIST - brief histories:088869}    {HIST REVIEW/ LINKS 2:596662}    {PROVIDER CHARTING PREFERENCE:422027}  "

## 2018-06-25 NOTE — NURSING NOTE
"Chief Complaint   Patient presents with     Otalgia       Initial /60 (BP Location: Right arm, Patient Position: Chair, Cuff Size: Adult Regular)  Pulse 68  Temp 98.4  F (36.9  C) (Temporal)  Resp 12  Wt 188 lb (85.3 kg)  LMP 2018 (Approximate)  BMI 31.28 kg/m2 Estimated body mass index is 31.28 kg/(m^2) as calculated from the following:    Height as of 18: 5' 5\" (1.651 m).    Weight as of this encounter: 188 lb (85.3 kg).  BP completed using cuff size: regular        The following HM Due: NONE      The following patient reported/Care Every where data was sent to:  P ABSTRACT QUALITY INITIATIVES [51871]       N/a    Yari Vela, [facility]  2018           "

## 2018-06-25 NOTE — MR AVS SNAPSHOT
After Visit Summary   6/25/2018    Geneva Cooley    MRN: [id number]           Patient Information     Date Of Birth          1985        Visit Information        Provider Department      6/25/2018 4:15 PM Yari Rangel MD [facility]        Today's Diagnoses     Acute suppurative otitis media of left ear without spontaneous rupture of tympanic membrane, recurrence not specified    -  1    Dysfunction of left eustachian tube          Care Instructions    Follow up if your symptoms do not improve or worsen after your antibiotic course. Please call for a prescription if you develop vaginal itchiness or discharge from your antibiotic use.      Schedule with ENT as soon as possible, may book out 3-4 weeks.               Follow-ups after your visit        Additional Services     OTOLARYNGOLOGY REFERRAL       Your provider has referred you to: [facility]: [facility] [phone]   [email or web address]    Please be aware that coverage of these services is subject to the terms and limitations of your health insurance plan.  Call member services at your health plan with any benefit or coverage questions.      Please bring the following with you to your appointment:    (1) Any X-Rays, CTs or MRIs which have been performed.  Contact the facility where they were done to arrange for  prior to your scheduled appointment.   (2) List of current medications  (3) This referral request   (4) Any documents/labs given to you for this referral                  Your next 10 appointments already scheduled     Aug 10, 2018  9:00 AM CDT   Return Visit with Vero Prado 2, RD   [facility] ([facility])    [address] [place] [id number] [phone]            Aug 10, 2018  9:30 AM CDT   Return Visit with Esthela Simeon PA-C   [facility]  - [place] ([facility])    [address] [place] [address] [address]  [place] [address] [phone]              Who to contact     If you have questions or need follow up information about today's clinic visit or your schedule please contact [facility] directly at [phone].  Normal or non-critical lab and imaging results will be communicated to you by MyChart, letter or phone within 4 business days after the clinic has received the results. If you do not hear from us within 7 days, please contact the clinic through MyChart or phone. If you have a critical or abnormal lab result, we will notify you by phone as soon as possible.  Submit refill requests through sa[facility]c or call your pharmacy and they will forward the refill request to us. Please allow 3 business days for your refill to be completed.          Additional Information About Your Visit        Care EveryWhere ID     This is your Care EveryWhere ID. This could be used by other organizations to access your [facility] medical records  [id number]        Your Vitals Were     Pulse Temperature Respirations Last Period BMI (Body Mass Index)       68 98.4  F (36.9  C) (Temporal) 12 05/08/2018 (Approximate) 31.28 kg/m2        Blood Pressure from Last 3 Encounters:   06/25/18 100/60   05/14/18 109/65   05/03/18 100/62    Weight from Last 3 Encounters:   06/25/18 188 lb (85.3 kg)   05/14/18 197 lb 8 oz (89.6 kg)   05/14/18 197 lb 8 oz (89.6 kg)              We Performed the Following     OTOLARYNGOLOGY REFERRAL          Today's Medication Changes          These changes are accurate as of 6/25/18  4:57 PM.  If you have any questions, ask your nurse or doctor.               Start taking these medicines.        Dose/Directions    amoxicillin 500 MG capsule   Commonly known as:  AMOXIL   Used for:  Acute suppurative otitis media of left ear without spontaneous rupture of tympanic membrane, recurrence not specified   Started by:   Yari Rangel MD        Dose:  500 mg   Take 1 capsule (500 mg) by mouth 3 times daily   Quantity:  30 capsule   Refills:  0            Where to get your medicines      These medications were sent to [id number] - [place], [place] - [address]  [address] [address]    Hours:  test Rx sent successfully 12/26/02  KR Phone:  [phone]     amoxicillin 500 MG capsule                Primary Care Provider Office Phone # Fax #    Jody Bendern, APRN [facility] [phone] [address] [facility] [place] [facility] [address]        Equal Access to Services     [facility] CenterWILLIAM : Hadii aad ku hadasho Soomaali, waaxda luqadaha, qaybta kaalmada adeegyada, waxay regina cárdenas . So [facility] [phone].    ATENCIÓN: Si habla español, tiene a tylre disposición servicios gratuitos de asistencia lingüística. Llame al [phone].    We comply with applicable federal civil rights laws and [place] laws. We do not discriminate on the basis of race, color, national origin, age, disability, sex, sexual orientation, or gender identity.            Thank you!     Thank you for choosing [facility]  for your care. Our goal is always to provide you with excellent care. Hearing back from our patients is one way we can continue to improve our services. Please take a few minutes to complete the written survey that you may receive in the mail after your visit with us. Thank you!             Your Updated Medication List - Protect others around you: Learn how to safely use, store and throw away your medicines at www.disposemymeds.org.          This list is accurate as of 6/25/18  4:57 PM.  Always use your most recent med list.                   Brand Name Dispense Instructions for use Diagnosis    amoxicillin 500 MG capsule    AMOXIL    30 capsule    Take 1 capsule (500 mg) by mouth 3 times daily    Acute suppurative otitis media of left ear without spontaneous rupture of  tympanic membrane, recurrence not specified       etonogestrel-ethinyl estradiol 0.12-0.015 MG/24HR vaginal ring    NUVARING    1 each    Insert 1 ring vaginally every 21 days then remove for 1 week then repeat with new ring.    Encounter for surveillance of vaginal ring hormonal contraceptive device       fluticasone 50 MCG/ACT spray    FLONASE    1 Bottle    Spray 1-2 sprays into both nostrils daily    Dysfunction of left eustachian tube       multivitamin  peds with iron 60 MG chewable tablet      Take 2 chew tab by mouth 2 times daily        MULTIVITAMIN ADULT PO      Take 1 capsule by mouth daily        omeprazole 20 MG tablet    priLOSEC OTC    90 tablet    Take 1 tablet (20 mg) by mouth daily    Bariatric surgery status, GERD without esophagitis       triamcinolone 0.1 % cream    KENALOG    80 g    Apply sparingly to affected area two - three times daily max of 14 days.    Rash and nonspecific skin eruption       VIACTIV PO      Take 1 chew tab by mouth 3 times daily (with meals)        VITAMIN B12 PO      Take 5,000 mcg by mouth once a week        VITAMIN D (CHOLECALCIFEROL) PO      Take 5,000 Units by mouth daily

## 2018-06-25 NOTE — PATIENT INSTRUCTIONS
Follow up if your symptoms do not improve or worsen after your antibiotic course. Please call for a prescription if you develop vaginal itchiness or discharge from your antibiotic use.      Schedule with ENT as soon as possible, may book out 3-4 weeks.

## 2018-07-05 NOTE — TELEPHONE ENCOUNTER
I formulated a note before my vacation but I think it might have been routed incorrectly.  I re routed it today.  It is in the letter tab as well    Thanks.    Esthela

## 2018-07-06 NOTE — TELEPHONE ENCOUNTER
Medication Appeal Initiation    We have initiated an appeal for the requested medication:  Medication: Omeprazole 20 MG  Appeal Start Date:  7/6/2018  Insurance Company: [employer] - Phone [phone] Fax [phone]  Comments:       Letter of medical necessity sent with clinical information to [employer] [phone]

## 2018-07-09 ENCOUNTER — TELEPHONE (OUTPATIENT)
Dept: FAMILY MEDICINE | Facility: OTHER | Age: 33
End: 2018-07-09

## 2018-07-09 NOTE — TELEPHONE ENCOUNTER
You placed a referral for patient to ENT on 6/25/18.  Patient has not scheduled as of yet.      Please review and forward to team if follow up with the patient is needed.     Thank you!  Leila/Clinic Referrals Dyad II

## 2018-07-12 NOTE — TELEPHONE ENCOUNTER
MEDICATION APPEAL APPROVED    Medication: Omeprazole 20 MG Appeal Approved   Authorization Effective Date:  6/18/19   Authorization Expiration Date: 7/11/19   Approved Dose/Quantity:   Reference #:     Insurance Company: [facility] - Phone [phone] Fax [phone]  Expected CoPay:       CoPay Card Available:      Foundation Assistance Needed:    Which Pharmacy is filling the prescription (Not needed for infusion/clinic administered): [id number] - [place], [place] - [address]

## 2018-07-31 NOTE — PROGRESS NOTES
"  SUBJECTIVE:   Geneva Cooley is a 32 year old female who presents to clinic today for the following health issues:    HPI     Concern - Left ear pain, follow up  Onset: 2 years ago    Description:   Left ear, feels like she is under water, comes and goes, no pain    Intensity: severe    Progression of Symptoms: same, intermittent    Accompanying Signs & Symptoms:  None    Previous history of similar problem:   yes    Precipitating factors:   Worsened by: none    Alleviating factors:  Improved by: None    Therapies Tried and outcome: Amoxicillin did not help symptoms. Tried Flonase and made symptoms worse. Has not tried Sudafed.      Problem list and histories reviewed & adjusted, as indicated.  Additional history: as documented    Labs reviewed in EPIC    ROS:  Constitutional, HEENT, cardiovascular, pulmonary, GI, , musculoskeletal, neuro, skin, endocrine and psych systems are negative, except as otherwise noted.    OBJECTIVE:     /68  Pulse 60  Temp 97.9  F (36.6  C) (Temporal)  Resp 16  Ht 5' 5\" (1.651 m)  Wt 178 lb 12.8 oz (81.1 kg)  BMI 29.75 kg/m2  Body mass index is 29.75 kg/(m^2).  GENERAL: alert and no acute distress  EYES: Eyes grossly normal to inspection, PERRL and conjunctivae and sclerae normal  HENT: bilateral TM with clear effusion, small amount of milky fluid at bottom of TM on left. No erythema. Ear canals normal.   NECK: mild right tonsillar adenopathy, no asymmetry, masses, or scars and thyroid normal to palpation  MS: no gross musculoskeletal defects noted,   PSYCH: mentation appears normal, affect normal/bright    Diagnostic Test Results:  none     ASSESSMENT/PLAN:     1. Other chronic nonsuppurative otitis media of left ear  Clear effusion bilaterally, right actually worse than left, but only left side is symptomatic. Discussed oral steroid to see if this helps eustachian tube drain. Discussed potential side effects of steroid with patient and given her recent gastric sleeve " surgery and wanting to avoid anything that increases her appetite, she is going to hold off on trying the steroid. Recommend seeing ENT for consult and treatment options.   - OTOLARYNGOLOGY REFERRAL  - methylPREDNISolone (MEDROL DOSEPAK) 4 MG tablet; Follow package instructions  Dispense: 21 tablet; Refill: 0    CLAIRE [facility]

## 2018-08-03 ENCOUNTER — OFFICE VISIT (OUTPATIENT)
Dept: FAMILY MEDICINE | Facility: OTHER | Age: 33
End: 2018-08-03
Payer: COMMERCIAL

## 2018-08-03 VITALS
HEIGHT: 65 IN | TEMPERATURE: 97.9 F | BODY MASS INDEX: 29.79 KG/M2 | WEIGHT: 178.8 LBS | HEART RATE: 60 BPM | SYSTOLIC BLOOD PRESSURE: 106 MMHG | RESPIRATION RATE: 16 BRPM | DIASTOLIC BLOOD PRESSURE: 68 MMHG

## 2018-08-03 DIAGNOSIS — H65.492 OTHER CHRONIC NONSUPPURATIVE OTITIS MEDIA OF LEFT EAR: Primary | ICD-10-CM

## 2018-08-03 PROCEDURE — 99213 OFFICE O/P EST LOW 20 MIN: CPT | Performed by: STUDENT IN AN ORGANIZED HEALTH CARE EDUCATION/TRAINING PROGRAM

## 2018-08-03 RX ORDER — METHYLPREDNISOLONE 4 MG
TABLET, DOSE PACK ORAL
Qty: 21 TABLET | Refills: 0 | Status: SHIPPED | OUTPATIENT
Start: 2018-08-03 | End: 2018-12-03

## 2018-08-03 NOTE — MR AVS SNAPSHOT
After Visit Summary   8/3/2018    Geneva Cooley    MRN: [id number]           Patient Information     Date Of Birth          1985        Visit Information        Provider Department      8/3/2018 9:40 AM Jody Coughlin APRN [facility]        Today's Diagnoses     Other chronic nonsuppurative otitis media of left ear    -  1      Care Instructions    Oral steroid - medrol dose pack - take as directed.    ENT referral - call to set up appointment with Dr. Banegas at the [facility].    SILVIA LoboC            Follow-ups after your visit        Additional Services     OTOLARYNGOLOGY REFERRAL       Your provider has referred you to: [facility]: [facility] [phone]   [email or web address]/    Please be aware that coverage of these services is subject to the terms and limitations of your health insurance plan.  Call member services at your health plan with any benefit or coverage questions.      Please bring the following with you to your appointment:    (1) Any X-Rays, CTs or MRIs which have been performed.  Contact the facility where they were done to arrange for  prior to your scheduled appointment.   (2) List of current medications  (3) This referral request   (4) Any documents/labs given to you for this referral                  Your next 10 appointments already scheduled     Aug 10, 2018  9:00 AM CDT   Return Visit with Vero Garrett Diet 2, RD   [facility] ([facility])    [address] [place] [id number] [phone]            Aug 10, 2018  9:30 AM CDT   Return Visit with Esthela Simeon PA-C   [facility] ([facility])    [address] [place] [id number] [phone]              Who to contact     If you have  "questions or need follow up information about today's clinic visit or your schedule please contact [facility] directly at [phone].  Normal or non-critical lab and imaging results will be communicated to you by MyChart, letter or phone within 4 business days after the clinic has received the results. If you do not hear from us within 7 days, please contact the clinic through MyChart or phone. If you have a critical or abnormal lab result, we will notify you by phone as soon as possible.  Submit refill requests through [employer] or call your pharmacy and they will forward the refill request to us. Please allow 3 business days for your refill to be completed.          Additional Information About Your Visit        Care EveryWhere ID     This is your Care EveryWhere ID. This could be used by other organizations to access your [facility] medical records  [id number]        Your Vitals Were     Pulse Temperature Respirations Height BMI (Body Mass Index)       60 97.9  F (36.6  C) (Temporal) 16 5' 5\" (1.651 m) 29.75 kg/m2        Blood Pressure from Last 3 Encounters:   08/03/18 106/68   06/25/18 100/60   05/14/18 109/65    Weight from Last 3 Encounters:   08/03/18 178 lb 12.8 oz (81.1 kg)   06/25/18 188 lb (85.3 kg)   05/14/18 197 lb 8 oz (89.6 kg)              We Performed the Following     OTOLARYNGOLOGY REFERRAL          Today's Medication Changes          These changes are accurate as of 8/3/18 10:14 AM.  If you have any questions, ask your nurse or doctor.               Start taking these medicines.        Dose/Directions    methylPREDNISolone 4 MG tablet   Commonly known as:  MEDROL DOSEPAK   Used for:  Other chronic nonsuppurative otitis media of left ear   Started by:  Jody Coughlin APRN CNP        Follow package instructions   Quantity:  21 tablet   Refills:  0            Where to get your medicines      These medications were sent to [id number] - [place], [place] - [address] " [facility]  [address] [facility], [facility] [address]    Hours:  test Rx sent successfully 12/26/02  KR Phone:  [phone]     methylPREDNISolone 4 MG tablet                Primary Care Provider Office Phone # Fax #    CLAIRE Luna [phone] [address] [facility] [address]        Equal Access to Services     RE CYR : Hadii aad ku hadasho Soomaali, waaxda luqadaha, qaybta kaalmada adeegyada, waxay idiin hayaan adeeg kharash la'aan ah. So [facility] [phone].    ATENCIÓN: Si heraclio griffin, tiene a tyler disposición servicios gratuitos de asistencia lingüística. Llame al [phone].    We comply with applicable federal civil rights laws and [place] laws. We do not discriminate on the basis of race, color, national origin, age, disability, sex, sexual orientation, or gender identity.            Thank you!     Thank you for choosing [facility]  for your care. Our goal is always to provide you with excellent care. Hearing back from our patients is one way we can continue to improve our services. Please take a few minutes to complete the written survey that you may receive in the mail after your visit with us. Thank you!             Your Updated Medication List - Protect others around you: Learn how to safely use, store and throw away your medicines at www.disposemymeds.org.          This list is accurate as of 8/3/18 10:14 AM.  Always use your most recent med list.                   Brand Name Dispense Instructions for use Diagnosis    BIOTIN 5000 PO           etonogestrel-ethinyl estradiol 0.12-0.015 MG/24HR vaginal ring    NUVARING    1 each    Insert 1 ring vaginally every 21 days then remove for 1 week then repeat with new ring.    Encounter for surveillance of vaginal ring hormonal contraceptive device       fluticasone 50 MCG/ACT spray    FLONASE    1 Bottle    Spray 1-2 sprays into both nostrils daily    Dysfunction of left eustachian tube        methylPREDNISolone 4 MG tablet    MEDROL DOSEPAK    21 tablet    Follow package instructions    Other chronic nonsuppurative otitis media of left ear       omeprazole 20 MG tablet    priLOSEC OTC    90 tablet    Take 1 tablet (20 mg) by mouth daily    Bariatric surgery status, GERD without esophagitis       triamcinolone 0.1 % cream    KENALOG    80 g    Apply sparingly to affected area two - three times daily max of 14 days.    Rash and nonspecific skin eruption       VIACTIV PO      Take 1 chew tab by mouth 3 times daily (with meals)        VITAMIN B12 PO      Take 5,000 mcg by mouth once a week        VITAMIN D (CHOLECALCIFEROL) PO      Take 5,000 Units by mouth daily        WOMENS MULTIVITAMIN PO

## 2018-08-03 NOTE — PATIENT INSTRUCTIONS
Oral steroid - medrol dose pack - take as directed.    ENT referral - call to set up appointment with Dr. Banegas at the [facility].    ADAN Lobo

## 2018-08-06 ENCOUNTER — TELEPHONE (OUTPATIENT)
Dept: FAMILY MEDICINE | Facility: OTHER | Age: 33
End: 2018-08-06

## 2018-08-06 NOTE — TELEPHONE ENCOUNTER
Reason for Call:  Other appointment    Detailed comments: pt wondering about being worked in before her 8/29 appt. Was hoping before the 13th so she doesn't have to take work off. Please advise. Ref by Mary Coughlin.     Phone Number Patient can be reached at: Home number on file [phone] (home)    Best Time: any     Can we leave a detailed message on this number? YES    Call taken on 8/6/2018 at 5:38 PM by Kelly Fuentes

## 2018-08-29 ENCOUNTER — OFFICE VISIT (OUTPATIENT)
Dept: AUDIOLOGY | Facility: OTHER | Age: 33
End: 2018-08-29
Payer: COMMERCIAL

## 2018-08-29 ENCOUNTER — OFFICE VISIT (OUTPATIENT)
Dept: OTOLARYNGOLOGY | Facility: OTHER | Age: 33
End: 2018-08-29
Attending: STUDENT IN AN ORGANIZED HEALTH CARE EDUCATION/TRAINING PROGRAM
Payer: COMMERCIAL

## 2018-08-29 VITALS
HEART RATE: 65 BPM | SYSTOLIC BLOOD PRESSURE: 112 MMHG | BODY MASS INDEX: 29.66 KG/M2 | HEIGHT: 65 IN | DIASTOLIC BLOOD PRESSURE: 72 MMHG | WEIGHT: 178 LBS | OXYGEN SATURATION: 99 %

## 2018-08-29 DIAGNOSIS — H90.3 SENSORINEURAL HEARING LOSS (SNHL) OF BOTH EARS: Primary | ICD-10-CM

## 2018-08-29 DIAGNOSIS — H69.92 DYSFUNCTION OF LEFT EUSTACHIAN TUBE: Primary | ICD-10-CM

## 2018-08-29 PROCEDURE — 99207 ZZC NO CHARGE LOS: CPT | Performed by: AUDIOLOGIST

## 2018-08-29 PROCEDURE — 92567 TYMPANOMETRY: CPT | Performed by: AUDIOLOGIST

## 2018-08-29 PROCEDURE — 92511 NASOPHARYNGOSCOPY: CPT | Performed by: OTOLARYNGOLOGY

## 2018-08-29 PROCEDURE — 92557 COMPREHENSIVE HEARING TEST: CPT | Performed by: AUDIOLOGIST

## 2018-08-29 PROCEDURE — 99243 OFF/OP CNSLTJ NEW/EST LOW 30: CPT | Mod: 25 | Performed by: OTOLARYNGOLOGY

## 2018-08-29 NOTE — PROGRESS NOTES
AUDIOLOGY REPORT: HEARING EXAM    SUBJECTIVE:  Geneva Cooley is a 32 year old female referred to audiology from ENT by Dr. Banegas for a hearing examination. Patient reports occasional aural fullness in her left ear for the past 2 years following an ear infection on that side. She also reports history of hazardous noise exposure.    OBJECTIVE:    Otoscopy:   RIGHT: clear ear canal   LEFT:  clear ear canal    Tympanometry:   RIGHT:  normal eardrum mobility   LEFT:   normal eardrum mobility    Thresholds:   Pure Tone Thresholds assessed using standard techniques with good  reliability from 250-8000 Hz bilaterally using circumaural headphones.   RIGHT:  slight sensorineural hearing loss   LEFT:   slight sensorineural hearing loss    Speech Reception Threshold:   RIGHT:  15 dB HL   LEFT:    15 dB HL    Word Recognition Score:    RIGHT:  100% at 50 dB HL using NU-6 recorded word list   LEFT:    100% at 50 dB HL using NU-6 recorded word list    ASSESSMENT:  Sensorineural hearing loss of both ears    Discussed results with the patient.     PLAN:  Patient was returned to ENT for follow up.     Irina Albert.  Licensed Audiologist, [place] #[id number]  [facility]  8/29/2018

## 2018-08-29 NOTE — PROGRESS NOTES
ENT Consultation    Geneva Cooley is a 32 year old female who is seen in consultation at the request of Jody Coughlin  .      History of Present Illness - Geneva Cooley is a 32 year old female here today for fluid in both ears.   For the base of the problem started 2 years ago.  She remembers having severe ear infection involving her left ear.  The problem is only on the left not affecting the right.  She has been told she had fluid.  They have tried different treatments.  Effect fluticasone made his symptoms worse.  She also had bariatric surgery but in February of this year lost little weight.  However symptoms started before that.  She denies any other symptoms consistent with a patulous eustachian tube such as reverberation such as need to sniff to keep the symptoms down.  She feels pressure plugging when she is flying an airplane.  It does not happen all the time but couple times a week.  Effect was happening today for short period time did not get relieved but when it happens it is very uncomfortable for the patient.  She denies any nasal issues and difficulty with nasal breathing any allergies.  Denies any problems with TMD or TMJ.  Body mass index is 29.62 kg/(m^2).        BP Readings from Last 1 Encounters:   08/29/18 112/72       BP noted to be well controlled today in office.     Geneva IS NOT a smoker/uses chewing tobacco.     Past Medical History -   Past Medical History:   Diagnosis Date     Depression      Shingles outbreak 2012       Current Medications -   Current Outpatient Prescriptions:      BIOTIN 5000 PO, , Disp: , Rfl:      Calcium-Vitamin D-Vitamin K (VIACTIV PO), Take 1 chew tab by mouth 3 times daily (with meals) , Disp: , Rfl:      Cyanocobalamin (VITAMIN B12 PO), Take 5,000 mcg by mouth once a week, Disp: , Rfl:      etonogestrel-ethinyl estradiol (NUVARING) 0.12-0.015 MG/24HR vaginal ring, Insert 1 ring vaginally every 21 days then remove for 1 week then repeat with new ring., Disp: 1  "each, Rfl: 5     methylPREDNISolone (MEDROL DOSEPAK) 4 MG tablet, Follow package instructions, Disp: 21 tablet, Rfl: 0     Multiple Vitamins-Minerals (WOMENS MULTIVITAMIN PO), , Disp: , Rfl:      omeprazole (PRILOSEC OTC) 20 MG tablet, Take 1 tablet (20 mg) by mouth daily, Disp: 90 tablet, Rfl: 1     VITAMIN D, CHOLECALCIFEROL, PO, Take 5,000 Units by mouth daily, Disp: , Rfl:     Allergies -   Allergies   Allergen Reactions     Vicodin [Hydrocodone-Acetaminophen] Hives       Social History -   Social History     Social History     Marital status: Single     Spouse name: N/A     Number of children: N/A     Years of education: N/A     Occupational History      [employer]     school bus diver     Social History Main Topics     Smoking status: Never Smoker     Smokeless tobacco: Never Used      Comment: smoked at age 17      Alcohol use No      Comment: rarely     Drug use: No     Sexual activity: Not Currently     Partners: Male     Birth control/ protection: Condom, Inserts/Ring     Other Topics Concern     Parent/Sibling W/ Cabg, Mi Or Angioplasty Before 65f 55m? Yes     Dad- Heartattack, Mom- Stroke     Social History Narrative       Family History -   Family History   Problem Relation Age of Onset     Hypertension Mother      Diabetes Mother      Cerebrovascular Disease Mother 46     aneurysm     Lipids Mother      Depression Mother      Breast Cancer Mother 60     Hypertension Father      Arthritis Father      HEART DISEASE Father      MI in late 40's      Lipids Father      Depression Sister      Hypertension Sister      Hypertension Brother        Review of Systems - As per HPI and PMHx, otherwise review of system review of the head and neck negative.    Physical Exam  /72  Pulse 65  Ht 1.651 m (5' 5\")  Wt 80.7 kg (178 lb)  SpO2 99%  BMI 29.62 kg/m2  BMI: Body mass index is 29.62 kg/(m^2).    General - The patient is well nourished and well developed, and appears to have good nutritional " status.  Alert and oriented to person and place, answers questions and cooperates with examination appropriately.    SKIN - No suspicious lesions or rashes.  Respiration - No respiratory distress.     Head and Face - Normocephalic and atraumatic, with no gross asymmetry noted of the contour of the facial features.  The facial nerve is intact, with strong symmetric movements.    Voice and Breathing - The patient was breathing comfortably without the use of accessory muscles. There was no wheezing, stridor, or stertor.  The patients voice was clear and strong, and had appropriate pitch and quality.    Ears - Bilateral pinna and EACs with normal appearing overlying skin. Tympanic membrane intact with good mobility on pneumatic otoscopy bilaterally. Bony landmarks of the ossicular chain are normal. The tympanic membranes are normal in appearance. No retraction, perforation, or masses.  No fluid or purulence was seen in the external canal or the middle ear.     Eyes - Extraocular movements intact.  Sclera were not icteric or injected, conjunctiva were pink and moist.    Mouth - Examination of the oral cavity showed pink, healthy oral mucosa. No lesions or ulcerations noted.  The tongue was mobile and midline, and the dentition were in good condition.      Throat - The walls of the oropharynx were smooth, pink, moist, symmetric, and had no lesions or ulcerations.  The tonsillar pillars and soft palate were symmetric.  The uvula was midline on elevation.    Neck - Normal midline excursion of the laryngotracheal complex during swallowing.  Full range of motion on passive movement.  Palpation of the occipital, submental, submandibular, internal jugular chain, and supraclavicular nodes did not demonstrate any abnormal lymph nodes or masses.  The carotid pulse was palpable bilaterally.  Palpation of the thyroid was soft and smooth, with no nodules or goiter appreciated.  The trachea was mobile and midline.    Nose - External  contour is symmetric, no gross deflection or scars.  Nasal mucosa is pink and moist with no abnormal mucus.  The septum was midline with mild septal deviation to the left side but non-obstructive, turbinates of normal size and position.  No polyps, masses, or purulence noted on examination.    Neuro - Nonfocal neuro exam is normal, CN 2 through 12 intact, normal gait and muscle tone.    Performed in clinic today:  To further evaluate the nasal cavity, I performed rigid nasal endoscopy.  I first sprayed the nasal cavity bilaterally with a mix of lidocaine and neosynephrine.  I then began on the left side using a 2.7mm, 30 degree rigid nasal endoscope.  The septum was deviated and the nasal airway was open.  No abnormal secretions, purulence, or polyps were noted. The left middle turbinate and middle meatus were clearly visualized and normal in appearance.  Looking up, the olfactory cleft was unobstructed.  Going further back, the sphenoethmoid recess was normal in appearance, with healthy appearing mucosa on the face of the sphenoid.  The nasopharynx was unremarkable, and the eustachian tube opening on this side was unobstructed.  Eustachian tube orifice appears to be clear no masses lesions around it fossa of Rosenmuller quite clear.    I then turned my attention to the right side.  Once again, the septum was deviated, and the airway was open.  No abnormal secretions, purulence, polyps were noted.  The right middle turbinate and middle meatus were clearly visualized and normal in appearance.  Looking up, the olfactory cleft was unobstructed.  Going further back the right sphenoethmoid recess was normal in appearance, and eustachian tube opening was unobstructed.Eustachian tube orifice appears to be clear no masses lesions around it fossa of Rosenmuller quite clear.     Blue - [id number] Mytamed          A/P - Geneva NORRIS Yasir is a 32 year old female with eustachian tube dysfunction symptoms.  We discussed different  treatment options patient is opting for left myringotomy tube placement and is understanding its risks benefits potential perforation but this persistent potential worsening of the symptoms.  She wished to proceed with it.  Again at this point there are no indications of patulous eustachian tube symptoms.  We will scheduled accordingly based on a discussion.      Osmani Banegas MD

## 2018-08-29 NOTE — LETTER
8/29/2018         RE: Geneva Cooley  [address] [place] [address]  [facility] [address]        Dear Colleague,    Thank you for referring your patient, Geneva Cooley, to the [facility]. Please see a copy of my visit note below.    ENT Consultation    Geneva Cooley is a 32 year old female who is seen in consultation at the request of Jody Coughlin  .      History of Present Illness - Geneva Cooley is a 32 year old female here today for fluid in both ears.   For the base of the problem started 2 years ago.  She remembers having severe ear infection involving her left ear.  The problem is only on the left not affecting the right.  She has been told she had fluid.  They have tried different treatments.  Effect fluticasone made his symptoms worse.  She also had bariatric surgery but in February of this year lost little weight.  However symptoms started before that.  She denies any other symptoms consistent with a patulous eustachian tube such as reverberation such as need to sniff to keep the symptoms down.  She feels pressure plugging when she is flying an airplane.  It does not happen all the time but couple times a week.  Effect was happening today for short period time did not get relieved but when it happens it is very uncomfortable for the patient.  She denies any nasal issues and difficulty with nasal breathing any allergies.  Denies any problems with TMD or TMJ.  Body mass index is 29.62 kg/(m^2).        BP Readings from Last 1 Encounters:   08/29/18 112/72       BP noted to be well controlled today in office.     Geneva IS NOT a smoker/uses chewing tobacco.     Past Medical History -   Past Medical History:   Diagnosis Date     Depression      Shingles outbreak 2012       Current Medications -   Current Outpatient Prescriptions:      BIOTIN 5000 PO, , Disp: , Rfl:      Calcium-Vitamin D-Vitamin K (VIACTIV PO), Take 1 chew tab by mouth 3 times daily (with meals) , Disp: , Rfl:       Cyanocobalamin (VITAMIN B12 PO), Take 5,000 mcg by mouth once a week, Disp: , Rfl:      etonogestrel-ethinyl estradiol (NUVARING) 0.12-0.015 MG/24HR vaginal ring, Insert 1 ring vaginally every 21 days then remove for 1 week then repeat with new ring., Disp: 1 each, Rfl: 5     methylPREDNISolone (MEDROL DOSEPAK) 4 MG tablet, Follow package instructions, Disp: 21 tablet, Rfl: 0     Multiple Vitamins-Minerals (WOMENS MULTIVITAMIN PO), , Disp: , Rfl:      omeprazole (PRILOSEC OTC) 20 MG tablet, Take 1 tablet (20 mg) by mouth daily, Disp: 90 tablet, Rfl: 1     VITAMIN D, CHOLECALCIFEROL, PO, Take 5,000 Units by mouth daily, Disp: , Rfl:     Allergies -   Allergies   Allergen Reactions     Vicodin [Hydrocodone-Acetaminophen] Hives       Social History -   Social History     Social History     Marital status: Single     Spouse name: N/A     Number of children: N/A     Years of education: N/A     Occupational History      [employer]     school bus diver     Social History Main Topics     Smoking status: Never Smoker     Smokeless tobacco: Never Used      Comment: smoked at age 17      Alcohol use No      Comment: rarely     Drug use: No     Sexual activity: Not Currently     Partners: Male     Birth control/ protection: Condom, Inserts/Ring     Other Topics Concern     Parent/Sibling W/ Cabg, Mi Or Angioplasty Before 65f 55m? Yes     Dad- Heartattack, Mom- Stroke     Social History Narrative       Family History -   Family History   Problem Relation Age of Onset     Hypertension Mother      Diabetes Mother      Cerebrovascular Disease Mother 46     aneurysm     Lipids Mother      Depression Mother      Breast Cancer Mother 60     Hypertension Father      Arthritis Father      HEART DISEASE Father      MI in late 40's      Lipids Father      Depression Sister      Hypertension Sister      Hypertension Brother        Review of Systems - As per HPI and PMHx, otherwise review of system review of the head and neck  "negative.    Physical Exam  /72  Pulse 65  Ht 1.651 m (5' 5\")  Wt 80.7 kg (178 lb)  SpO2 99%  BMI 29.62 kg/m2  BMI: Body mass index is 29.62 kg/(m^2).    General - The patient is well nourished and well developed, and appears to have good nutritional status.  Alert and oriented to person and place, answers questions and cooperates with examination appropriately.    SKIN - No suspicious lesions or rashes.  Respiration - No respiratory distress.     Head and Face - Normocephalic and atraumatic, with no gross asymmetry noted of the contour of the facial features.  The facial nerve is intact, with strong symmetric movements.    Voice and Breathing - The patient was breathing comfortably without the use of accessory muscles. There was no wheezing, stridor, or stertor.  The patients voice was clear and strong, and had appropriate pitch and quality.    Ears - Bilateral pinna and EACs with normal appearing overlying skin. Tympanic membrane intact with good mobility on pneumatic otoscopy bilaterally. Bony landmarks of the ossicular chain are normal. The tympanic membranes are normal in appearance. No retraction, perforation, or masses.  No fluid or purulence was seen in the external canal or the middle ear.     Eyes - Extraocular movements intact.  Sclera were not icteric or injected, conjunctiva were pink and moist.    Mouth - Examination of the oral cavity showed pink, healthy oral mucosa. No lesions or ulcerations noted.  The tongue was mobile and midline, and the dentition were in good condition.      Throat - The walls of the oropharynx were smooth, pink, moist, symmetric, and had no lesions or ulcerations.  The tonsillar pillars and soft palate were symmetric.  The uvula was midline on elevation.    Neck - Normal midline excursion of the laryngotracheal complex during swallowing.  Full range of motion on passive movement.  Palpation of the occipital, submental, submandibular, internal jugular chain, and " supraclavicular nodes did not demonstrate any abnormal lymph nodes or masses.  The carotid pulse was palpable bilaterally.  Palpation of the thyroid was soft and smooth, with no nodules or goiter appreciated.  The trachea was mobile and midline.    Nose - External contour is symmetric, no gross deflection or scars.  Nasal mucosa is pink and moist with no abnormal mucus.  The septum was midline with mild septal deviation to the left side but non-obstructive, turbinates of normal size and position.  No polyps, masses, or purulence noted on examination.    Neuro - Nonfocal neuro exam is normal, CN 2 through 12 intact, normal gait and muscle tone.    Performed in clinic today:  To further evaluate the nasal cavity, I performed rigid nasal endoscopy.  I first sprayed the nasal cavity bilaterally with a mix of lidocaine and neosynephrine.  I then began on the left side using a 2.7mm, 30 degree rigid nasal endoscope.  The septum was deviated and the nasal airway was open.  No abnormal secretions, purulence, or polyps were noted. The left middle turbinate and middle meatus were clearly visualized and normal in appearance.  Looking up, the olfactory cleft was unobstructed.  Going further back, the sphenoethmoid recess was normal in appearance, with healthy appearing mucosa on the face of the sphenoid.  The nasopharynx was unremarkable, and the eustachian tube opening on this side was unobstructed.  Eustachian tube orifice appears to be clear no masses lesions around it fossa of Rosenmuller quite clear.    I then turned my attention to the right side.  Once again, the septum was deviated, and the airway was open.  No abnormal secretions, purulence, polyps were noted.  The right middle turbinate and middle meatus were clearly visualized and normal in appearance.  Looking up, the olfactory cleft was unobstructed.  Going further back the right sphenoethmoid recess was normal in appearance, and eustachian tube opening was  unobstructed.Eustachian tube orifice appears to be clear no masses lesions around it fossa of Rosenmuller quite clear.     Blue - [id number] Mytamed          A/P - Geneva JR Cooley is a 32 year old female with eustachian tube dysfunction symptoms.  We discussed different treatment options patient is opting for left myringotomy tube placement and is understanding its risks benefits potential perforation but this persistent potential worsening of the symptoms.  She wished to proceed with it.  Again at this point there are no indications of patulous eustachian tube symptoms.  We will scheduled accordingly based on a discussion.      Osmani Banegas MD    Again, thank you for allowing me to participate in the care of your patient.        Sincerely,        Osmani Banegas MD, MD

## 2018-08-29 NOTE — MR AVS SNAPSHOT
"              After Visit Summary   8/29/2018    Geneva Cooley    MRN: [id number]           Patient Information     Date Of Birth          1985        Visit Information        Provider Department      8/29/2018 4:30 PM Osmani Banegas MD [facility]        Today's Diagnoses     Dysfunction of left eustachian tube    -  1       Follow-ups after your visit        Who to contact     If you have questions or need follow up information about today's clinic visit or your schedule please contact [facility] directly at [phone].  Normal or non-critical lab and imaging results will be communicated to you by MyChart, letter or phone within 4 business days after the clinic has received the results. If you do not hear from us within 7 days, please contact the clinic through MyChart or phone. If you have a critical or abnormal lab result, we will notify you by phone as soon as possible.  Submit refill requests through [employer] or call your pharmacy and they will forward the refill request to us. Please allow 3 business days for your refill to be completed.          Additional Information About Your Visit        Care EveryWhere ID     This is your Care EveryWhere ID. This could be used by other organizations to access your [place] medical records  [id number]        Your Vitals Were     Pulse Height Pulse Oximetry BMI (Body Mass Index)          65 1.651 m (5' 5\") 99% 29.62 kg/m2         Blood Pressure from Last 3 Encounters:   08/29/18 112/72   08/03/18 106/68   06/25/18 100/60    Weight from Last 3 Encounters:   08/29/18 80.7 kg (178 lb)   08/03/18 81.1 kg (178 lb 12.8 oz)   06/25/18 85.3 kg (188 lb)              We Performed the Following     NASOPHARYNGOSCOPY        Primary Care Provider Office Phone # Fax #    CLAIRE Luna [facility] [phone] [address] [address] [place] [address]        Equal Access to Services     RUBEN CYR AH: Hadii barbi patterson " darin Couch, wajose franciscoda lujustinadaha, qadonnellta katye moctezuma, oscar morganin hayaan melonyolayinka collinayanna laenllicorin adolfo. So [facility] [phone].    ATENCIÓN: Si habla gaby, tiene a tyler disposición servicios gratuitos de asistencia lingüística. Ana Luisa al [phone].    We comply with applicable federal civil rights laws and [place] laws. We do not discriminate on the basis of race, color, national origin, age, disability, sex, sexual orientation, or gender identity.            Thank you!     Thank you for choosing [facility]  for your care. Our goal is always to provide you with excellent care. Hearing back from our patients is one way we can continue to improve our services. Please take a few minutes to complete the written survey that you may receive in the mail after your visit with us. Thank you!             Your Updated Medication List - Protect others around you: Learn how to safely use, store and throw away your medicines at www.disposemymeds.org.          This list is accurate as of 8/29/18  5:26 PM.  Always use your most recent med list.                   Brand Name Dispense Instructions for use Diagnosis    BIOTIN 5000 PO           etonogestrel-ethinyl estradiol 0.12-0.015 MG/24HR vaginal ring    NUVARING    1 each    Insert 1 ring vaginally every 21 days then remove for 1 week then repeat with new ring.    Encounter for surveillance of vaginal ring hormonal contraceptive device       methylPREDNISolone 4 MG tablet    MEDROL DOSEPAK    21 tablet    Follow package instructions    Other chronic nonsuppurative otitis media of left ear       omeprazole 20 MG tablet    priLOSEC OTC    90 tablet    Take 1 tablet (20 mg) by mouth daily    Bariatric surgery status, GERD without esophagitis       VIACTIV PO      Take 1 chew tab by mouth 3 times daily (with meals)        VITAMIN B12 PO      Take 5,000 mcg by mouth once a week        VITAMIN D (CHOLECALCIFEROL) PO      Take 5,000 Units by mouth daily        WOMENS  MULTIVITAMIN PO

## 2018-08-29 NOTE — MR AVS SNAPSHOT
After Visit Summary   8/29/2018    Geneva Cooley    MRN: [id number]           Patient Information     Date Of Birth          1985        Visit Information        Provider Department      8/29/2018 4:00 PM Michel Dillard [facility]        Today's Diagnoses     Sensorineural hearing loss (SNHL) of both ears    -  1       Follow-ups after your visit        Who to contact     If you have questions or need follow up information about today's clinic visit or your schedule please contact [facility] directly at [phone].  Normal or non-critical lab and imaging results will be communicated to you by MyChart, letter or phone within 4 business days after the clinic has received the results. If you do not hear from us within 7 days, please contact the clinic through MyChart or phone. If you have a critical or abnormal lab result, we will notify you by phone as soon as possible.  Submit refill requests through [employer] or call your pharmacy and they will forward the refill request to us. Please allow 3 business days for your refill to be completed.          Additional Information About Your Visit        Care EveryWhere ID     This is your Care EveryWhere ID. This could be used by other organizations to access your [place] medical records  [id number]         Blood Pressure from Last 3 Encounters:   08/03/18 106/68   06/25/18 100/60   05/14/18 109/65    Weight from Last 3 Encounters:   08/03/18 178 lb 12.8 oz (81.1 kg)   06/25/18 188 lb (85.3 kg)   05/14/18 197 lb 8 oz (89.6 kg)              We Performed the Following     AUDIOGRAM/TYMPANOGRAM - INTERFACE     COMPREHENSIVE HEARING TEST     TYMPANOMETRY        Primary Care Provider Office Phone # Fax #    CLAIRE Luna [facility] [phone]       [address] [address] [place] [address]        Equal Access to Services     RUBEN CYR : Adam Couch, ghada santiago, boubacar  oscar lehmanolayinka cárdenas ah. Salma [facility] [phone].    ATENCIÓN: Si heraclio griffin, tiene a tyler disposición servicios gratuitos de asistencia lingüística. Ana Luisa al [phone].    We comply with applicable federal civil rights laws and [place] laws. We do not discriminate on the basis of race, color, national origin, age, disability, sex, sexual orientation, or gender identity.            Thank you!     Thank you for choosing [facility]  for your care. Our goal is always to provide you with excellent care. Hearing back from our patients is one way we can continue to improve our services. Please take a few minutes to complete the written survey that you may receive in the mail after your visit with us. Thank you!             Your Updated Medication List - Protect others around you: Learn how to safely use, store and throw away your medicines at www.disposemymeds.org.          This list is accurate as of 8/29/18  4:43 PM.  Always use your most recent med list.                   Brand Name Dispense Instructions for use Diagnosis    BIOTIN 5000 PO           etonogestrel-ethinyl estradiol 0.12-0.015 MG/24HR vaginal ring    NUVARING    1 each    Insert 1 ring vaginally every 21 days then remove for 1 week then repeat with new ring.    Encounter for surveillance of vaginal ring hormonal contraceptive device       methylPREDNISolone 4 MG tablet    MEDROL DOSEPAK    21 tablet    Follow package instructions    Other chronic nonsuppurative otitis media of left ear       omeprazole 20 MG tablet    priLOSEC OTC    90 tablet    Take 1 tablet (20 mg) by mouth daily    Bariatric surgery status, GERD without esophagitis       VIACTIV PO      Take 1 chew tab by mouth 3 times daily (with meals)        VITAMIN B12 PO      Take 5,000 mcg by mouth once a week        VITAMIN D (CHOLECALCIFEROL) PO      Take 5,000 Units by mouth daily        WOMENS MULTIVITAMIN PO

## 2018-12-03 ENCOUNTER — OFFICE VISIT (OUTPATIENT)
Dept: SURGERY | Facility: CLINIC | Age: 33
End: 2018-12-03
Payer: COMMERCIAL

## 2018-12-03 VITALS
BODY MASS INDEX: 28.11 KG/M2 | WEIGHT: 168.7 LBS | SYSTOLIC BLOOD PRESSURE: 116 MMHG | HEART RATE: 86 BPM | DIASTOLIC BLOOD PRESSURE: 64 MMHG | OXYGEN SATURATION: 98 % | HEIGHT: 65 IN | RESPIRATION RATE: 12 BRPM

## 2018-12-03 DIAGNOSIS — K21.9 GERD WITHOUT ESOPHAGITIS: ICD-10-CM

## 2018-12-03 DIAGNOSIS — Z98.84 BARIATRIC SURGERY STATUS: ICD-10-CM

## 2018-12-03 DIAGNOSIS — K91.2 POSTSURGICAL MALABSORPTION: ICD-10-CM

## 2018-12-03 PROCEDURE — 99214 OFFICE O/P EST MOD 30 MIN: CPT | Performed by: PHYSICIAN ASSISTANT

## 2018-12-03 RX ORDER — OMEPRAZOLE 20 MG/1
20 TABLET, DELAYED RELEASE ORAL DAILY
Qty: 30 TABLET | Refills: 0 | Status: SHIPPED | OUTPATIENT
Start: 2018-12-03 | End: 2019-04-19

## 2018-12-03 NOTE — PROGRESS NOTES
BARIATRIC FOLLOW UP VISIT     December 3, 2018       HISTORY OF PRESENT ILLNESS: Pt returns today for her follow-up appointment status post laparoscopic gastric sleeve.  She missed her 6 month follow up.  Is frustrated her weight loss has plateau ed.     Is a little concerned because she doesn't have restriction. Has no problem with carbonation.Tries not to have any.   Can eat more than a cup, can eat and drink at the same time.     Initial Weight: 252 lb (114.3 kg)   Current Weight: 168 lb 11.2 oz (76.5 kg)  Cumulative weight loss (lbs): 83.3  Last Visits Weight: 197 lb 12.8 oz (89.7 kg)     Patient is taking the following bariatric postoperative vitamins:  2 Complete multivitamins with minerals (at different times than calcium) - none because they cause her to vomit.  5000 International Units of Vitamin D daily  8761-1475 mg of Calcium daily in divided doses  1000 mcg of Vitamin B12 sl daily  1 Iron/Vit C. Daily- none, that causes burping     Pt is exercising by getting at least 10,000 steps daily.         OBESITY RELATED CONDITIONS:  Low back pain- significantly improved     SOCIAL HISTORY:  Pt denies smoking.  Pt denies alcohol use.  Avoids NSAIDS.  Is on a support group on [email or web address].  She is working two jobs as a  and a  for [employer].      REVIEW OF SYSTEMS:     GI:  Nausea-none  Vomiting-none  Diarrhea-none  Constipation-none  Dysphagia-none  Abdominal Pain-none  Heartburn-improved, does not need to take omeprazole daily.  Controlled if taking 20 mg prn.  Is taking it about once a week.  Can sometimes take ranitidine prn.      SKIN:  Intertriginous irritation-none  Hair Loss- none.  Is concerned about hair loss but currently not experiencing any.        PSYCH:  Depression-none  Anxiety-none    MUSC:  Knee feels like it might give way after she has been sitting and goes to stand and walk.  Feels like her patella is lax and could overextend backward. backward.         :   Is using Nuvaring  "for birth control.  Forgets to take out Nuvaring out. Has a period about every 3 months.  Not sexually active.      PHYSICAL EXAMINATION:   /64 (BP Location: Right arm, Patient Position: Sitting, Cuff Size: Adult Regular)  Pulse 86  Resp 12  Ht 5' 5\" (1.651 m)  Wt 168 lb 11.2 oz (76.5 kg)  SpO2 98%  BMI 28.07 kg/m2    GENERAL Pt in NAD.  HEART: No JVD  LUNGS: Breathing unlabored.  ABD: Soft, NT, incisions well healed  MUSC: Gait normal  NEURO: Alert and oriented x3.     ASSESSMENT AND PLAN:      1. Bariatric surgery status  9 months s/p laparoscopic gastric sleeve    2. Postsurgical malabsorption  Restart taking her MVI but take 1 in morning and 1 at night to see if this decreases nausea/vomiting  Ordered  - CBC with platelets; Future  - Iron and Iron Binding Capacity; Future  - Ferritin; Future  - Vitamin D Screen; Future  - Parathyroid Hormone Intact; Future      3. GERD without esophagitis  - omeprazole (PRILOSEC OTC) 20 MG EC tablet; Take 1 tablet (20 mg) by mouth daily prn Dispense: 30 tablet; Refill: 0  - ranitidine (ZANTAC) 150 MG tablet; Take 1 tablet (150 mg) by mouth 2 times daily as needed for heartburn  Dispense: 60 tablet; Refill: 0    4. Overweight Body mass index is 28.07 kg/(m^2).  Discussed current weight and expectation for weight loss with this surgery. Pt is out of obese category.  This is where I expected her to land at the 1 year miguelito.   I expect her weight to remain stable now, unless she tightens up her food plans and adds significant intensity about 4 days a week to her activity. Talked about the need to solidify those lifestyle changes that will keep her successful long term even though she can eat more than 1 cup of food/drink with her meals.     5.  Knee Instability  See PCP for knee evaluation.  Suspect she will need to do some PT to strengthen her muscles around her patella.       FOLLOW UP:  Reschedule diet appt  Return to clinic in 3 months for annual appt.        I spent " a total of 25 minutes face to face with Geneva during today's office visit. Over 50% of this time was spent counseling the patient and/or coordinating care.

## 2018-12-03 NOTE — MR AVS SNAPSHOT
After Visit Summary   12/3/2018    Geneva Cooley    MRN: [id number]           Patient Information     Date Of Birth          1985        Visit Information        Provider Department      12/3/2018 11:00 AM Esthela Simeon PA-C [facility]      Today's Diagnoses     Bariatric surgery status        Postsurgical malabsorption        GERD without esophagitis          Care Instructions    Login: [id number]          Follow-ups after your visit        Follow-up notes from your care team     Return in 3 months (on 3/3/2019).      Your next 10 appointments already scheduled     Feb 08, 2019 10:30 AM CST   (Arrive by 10:15 AM)   Return Bariatric Nutrition Visit with  Gerry Prado 2, RD   [facility] ([facility])    [address] [place] [id number] [phone]            Feb 08, 2019 11:00 AM CST   (Arrive by 10:45 AM)   Return Bariatric Visit with Esthela Simeon PA-C   [facility] ([facility])    [address] [place] [id number] [phone]              Future tests that were ordered for you today     Open Future Orders        Priority Expected Expires Ordered    Vitamin D Screen Routine 12/3/2018 6/1/2019 12/3/2018    Parathyroid Hormone Intact Routine 12/3/2018 6/1/2019 12/3/2018    CBC with platelets Routine 12/3/2018 6/1/2019 12/3/2018    Iron and Iron Binding Capacity Routine 12/3/2018 6/1/2019 12/3/2018    Ferritin Routine 12/3/2018 6/1/2019 12/3/2018            Who to contact     If you have questions or need follow up information about today's clinic visit or your schedule please contact [facility] directly at [phone].  Normal or non-critical lab and imaging results will be communicated to you by  "MyChart, letter or phone within 4 business days after the clinic has received the results. If you do not hear from us within 7 days, please contact the clinic through MissingLINKhart or phone. If you have a critical or abnormal lab result, we will notify you by phone as soon as possible.  Submit refill requests through [facility] or call your pharmacy and they will forward the refill request to us. Please allow 3 business days for your refill to be completed.          Additional Information About Your Visit        MissingLINKharNanomech Information     [facility] gives you secure access to your electronic health record. If you see a primary care provider, you can also send messages to your care team and make appointments. If you have questions, please call your primary care clinic.  If you do not have a primary care provider, please call [phone] and they will assist you.        Care EveryWhere ID     This is your Care EveryWhere ID. This could be used by other organizations to access your [facility] medical records  [id number]        Your Vitals Were     Pulse Respirations Height Pulse Oximetry BMI (Body Mass Index)       86 12 5' 5\" (1.651 m) 98% 28.07 kg/m2        Blood Pressure from Last 3 Encounters:   12/03/18 116/64   08/29/18 112/72   08/03/18 106/68    Weight from Last 3 Encounters:   12/03/18 168 lb 11.2 oz (76.5 kg)   08/29/18 178 lb (80.7 kg)   08/03/18 178 lb 12.8 oz (81.1 kg)                 Today's Medication Changes          These changes are accurate as of 12/3/18  5:11 PM.  If you have any questions, ask your nurse or doctor.               Start taking these medicines.        Dose/Directions    ranitidine 150 MG tablet   Commonly known as:  ZANTAC   Used for:  GERD without esophagitis, Postsurgical malabsorption, Bariatric surgery status   Started by:  Esthela Simeon PA-C        Dose:  150 mg   Take 1 tablet (150 mg) by mouth 2 times daily as needed for heartburn   Quantity:  60 tablet   Refills:  0         Stop " taking these medicines if you haven't already. Please contact your care team if you have questions.     methylPREDNISolone 4 MG tablet therapy pack   Commonly known as:  MEDROL DOSEPAK   Stopped by:  Esthela Simeon PA-C                Where to get your medicines      These medications were sent to [facility] [address] [place], [place] - [address] [facility]  [address] [facility] [address]     Phone:  [phone]     omeprazole 20 MG EC tablet    ranitidine 150 MG tablet                Primary Care Provider Office Phone # Fax #    Jody Coughlin, APRN [phone] [address] [place] [address]        Equal Access to Services     [facility]: Hadii aad yesenia hadasho Soomaali, waaxda luqadaha, qaybta kaalmada adeegyada, oscar cárdenas . So [facility] [phone].    ATENCIÓN: Si habla español, tiene a tyler disposición servicios gratuitos de asistencia lingüística. [facility] [phone].    We comply with applicable federal civil rights laws and [place] laws. We do not discriminate on the basis of race, color, national origin, age, disability, sex, sexual orientation, or gender identity.            Thank you!     Thank you for choosing [facility]  for your care. Our goal is always to provide you with excellent care. Hearing back from our patients is one way we can continue to improve our services. Please take a few minutes to complete the written survey that you may receive in the mail after your visit with us. Thank you!             Your Updated Medication List - Protect others around you: Learn how to safely use, store and throw away your medicines at www.disposemymeds.org.          This list is accurate as of 12/3/18  5:11 PM.  Always use your most recent med list.                   Brand Name Dispense Instructions for use Diagnosis    BIOTIN 5000 PO           etonogestrel-ethinyl estradiol 0.12-0.015  MG/24HR vaginal ring    NUVARING    1 each    Insert 1 ring vaginally every 21 days then remove for 1 week then repeat with new ring.    Encounter for surveillance of vaginal ring hormonal contraceptive device       omeprazole 20 MG EC tablet    priLOSEC OTC    30 tablet    Take 1 tablet (20 mg) by mouth daily    Bariatric surgery status, GERD without esophagitis       ranitidine 150 MG tablet    ZANTAC    60 tablet    Take 1 tablet (150 mg) by mouth 2 times daily as needed for heartburn    GERD without esophagitis, Postsurgical malabsorption, Bariatric surgery status       VIACTIV PO      Take 1 chew tab by mouth 3 times daily (with meals)        VITAMIN B12 PO      Take 5,000 mcg by mouth once a week        VITAMIN D (CHOLECALCIFEROL) PO      Take 5,000 Units by mouth daily        WOMENS MULTIVITAMIN PO

## 2019-03-09 DIAGNOSIS — Z30.44 ENCOUNTER FOR SURVEILLANCE OF VAGINAL RING HORMONAL CONTRACEPTIVE DEVICE: ICD-10-CM

## 2019-03-11 RX ORDER — ETONOGESTREL AND ETHINYL ESTRADIOL VAGINAL RING .015; .12 MG/D; MG/D
RING VAGINAL
Qty: 1 EACH | Refills: 5 | Status: SHIPPED | OUTPATIENT
Start: 2019-03-11 | End: 2019-04-19

## 2019-03-11 NOTE — TELEPHONE ENCOUNTER
[place]    Prescription approved per [facility] Refill Protocol.    Deandra Oneil, RN, BSN

## 2019-03-21 ENCOUNTER — TELEPHONE (OUTPATIENT)
Dept: FAMILY MEDICINE | Facility: OTHER | Age: 34
End: 2019-03-21

## 2019-03-21 NOTE — TELEPHONE ENCOUNTER
Spoke with patient.  Thinks that she is getting dehydrated. Dizzy when stands up.   A little weaker then normal with strength.  Doing ok otherwise.  HX of bariatric surgery    Water is harder to get down. Has been drinking Gatorade zero.     Will try Pedialyte also have her BP checked   If not improving or symptoms worsen will go to ED     RECOMMENDED DISPOSITION:  Home care advice -   Will comply with recommendation: yes   If further questions/concerns or if Sx do not improve, worsen or new Sx develop, call your PCP or [facility] as soon as possible.    NOTES:  Disposition was determined by the first positive assessment question, therefore all previous assessment questions were negative.     Guideline used:  Telephone Triage Protocols for Nurses, Fifth Edition, Kristin Wesley RN, BSN

## 2019-04-16 ENCOUNTER — MYC MEDICAL ADVICE (OUTPATIENT)
Dept: FAMILY MEDICINE | Facility: OTHER | Age: 34
End: 2019-04-16

## 2019-04-16 NOTE — PROGRESS NOTES
SUBJECTIVE:   Geneva Cooley is a 33 year old female who presents to clinic today for the following health issues:      HPI       Concern - Leg problem  Onset: Couple weeks    Description:   Patient has honeycomb pattern on left calf.  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  When she gets done with the best route that area of her calf will be extremely warm and bright red from all of the heat.  She has not noticed any swelling or pain in that calf.  She is concerned because her coworkers keep pointing out the strange pattern on her calf.  She recently had bariatric surgery and has lost 50 pounds.    Intensity: Noticeable to other people    Progression of Symptoms:  same    Accompanying Signs & Symptoms:  None    Previous history of similar problem:   No    Precipitating factors:   Worsened by:     Alleviating factors:  Improved by:     Therapies Tried and outcome: None    She would also like to discuss that she has had her period for the past 3 weeks.  She was previously on NuvaRing after her bariatric surgery and then stopped using NuvaRing several months ago as she would forget to take it out and forget to put it back in and felt it was more bothersome than useful.  She was having monthly periods after stopping the NuvaRing up until recently when she started bleeding moderately for about a week and heavily for the past couple weeks.    She has been taking omeprazole daily and needs refills. Symptoms stable. No side effects of medication.     Answers for HPI/ROS submitted by the patient on 4/19/2019   Chronic problems general questions HPI Form  PHQ9 TOTAL SCORE: 3  LILIA 7 TOTAL SCORE: 2      Additional history: as documented    Reviewed and updated as needed this visit by clinical staff         Reviewed and updated as needed this visit by Provider         Patient Active Problem List   Diagnosis     Insomnia     Knee strain     Anxiety     Major depressive  disorder, single episode, moderate (H)     Chronic bilateral low back pain without sciatica     Family history of breast cancer in mother     Nieves splints     Family history of rheumatoid arthritis     Bariatric surgery status     Obesity, Class I, BMI 30-34.9     Postsurgical malabsorption     Past Surgical History:   Procedure Laterality Date     COMBINED LAPAROSCOPIC GASTRIC SLEEVE, CHOLECYSTECTOMY N/A 2/5/2018    Procedure: COMBINED LAPAROSCOPIC GASTRIC SLEEVE, CHOLECYSTECTOMY;   LAPAROSCOPIC GASTRIC SLEEVE ;  Surgeon: Yoel Lowry MD;  Location: [facility] OR     NO HISTORY OF SURGERY         Social History     Tobacco Use     Smoking status: Never Smoker     Smokeless tobacco: Never Used     Tobacco comment: smoked at age 17    Substance Use Topics     Alcohol use: No     Alcohol/week: 0.0 oz     Comment: rarely     Family History   Problem Relation Age of Onset     Hypertension Mother      Diabetes Mother      Cerebrovascular Disease Mother 46        aneurysm     Lipids Mother      Depression Mother      Breast Cancer Mother 60     Hypertension Father      Arthritis Father      Heart Disease Father         MI in late 40's      Lipids Father      Depression Sister      Hypertension Sister      Hypertension Brother          Current Outpatient Medications   Medication Sig Dispense Refill     etonogestrel-ethinyl estradiol (NUVARING) 0.12-0.015 MG/24HR vaginal ring INSERT ONE RING VAGINALLY FOR 3 WEEKS.REMOVE FOR ONE WEEK. THEN REPEAT WITH NEW RING 1 each 11     Multiple Vitamins-Minerals (WOMENS MULTIVITAMIN PO)        omeprazole (PRILOSEC) 20 MG DR capsule TAKE ONE CAPSULE BY MOUTH ONCE DAILY 90 capsule 1     ranitidine (ZANTAC) 150 MG tablet Take 1 tablet (150 mg) by mouth 2 times daily as needed for heartburn 60 tablet 0     tranexamic acid (LYSTEDA) 650 MG tablet Take 2 tablets (1,300 mg) by mouth 3 times daily for 5 days 30 tablet 0     VITAMIN D, CHOLECALCIFEROL, PO Take 5,000 Units by mouth daily    "    Allergies   Allergen Reactions     Vicodin [Hydrocodone-Acetaminophen] Hives     Recent Labs   Lab Test 02/14/18  1300 02/06/18  0850 02/05/18  1700 01/26/18  1029 07/03/17  1305 01/16/17  1221 03/21/14  1200 04/17/12  1155   A1C  --   --   --   --  5.3 5.5  --   --    LDL  --   --   --   --   --   --   --  110   HDL  --   --   --   --   --   --   --  39*   TRIG  --   --   --   --   --   --   --  191*   ALT  --   --   --   --  24  --   --   --    CR  --   --  0.49* 0.58 0.63  --  0.61 0.60   GFRESTIMATED  --   --  >90 >90 >90  Non African American GFR Calc    --  >90 >90   GFRESTBLACK  --   --  >90 >90 >90  African American GFR Calc    --  >90 >90   POTASSIUM 3.6 3.9  --  4.3 3.9  --  3.9 4.3   TSH  --   --   --   --   --  2.28 2.62 3.36      BP Readings from Last 3 Encounters:   04/19/19 104/60   12/03/18 116/64   08/29/18 112/72    Wt Readings from Last 3 Encounters:   04/19/19 77 kg (169 lb 12.8 oz)   12/03/18 76.5 kg (168 lb 11.2 oz)   08/29/18 80.7 kg (178 lb)                  Labs reviewed in EPIC    ROS:  Constitutional, HEENT, cardiovascular, pulmonary, GI, , musculoskeletal, neuro, skin, endocrine and psych systems are negative, except as otherwise noted.    OBJECTIVE:     /60   Pulse 72   Temp 98.2  F (36.8  C) (Temporal)   Resp 16   Ht 1.632 m (5' 4.25\")   Wt 77 kg (169 lb 12.8 oz)   BMI 28.92 kg/m    Body mass index is 28.92 kg/m .  GENERAL: healthy, alert and no distress  RESP: lungs clear to auscultation - no rales, rhonchi or wheezes  CV: regular rate and rhythm, normal S1 S2, no S3 or S4, no murmur, click or rub, no peripheral edema and peripheral pulses strong  MS: no gross musculoskeletal defects noted, no edema  SKIN: Honeycomb appearance of skin on left posterior lateral calf, without raised areas, palpation reveals underlying venous structures under the darkened areas and adipose tissue in between the venous structures  NEURO: Normal strength and tone, mentation intact and " speech normal  PSYCH: mentation appears normal, affect normal/bright    Diagnostic Test Results:  No results found for this or any previous visit (from the past 24 hour(s)).    ASSESSMENT/PLAN:     1. Asymptomatic varicose veins of left lower extremity  Discussed with the patient that the exam reveals probable varicose veins encasing adipose tissue which is accounting for the honeycomb appearance of the skin on her left calf.  I believe that the frequent exposure to the heater on her bus directly to that area has dilated those veins causing them to enlarge admits the adipose tissue and creating this effect.  She has no pain or swelling in this calf. Calf circumference is equal bilaterally.  I recommended that she monitor and if any changes return to the clinic.    2. Encounter for surveillance of vaginal ring hormonal contraceptive device  - etonogestrel-ethinyl estradiol (NUVARING) 0.12-0.015 MG/24HR vaginal ring; INSERT ONE RING VAGINALLY FOR 3 WEEKS.REMOVE FOR ONE WEEK. THEN REPEAT WITH NEW RING  Dispense: 1 each; Refill: 11    3. Menorrhagia with irregular cycle  Will treat with tranexamic acid for up to 5 days. Follow up with gynecology if symptoms persist or worsen.  - tranexamic acid (LYSTEDA) 650 MG tablet; Take 2 tablets (1,300 mg) by mouth 3 times daily for 5 days  Dispense: 30 tablet; Refill: 0    4. Bariatric surgery status  - omeprazole (PRILOSEC) 20 MG DR capsule; TAKE ONE CAPSULE BY MOUTH ONCE DAILY  Dispense: 90 capsule; Refill: 1    5. GERD without esophagitis  Stable. Continue current medication(s) and dose(s).    - omeprazole (PRILOSEC) 20 MG DR capsule; TAKE ONE CAPSULE BY MOUTH ONCE DAILY  Dispense: 90 capsule; Refill: 1    6. Major depressive disorder, single episode, moderate (H)  Stable. No current medication.   PHQ-9 SCORE 1/26/2018 5/3/2018 4/19/2019   PHQ-9 Total Score - - -   PHQ-9 Total Score MyChart 2 (Minimal depression) 0 3 (Minimal depression)   PHQ-9 Total Score 2 0 3        CLAIRE [facility]berg [facility]  Answers for HPI/ROS submitted by the patient on 4/19/2019   Chronic problems general questions HPI Form  PHQ9 TOTAL SCORE: 3  LILIA 7 TOTAL SCORE: 2

## 2019-04-19 ENCOUNTER — OFFICE VISIT (OUTPATIENT)
Dept: FAMILY MEDICINE | Facility: OTHER | Age: 34
End: 2019-04-19
Payer: COMMERCIAL

## 2019-04-19 VITALS
HEART RATE: 72 BPM | RESPIRATION RATE: 16 BRPM | DIASTOLIC BLOOD PRESSURE: 60 MMHG | WEIGHT: 169.8 LBS | HEIGHT: 64 IN | TEMPERATURE: 98.2 F | SYSTOLIC BLOOD PRESSURE: 104 MMHG | BODY MASS INDEX: 28.99 KG/M2

## 2019-04-19 DIAGNOSIS — Z98.84 BARIATRIC SURGERY STATUS: ICD-10-CM

## 2019-04-19 DIAGNOSIS — Z30.44 ENCOUNTER FOR SURVEILLANCE OF VAGINAL RING HORMONAL CONTRACEPTIVE DEVICE: ICD-10-CM

## 2019-04-19 DIAGNOSIS — I83.92 ASYMPTOMATIC VARICOSE VEINS OF LEFT LOWER EXTREMITY: Primary | ICD-10-CM

## 2019-04-19 DIAGNOSIS — K21.9 GERD WITHOUT ESOPHAGITIS: ICD-10-CM

## 2019-04-19 DIAGNOSIS — K91.2 POSTSURGICAL MALABSORPTION: ICD-10-CM

## 2019-04-19 DIAGNOSIS — F32.1 MAJOR DEPRESSIVE DISORDER, SINGLE EPISODE, MODERATE (H): ICD-10-CM

## 2019-04-19 DIAGNOSIS — N92.1 MENORRHAGIA WITH IRREGULAR CYCLE: ICD-10-CM

## 2019-04-19 LAB
ERYTHROCYTE [DISTWIDTH] IN BLOOD BY AUTOMATED COUNT: 14.5 % (ref 10–15)
FERRITIN SERPL-MCNC: 22 NG/ML (ref 12–150)
HCT VFR BLD AUTO: 37.7 % (ref 35–47)
HGB BLD-MCNC: 12.6 G/DL (ref 11.7–15.7)
IRON SATN MFR SERPL: 19 % (ref 15–46)
IRON SERPL-MCNC: 82 UG/DL (ref 35–180)
MCH RBC QN AUTO: 28.9 PG (ref 26.5–33)
MCHC RBC AUTO-ENTMCNC: 33.4 G/DL (ref 31.5–36.5)
MCV RBC AUTO: 87 FL (ref 78–100)
PLATELET # BLD AUTO: 256 10E9/L (ref 150–450)
PTH-INTACT SERPL-MCNC: 106 PG/ML (ref 18–80)
RBC # BLD AUTO: 4.36 10E12/L (ref 3.8–5.2)
TIBC SERPL-MCNC: 439 UG/DL (ref 240–430)
WBC # BLD AUTO: 5 10E9/L (ref 4–11)

## 2019-04-19 PROCEDURE — 36415 COLL VENOUS BLD VENIPUNCTURE: CPT | Performed by: PHYSICIAN ASSISTANT

## 2019-04-19 PROCEDURE — 83550 IRON BINDING TEST: CPT | Performed by: PHYSICIAN ASSISTANT

## 2019-04-19 PROCEDURE — 82728 ASSAY OF FERRITIN: CPT | Performed by: PHYSICIAN ASSISTANT

## 2019-04-19 PROCEDURE — 83970 ASSAY OF PARATHORMONE: CPT | Performed by: PHYSICIAN ASSISTANT

## 2019-04-19 PROCEDURE — 82306 VITAMIN D 25 HYDROXY: CPT | Performed by: PHYSICIAN ASSISTANT

## 2019-04-19 PROCEDURE — 85027 COMPLETE CBC AUTOMATED: CPT | Performed by: PHYSICIAN ASSISTANT

## 2019-04-19 PROCEDURE — 83540 ASSAY OF IRON: CPT | Performed by: PHYSICIAN ASSISTANT

## 2019-04-19 PROCEDURE — 99214 OFFICE O/P EST MOD 30 MIN: CPT | Performed by: STUDENT IN AN ORGANIZED HEALTH CARE EDUCATION/TRAINING PROGRAM

## 2019-04-19 RX ORDER — TRANEXAMIC ACID 650 MG/1
1300 TABLET ORAL 3 TIMES DAILY
Qty: 30 TABLET | Refills: 0 | Status: SHIPPED | OUTPATIENT
Start: 2019-04-19 | End: 2019-04-24

## 2019-04-19 RX ORDER — ETONOGESTREL AND ETHINYL ESTRADIOL VAGINAL RING .015; .12 MG/D; MG/D
RING VAGINAL
Qty: 1 EACH | Refills: 11 | Status: SHIPPED | OUTPATIENT
Start: 2019-04-19 | End: 2020-01-16

## 2019-04-19 ASSESSMENT — ANXIETY QUESTIONNAIRES
GAD7 TOTAL SCORE: 2
7. FEELING AFRAID AS IF SOMETHING AWFUL MIGHT HAPPEN: NOT AT ALL
6. BECOMING EASILY ANNOYED OR IRRITABLE: SEVERAL DAYS
1. FEELING NERVOUS, ANXIOUS, OR ON EDGE: NOT AT ALL
7. FEELING AFRAID AS IF SOMETHING AWFUL MIGHT HAPPEN: NOT AT ALL
3. WORRYING TOO MUCH ABOUT DIFFERENT THINGS: NOT AT ALL
2. NOT BEING ABLE TO STOP OR CONTROL WORRYING: NOT AT ALL
GAD7 TOTAL SCORE: 2
4. TROUBLE RELAXING: SEVERAL DAYS
5. BEING SO RESTLESS THAT IT IS HARD TO SIT STILL: NOT AT ALL
GAD7 TOTAL SCORE: 2

## 2019-04-19 ASSESSMENT — PATIENT HEALTH QUESTIONNAIRE - PHQ9
SUM OF ALL RESPONSES TO PHQ QUESTIONS 1-9: 3
SUM OF ALL RESPONSES TO PHQ QUESTIONS 1-9: 3

## 2019-04-19 ASSESSMENT — MIFFLIN-ST. JEOR: SCORE: 1469.21

## 2019-04-20 ASSESSMENT — PATIENT HEALTH QUESTIONNAIRE - PHQ9: SUM OF ALL RESPONSES TO PHQ QUESTIONS 1-9: 3

## 2019-04-20 ASSESSMENT — ANXIETY QUESTIONNAIRES: GAD7 TOTAL SCORE: 2

## 2019-04-22 LAB — DEPRECATED CALCIDIOL+CALCIFEROL SERPL-MC: 30 UG/L (ref 20–75)

## 2019-04-26 DIAGNOSIS — Z98.84 BARIATRIC SURGERY STATUS: Primary | ICD-10-CM

## 2019-04-26 DIAGNOSIS — E21.1 HYPERPARATHYROIDISM DUE TO VITAMIN D DEFICIENCY (H): ICD-10-CM

## 2019-12-03 ENCOUNTER — OFFICE VISIT (OUTPATIENT)
Dept: OBGYN | Facility: OTHER | Age: 34
End: 2019-12-03
Payer: COMMERCIAL

## 2019-12-03 VITALS
WEIGHT: 165.4 LBS | SYSTOLIC BLOOD PRESSURE: 112 MMHG | BODY MASS INDEX: 28.17 KG/M2 | DIASTOLIC BLOOD PRESSURE: 62 MMHG | HEART RATE: 70 BPM

## 2019-12-03 DIAGNOSIS — Z87.42 PERSONAL HISTORY OF OVARIAN CYST: ICD-10-CM

## 2019-12-03 DIAGNOSIS — N93.9 ABNORMAL UTERINE BLEEDING (AUB): Primary | ICD-10-CM

## 2019-12-03 PROCEDURE — 99203 OFFICE O/P NEW LOW 30 MIN: CPT | Performed by: OBSTETRICS & GYNECOLOGY

## 2019-12-03 NOTE — PROGRESS NOTES
SUBJECTIVE:       HPI: Geneva Cooley is a 34 year old  who presents today for irregular menstrual cycles irregular menses. Ms. Cooley states that she has a long-standing history of irregular menstrual cycles, that was controlled with OCPs when she was younger and now controlled with a NuvaRing. She tends to forget placing the NuvaRing or removing it when she is supposed to. She was initially scheduled to see me while she was in the middle of persistent bleeding >1 week. Since that time, however, she has started using a new NuvaRing and has not had further bleeding. She does not usually get breakthrough bleeding with the NuvaRing. She is opposed to any of the other forms on contraceptives, including OCPs (will forget to take) or Mirena IUD (previous poor experience). She would like to continue with the NuvaRing and is considering pregnancy in the future. She however had a sleeve gastrectomy in 2018 and would like to wait longer before trying to get pregnant.    She denies any history of excessive bleeding with dental or surgical procedures. She has no known history of bleeding disorders or thyroid dysfunction. She has a history of known ovarian cysts and excessive hair growth on chin. She has lost over 50 pounds since her sleeve gastrectomy. She is not sexually active and is adamant that she could not have any STDs or be pregnant.    She denies vaginal discharge, dysmenorrhea, dyspareunia. She denies fevers/chills, nausea/vomiting, abdominal pain or bloating. Denies dysuria, hematuria, constipation or diarrhea.      Ob Hx:  s/p .      Gyn Hx: No LMP recorded (lmp unknown). (Menstrual status: Birth Control).    Patient is not sexually active.    Last pap was 2017 NIL, Neg HR HPV   STI history Remote history chlamydia  Using NuvaRing for contraception.   STD testing offered?  Declined - not sexually active  Menarche unknown years old.   Family history of gyn-related malignancies: Mother  of  breast cancer age 66yo (diagnosed in 50s). No genetic testing.          reports that she has never smoked. She has never used smokeless tobacco.      Today's PHQ-2 Score:   PHQ-2 ( 1999 [employer]) 4/19/2019   Q1: Little interest or pleasure in doing things 1   Q2: Feeling down, depressed or hopeless 0   PHQ-2 Score 1   Q1: Little interest or pleasure in doing things Several days   Q2: Feeling down, depressed or hopeless Not at all   PHQ-2 Score 1     Today's PHQ-9 Score:   PHQ-9 SCORE 4/19/2019   PHQ-9 Total Score -   PHQ-9 Total Score MyChart 3 (Minimal depression)   PHQ-9 Total Score 3     Today's LILIA-7 Score:   LILIA-7 SCORE 4/19/2019   Total Score -   Total Score 2 (minimal anxiety)   Total Score 2       Problem list and histories reviewed & adjusted, as indicated.  Additional history: as documented.    Patient Active Problem List   Diagnosis     Insomnia     Knee strain     Anxiety     Major depressive disorder, single episode, moderate (H)     Chronic bilateral low back pain without sciatica     Family history of breast cancer in mother     Nieves splints     Family history of rheumatoid arthritis     Bariatric surgery status     Obesity, Class I, BMI 30-34.9     Postsurgical malabsorption     Past Surgical History:   Procedure Laterality Date     COMBINED LAPAROSCOPIC GASTRIC SLEEVE, CHOLECYSTECTOMY N/A 2/5/2018    Procedure: COMBINED LAPAROSCOPIC GASTRIC SLEEVE, CHOLECYSTECTOMY;   LAPAROSCOPIC GASTRIC SLEEVE ;  Surgeon: Yoel Lowry MD;  Location: [facility] OR     NO HISTORY OF SURGERY        Social History     Tobacco Use     Smoking status: Never Smoker     Smokeless tobacco: Never Used     Tobacco comment: smoked at age 17    Substance Use Topics     Alcohol use: No     Alcohol/week: 0.0 standard drinks     Comment: rarely      Problem (# of Occurrences) Relation (Name,Age of Onset)    Arthritis (1) Father    Breast Cancer (1) Mother (60)    Cerebrovascular Disease (1) Mother (46): aneurysm    Depression (2)  Mother, Sister    Diabetes (1) Mother    Heart Disease (1) Father: MI in late 40's     Hypertension (4) Mother, Father, Sister, Brother    Lipids (2) Mother, Father            etonogestrel-ethinyl estradiol (NUVARING) 0.12-0.015 MG/24HR vaginal ring, INSERT ONE RING VAGINALLY FOR 3 WEEKS.REMOVE FOR ONE WEEK. THEN REPEAT WITH NEW RING  Multiple Vitamins-Minerals (WOMENS MULTIVITAMIN PO),   omeprazole (PRILOSEC) 20 MG DR capsule, TAKE ONE CAPSULE BY MOUTH ONCE DAILY  VITAMIN D, CHOLECALCIFEROL, PO, Take 5,000 Units by mouth daily  ranitidine (ZANTAC) 150 MG tablet, Take 1 tablet (150 mg) by mouth 2 times daily as needed for heartburn (Patient not taking: Reported on 12/3/2019)    No current facility-administered medications on file prior to visit.     Allergies   Allergen Reactions     Vicodin [Hydrocodone-Acetaminophen] Hives       ROS:  10 Point review of systems negative other noted above in HPI    OBJECTIVE:     /62 (BP Location: Right arm, Cuff Size: Adult Regular)   Pulse 70   Wt 75 kg (165 lb 6.4 oz)   LMP  (LMP Unknown)   BMI 28.17 kg/m    Body mass index is 28.17 kg/m .    Gen: Alert, oriented, appropriately interactive, NAD  Chest: Symmetrical, unlabored breathing  Abdomen: soft, non tender, non distended, no masses, no hernias. No inguinal lymphadenopathy.   External genitalia: no lesions; normal appearing external genitalia, bartholins glands, urethra, skenes glands  Vagina: no masses or lesions or discharge, normally rugated.  Cervix: no masses or lesions or discharge   Bimanual exam:   Nontender pelvic floor muscles  Urethra: nontender   Bladder: nontender and without massess, well supported   Uterus: midline, anteverted, small, mobile  no masses, non-tender  Adnexa: no masses or tenderness appreciated. Slight left adnexal fullness.   No cervical motion tenderness  MSK: normal gait, symmetric movements UE & LE  Lower extremities: non-tender, no edema    In-Clinic Test Results:  No results found  for this or any previous visit (from the past 24 hour(s)).    ASSESSMENT/PLAN:                                                      Geneva Cooley is a 34 year old  who presents today for irregular menstrual cycles irregular menses      ICD-10-CM    1. Abnormal uterine bleeding (AUB) N93.9 US Pelvic Complete with Transvaginal     TSH with free T4 reflex     No longer symptomatic, bleeding controlled with NuvaRing, now that she is using it again. Has a history of incorrect use/forgetting to change/place. Counseled on correct use of NuvaRing and offered other options for management, including Mirena IUD. Patient declines other options and will try harder to remember her NuvaRing. I suggested setting a timer on her phone for each month as a reminder. She is unable to take NSAIDs secondary to history gastric sleeve. Declined STD and pregnancy testing. No recent TSH, therefore, ordered today.    Up to date on cervical cancer screening, no concerning findings on exam today.     Pelvic ultrasound ordered with history ovarian cyst limited follow-up.    Will follow-up results and manage as indicated. Otherwise, if negative, follow-up if desiring pregnancy or further concerns.       Monika Tineo, DO  [facility]

## 2019-12-08 ENCOUNTER — HEALTH MAINTENANCE LETTER (OUTPATIENT)
Age: 34
End: 2019-12-08

## 2020-01-06 ENCOUNTER — ANCILLARY PROCEDURE (OUTPATIENT)
Dept: ULTRASOUND IMAGING | Facility: OTHER | Age: 35
End: 2020-01-06
Attending: OBSTETRICS & GYNECOLOGY
Payer: COMMERCIAL

## 2020-01-06 DIAGNOSIS — N93.9 ABNORMAL UTERINE BLEEDING (AUB): ICD-10-CM

## 2020-01-06 PROCEDURE — 76856 US EXAM PELVIC COMPLETE: CPT

## 2020-01-06 PROCEDURE — 76830 TRANSVAGINAL US NON-OB: CPT

## 2020-01-16 ENCOUNTER — OFFICE VISIT (OUTPATIENT)
Dept: OBGYN | Facility: OTHER | Age: 35
End: 2020-01-16
Payer: COMMERCIAL

## 2020-01-16 VITALS
WEIGHT: 163.25 LBS | HEART RATE: 74 BPM | SYSTOLIC BLOOD PRESSURE: 102 MMHG | BODY MASS INDEX: 27.8 KG/M2 | DIASTOLIC BLOOD PRESSURE: 62 MMHG

## 2020-01-16 DIAGNOSIS — N93.9 ABNORMAL UTERINE BLEEDING (AUB): Primary | ICD-10-CM

## 2020-01-16 DIAGNOSIS — Z11.3 SCREENING FOR VENEREAL DISEASE: ICD-10-CM

## 2020-01-16 LAB
SPECIMEN SOURCE: NORMAL
WET PREP SPEC: NORMAL

## 2020-01-16 PROCEDURE — 87491 CHLMYD TRACH DNA AMP PROBE: CPT | Performed by: OBSTETRICS & GYNECOLOGY

## 2020-01-16 PROCEDURE — 87591 N.GONORRHOEAE DNA AMP PROB: CPT | Performed by: OBSTETRICS & GYNECOLOGY

## 2020-01-16 PROCEDURE — 99214 OFFICE O/P EST MOD 30 MIN: CPT | Performed by: OBSTETRICS & GYNECOLOGY

## 2020-01-16 PROCEDURE — 87210 SMEAR WET MOUNT SALINE/INK: CPT | Performed by: OBSTETRICS & GYNECOLOGY

## 2020-01-16 RX ORDER — DESOGESTREL AND ETHINYL ESTRADIOL 0.15-0.03
1 KIT ORAL DAILY
Qty: 84 TABLET | Refills: 4 | Status: SHIPPED | OUTPATIENT
Start: 2020-01-16 | End: 2020-02-04

## 2020-01-16 NOTE — PROGRESS NOTES
"OB/GYN       NAME:  Geneva Cooley  PCP:  Jody Coughlin  MRN:  [id number]    Impression / Plan     34 year old  with:      ICD-10-CM    1. Abnormal uterine bleeding (AUB) N93.9 desogestrel-ethinyl estradiol (APRI) 0.15-30 MG-MCG tablet   2. Screening for venereal disease Z11.3 Chlamydia trachomatis PCR     Neisseria gonorrhoeae PCR     Wet prep       Discussed normal US results.  Discussed STI testing.  She only would like the chlamydia, gonorrhea, and trichomonas testing today. She declines blood work.   Discussed abnormal uterine bleeding. She is not able to keep track of when she replaces the Nuvaring and that may be contributing to the irregular bleeding, but patient does not think that is the case. She would like to try a pill.  Instructions and precautions given.       Chief Complaint     Chief Complaint   Patient presents with     Gyn Exam       HPI     Geneva Cooley is a  34 year old female who is seen for STD check.  After further discussion, she actually wants the STD check to see if that is what is contributing to her bleeding.  Patient saw Dr. Tineo 12/3/2019 for irregular menstrual cycles.  Her note was reviewed.    Patient's last menstrual period was 01/10/2020 (approximate).     Patient restarted the Nuvaring last visit.  She states she generally knows when to replace it when she starts to spot.  She tries to take the Nuvaring in an extended fashion, having a periods every few months.  She states that she does not keep track of when she changes it.  She will replace it when she starts to spot and then the spotting will stop shortly after the new ring is placed.  She was concerned on  when she had dark \"old\" looking blood and the bleeding did not stop with the new ring.    She was told she might have breakthrough bleeding on the Nuvaring if she does not replace it as scheduled, but she does not think this is why she is bleeding.  She feels she is taking it regularly, " but does not keep track.    She is interested in the pill. She states she tolerated it well in the past.  She has a gastric sleeve.    She states she thinks she has PCOS, but has not had the laboratory testing.  Course facial hair.   TSH was ordered by Dr. Tineo, but not done.   No known STI exposure.     Date of Last Pap Smear:   Lab Results   Component Value Date    PAP NIL 05/08/2017       Problem List     Patient Active Problem List    Diagnosis Date Noted     Postsurgical malabsorption 05/14/2018     Priority: Medium     Obesity, Class I, BMI 30-34.9 02/14/2018     Priority: Medium     Bariatric surgery status 02/13/2018     Priority: Medium     Chronic bilateral low back pain without sciatica 01/29/2017     Priority: Medium     Family history of breast cancer in mother 01/29/2017     Priority: Medium     Shin splints 01/29/2017     Priority: Medium     Family history of rheumatoid arthritis 01/29/2017     Priority: Medium     Anxiety 04/06/2015     Priority: Medium     Major depressive disorder, single episode, moderate (H) 04/06/2015     Priority: Medium     Insomnia 04/17/2012     Priority: Medium     Knee strain 04/17/2012     Priority: Medium       Medications     Current Outpatient Medications   Medication     desogestrel-ethinyl estradiol (APRI) 0.15-30 MG-MCG tablet     omeprazole (PRILOSEC) 20 MG DR capsule     Prenatal Vit-Fe Fumarate-FA (PRENATAL PO)     VITAMIN D, CHOLECALCIFEROL, PO     No current facility-administered medications for this visit.         Allergies     Allergies   Allergen Reactions     Vicodin [Hydrocodone-Acetaminophen] Hives       Past Medical/Surgical History     Past Medical History:   Diagnosis Date     Depression      Shingles outbreak 2012       Past Surgical History:   Procedure Laterality Date     COMBINED LAPAROSCOPIC GASTRIC SLEEVE, CHOLECYSTECTOMY N/A 2/5/2018    Procedure: COMBINED LAPAROSCOPIC GASTRIC SLEEVE, CHOLECYSTECTOMY;   LAPAROSCOPIC GASTRIC SLEEVE ;   Surgeon: Yoel Lowry MD;  Location:  OR        Social History     Social History     Socioeconomic History     Marital status: Single     Spouse name: Not on file     Number of children: Not on file     Years of education: Not on file     Highest education level: Not on file   Occupational History     Employer: [employer]     Comment: school bus diver   Social Needs     Financial resource strain: Not on file     Food insecurity:     Worry: Not on file     Inability: Not on file     Transportation needs:     Medical: Not on file     Non-medical: Not on file   Tobacco Use     Smoking status: Never Smoker     Smokeless tobacco: Never Used     Tobacco comment: smoked at age 17    Substance and Sexual Activity     Alcohol use: No     Alcohol/week: 0.0 standard drinks     Comment: rarely     Drug use: No     Sexual activity: Not Currently     Partners: Male     Birth control/protection: Condom, Inserts/Ring   Lifestyle     Physical activity:     Days per week: Not on file     Minutes per session: Not on file     Stress: Not on file   Relationships     Social connections:     Talks on phone: Not on file     Gets together: Not on file     Attends Anabaptism service: Not on file     Active member of club or organization: Not on file     Attends meetings of clubs or organizations: Not on file     Relationship status: Not on file     Intimate partner violence:     Fear of current or ex partner: Not on file     Emotionally abused: Not on file     Physically abused: Not on file     Forced sexual activity: Not on file   Other Topics Concern     Parent/sibling w/ CABG, MI or angioplasty before 65F 55M? Yes     Comment: Dad- Heartattack, Mom- Stroke   Social History Narrative     Not on file       Family History      Family History   Problem Relation Age of Onset     Hypertension Mother      Diabetes Mother      Cerebrovascular Disease Mother 46        aneurysm     Lipids Mother      Depression Mother      Breast  Cancer Mother 60     Hypertension Father      Arthritis Father      Heart Disease Father         MI in late 40's      Lipids Father      Depression Sister      Hypertension Sister      Hypertension Brother        ROS     A 10 organ review of systems was asked and the pertinent positives and negatives are listed in the HPI. All other organ systems can be considered negative.     Physical Exam   Vitals: /62 (BP Location: Right arm, Cuff Size: Adult Regular)   Pulse 74   Wt 74 kg (163 lb 4 oz)   LMP 01/10/2020 (Approximate)   BMI 27.80 kg/m      General: Comfortable, no obvious distress  HEENT: Sclera clear.  Trachea midline.   Skin: No rashes, lesions, or subcutaneous nodules. Normal skin temperature.   Psych: Alert. Appropriate affect,.  Normal speech.   : Normal female external genitalia.  No lesions.  Urethral meatus normal.  Speculum exam reveals a normal vaginal vault, normal cervix .  No lesions.  Small amount of menstrual blood, otherwise  No abnormal discharge.  Tampon recently removed.  Bimanual exam reveals a normal, mobile, nontender uterus.  No cervical motion tenderness.  Adnexa nontender with no palpable masses.    Extremities: No peripheral edema, nontender.  Gait steady       Labs/Imaging       Labs were reviewed in Epic   TSH   Date Value Ref Range Status   01/16/2017 2.28 0.40 - 4.00 mU/L Final        Imaging was reviewed in [facility].   US 1/6/2020:    Uterus 7.9 x 4.9 x 6.4 cm    Endometrial stripe 1.3 cm.  No abnormality seen    Ovaries normal    Left paraovarian simple cyst 3.4 cm.  No free fluid    30 minutes was spent with patient, more than 50% counseling and coordinating care, as noted above in the A/P    Nursing notes read and reviewed    Jazmin Mcclelland MD

## 2020-01-16 NOTE — NURSING NOTE
"Chief Complaint   Patient presents with     Gyn Exam       Initial /62 (BP Location: Right arm, Cuff Size: Adult Regular)   Pulse 74   Wt 74 kg (163 lb 4 oz)   LMP 01/10/2020 (Approximate)   BMI 27.80 kg/m   Estimated body mass index is 27.8 kg/m  as calculated from the following:    Height as of 19: 1.632 m (5' 4.25\").    Weight as of this encounter: 74 kg (163 lb 4 oz).  BP completed using cuff size: regular        The following HM Due: NONE      The following patient reported/Care Every where data was sent to:  P ABSTRACT QUALITY INITIATIVES [[id number]]       Jazmin Montenegro MA on 2020 at 12:59 PM           "

## 2020-01-17 LAB
C TRACH DNA SPEC QL NAA+PROBE: NEGATIVE
N GONORRHOEA DNA SPEC QL NAA+PROBE: NEGATIVE
SPECIMEN SOURCE: NORMAL
SPECIMEN SOURCE: NORMAL

## 2020-02-03 ENCOUNTER — OFFICE VISIT (OUTPATIENT)
Dept: FAMILY MEDICINE | Facility: OTHER | Age: 35
End: 2020-02-03
Payer: COMMERCIAL

## 2020-02-03 VITALS
OXYGEN SATURATION: 98 % | TEMPERATURE: 98.5 F | HEART RATE: 70 BPM | RESPIRATION RATE: 16 BRPM | SYSTOLIC BLOOD PRESSURE: 100 MMHG | BODY MASS INDEX: 27.39 KG/M2 | WEIGHT: 164.4 LBS | DIASTOLIC BLOOD PRESSURE: 62 MMHG | HEIGHT: 65 IN

## 2020-02-03 DIAGNOSIS — M25.561 CHRONIC PAIN OF RIGHT KNEE: Primary | ICD-10-CM

## 2020-02-03 DIAGNOSIS — G89.29 CHRONIC PAIN OF RIGHT KNEE: Primary | ICD-10-CM

## 2020-02-03 DIAGNOSIS — N93.9 ABNORMAL UTERINE BLEEDING (AUB): ICD-10-CM

## 2020-02-03 LAB — TSH SERPL DL<=0.005 MIU/L-ACNC: 2.88 MU/L (ref 0.4–4)

## 2020-02-03 PROCEDURE — 84443 ASSAY THYROID STIM HORMONE: CPT | Performed by: OBSTETRICS & GYNECOLOGY

## 2020-02-03 PROCEDURE — 99213 OFFICE O/P EST LOW 20 MIN: CPT | Performed by: PHYSICIAN ASSISTANT

## 2020-02-03 PROCEDURE — 36415 COLL VENOUS BLD VENIPUNCTURE: CPT | Performed by: OBSTETRICS & GYNECOLOGY

## 2020-02-03 ASSESSMENT — MIFFLIN-ST. JEOR: SCORE: 1444.71

## 2020-02-03 NOTE — PROGRESS NOTES
Subjective     Geneva Cooley is a 34 year old female who presents to clinic today for the following health issues:    HPI   Joint Pain    Onset: has been ongoing for awhile.     Description:   Location: right knee  Character: makes a weird sound when she bends her knee, sounds like velcro coming undone. When she is going down the stairs she feels like her knee is going to give out.     Intensity: nothing really for pain right now, 3-10/10 from bending to its absolute worst.     Progression of Symptoms: worse    Accompanying Signs & Symptoms:  Other symptoms: none    History:   Previous similar pain: YES- right knee, she thinks she had an issue with in the past roughly 8 years ago.      Precipitating factors:   Trauma or overuse: no, but did really start when she lost a bunch of weight.      Alleviating factors:  Improved by: nothing    Therapies Tried and outcome: ice    - No injuries recently, back in 2012 she had an injury where she twisted her leg.  She did get an x-ray at that time and was negative for fracture.  With time it seemed to heal, hasn't had persistent pain since then.   - the knee making noises and having pain has been going on a while, probably a couple of years but more recently getting worse.   - mainly notices pain and noise with going downstairs or if she is bending the knee a lot.   - With her weight loss she has noticed it even more now.   - She denies any new injuries.   - No numbness/tingling of the lower leg.     Patient Active Problem List   Diagnosis     Insomnia     Knee strain     Anxiety     Major depressive disorder, single episode, moderate (H)     Chronic bilateral low back pain without sciatica     Family history of breast cancer in mother     Neives splints     Family history of rheumatoid arthritis     Bariatric surgery status     Obesity, Class I, BMI 30-34.9     Postsurgical malabsorption     Past Surgical History:   Procedure Laterality Date     COMBINED LAPAROSCOPIC GASTRIC  "SLEEVE, CHOLECYSTECTOMY N/A 2/5/2018    Procedure: COMBINED LAPAROSCOPIC GASTRIC SLEEVE, CHOLECYSTECTOMY;   LAPAROSCOPIC GASTRIC SLEEVE ;  Surgeon: Yoel Lowry MD;  Location:  OR       Social History     Tobacco Use     Smoking status: Never Smoker     Smokeless tobacco: Never Used     Tobacco comment: smoked at age 17    Substance Use Topics     Alcohol use: No     Alcohol/week: 0.0 standard drinks     Comment: rarely     Family History   Problem Relation Age of Onset     Hypertension Mother      Diabetes Mother      Cerebrovascular Disease Mother 46        aneurysm     Lipids Mother      Depression Mother      Breast Cancer Mother 60     Hypertension Father      Arthritis Father      Heart Disease Father         MI in late 40's      Lipids Father      Depression Sister      Hypertension Sister      Hypertension Brother          Current Outpatient Medications   Medication Sig Dispense Refill     Calcium-Vitamin D-Vitamin K (VIACTIV CALCIUM PLUS D) 650-12.5-40 MG-MCG-MCG CHEW Take 2 chew tab by mouth daily       desogestrel-ethinyl estradiol (APRI) 0.15-30 MG-MCG tablet Take 1 tablet by mouth daily , skip placebo week and start a new pack.  Take placebo week every 3rd pack. 84 tablet 4     omeprazole (PRILOSEC) 20 MG DR capsule TAKE ONE CAPSULE BY MOUTH ONCE DAILY 90 capsule 1     Prenatal Vit-Fe Fumarate-FA (PRENATAL PO)        VITAMIN D, CHOLECALCIFEROL, PO Take 5,000 Units by mouth daily       Allergies   Allergen Reactions     Vicodin [Hydrocodone-Acetaminophen] Hives       Reviewed and updated as needed this visit by Provider  Tobacco  Allergies  Meds  Problems  Med Hx  Surg Hx  Fam Hx         Review of Systems   ROS COMP: Constitutional, musculoskeletal, neuro, skin, endocrine systems are negative, except as otherwise noted.      Objective    /62   Pulse 70   Temp 98.5  F (36.9  C) (Temporal)   Resp 16   Ht 1.64 m (5' 4.57\")   Wt 74.6 kg (164 lb 6.4 oz)   LMP 01/10/2020 " (Approximate)   SpO2 98%   BMI 27.73 kg/m    Body mass index is 27.73 kg/m .  Physical Exam   GENERAL: healthy, alert and no distress  MS: no gross musculoskeletal defects noted, no edema.   RIGHT LE - crepitus with flexion and extension, non-tender to palpation of the entire knee joint and proximal and distal to the knee joint, very mild effusion noted and equal to the left.  No erythema, warmth, ecchymosis noted.  Positive McMurrays, slight increased valgus and varus stress compared to left, negative anterior and posterior drawer. Full passive ROM of the hip and knee without pain.   SKIN: no suspicious lesions or rashes  NEURO: Normal strength and tone, mentation intact and speech normal  PSYCH: mentation appears normal, affect normal/bright    Diagnostic Test Results:  Labs reviewed in Epic        Assessment & Plan       ICD-10-CM    1. Chronic pain of right knee M25.561 MR Knee Right w/o Contrast    G89.29           At this point recommend MRI to rule out internal cause of symptoms of crepitus, laxity, pain.  Do not feel x-ray would be appropriate given no injury/trauma recently and no bony tenderness.  Depending on imaging will refer to Sports medicine/Ortho.        Return in about 2 weeks (around 2/17/2020) for recheck if new symptoms pending MRI results. .     Options for treatment and follow-up care were reviewed with the patient and/or guardian. Patient and/or guardian engaged in the decision making process and verbalized understanding of the options discussed and agreed with the final plan.      Ludwig Pineda PA-C  [facility]

## 2020-02-06 ENCOUNTER — MYC MEDICAL ADVICE (OUTPATIENT)
Dept: FAMILY MEDICINE | Facility: OTHER | Age: 35
End: 2020-02-06

## 2020-02-06 ENCOUNTER — TELEPHONE (OUTPATIENT)
Dept: FAMILY MEDICINE | Facility: OTHER | Age: 35
End: 2020-02-06

## 2020-02-06 ENCOUNTER — HOSPITAL ENCOUNTER (OUTPATIENT)
Dept: MRI IMAGING | Facility: CLINIC | Age: 35
Discharge: HOME | End: 2020-02-06
Attending: PHYSICIAN ASSISTANT | Admitting: PHYSICIAN ASSISTANT
Payer: COMMERCIAL

## 2020-02-06 DIAGNOSIS — S86.911D STRAIN OF RIGHT KNEE, SUBSEQUENT ENCOUNTER: Primary | ICD-10-CM

## 2020-02-06 DIAGNOSIS — M25.561 CHRONIC PAIN OF RIGHT KNEE: ICD-10-CM

## 2020-02-06 DIAGNOSIS — G89.29 CHRONIC PAIN OF RIGHT KNEE: ICD-10-CM

## 2020-02-06 PROCEDURE — 73721 MRI JNT OF LWR EXTRE W/O DYE: CPT | Mod: RT

## 2020-02-06 NOTE — RESULT ENCOUNTER NOTE
Alonzo Bean    (I am covering for Ludwig Pineda PA-C, who is away from the office today.)  Your results did show some pathology in the knee. Per her note, recommend orthopedics for review of this imaging and further consult. I will place this referral and they will call you within 24 hours to assist in scheduling.     The results are attached for your review.       Yobani Cornejo PA-C

## 2020-02-20 ENCOUNTER — OFFICE VISIT (OUTPATIENT)
Dept: ORTHOPEDICS | Facility: OTHER | Age: 35
End: 2020-02-20
Payer: COMMERCIAL

## 2020-02-20 VITALS
DIASTOLIC BLOOD PRESSURE: 70 MMHG | HEIGHT: 65 IN | BODY MASS INDEX: 27.32 KG/M2 | SYSTOLIC BLOOD PRESSURE: 110 MMHG | WEIGHT: 164 LBS | RESPIRATION RATE: 16 BRPM

## 2020-02-20 DIAGNOSIS — M25.361 PATELLOFEMORAL INSTABILITY OF BOTH KNEES WITH PAIN: ICD-10-CM

## 2020-02-20 DIAGNOSIS — M25.562 PATELLOFEMORAL INSTABILITY OF BOTH KNEES WITH PAIN: ICD-10-CM

## 2020-02-20 DIAGNOSIS — Q68.2 PATELLA ALTA: Primary | ICD-10-CM

## 2020-02-20 DIAGNOSIS — M25.362 PATELLOFEMORAL INSTABILITY OF BOTH KNEES WITH PAIN: ICD-10-CM

## 2020-02-20 DIAGNOSIS — M25.561 PATELLOFEMORAL INSTABILITY OF BOTH KNEES WITH PAIN: ICD-10-CM

## 2020-02-20 PROCEDURE — 99243 OFF/OP CNSLTJ NEW/EST LOW 30: CPT | Performed by: ORTHOPAEDIC SURGERY

## 2020-02-20 ASSESSMENT — MIFFLIN-ST. JEOR: SCORE: 1441.84

## 2020-02-20 NOTE — LETTER
2/20/2020         RE: Geneva Cooley  [address]  [place] [facility] [address]        Dear Colleague,    Thank you for referring your patient, Geneva Cooley, to the [facility]. Please see a copy of my visit note below.    Geneva Cooley is a 34 year old female who is seen in consultation at the request of Ludwig Pineda PA-C  for bilateral knee pain, right > left.  She has had problems with the right knee for the last 8 years after a fall outside.  She has had a grinding popping sound in the right knee.  It is also starting in the left knee.  She has problems walking down stairs and sitting down.  She has aching constant pain rated 5 out of 10.  She is unable to do strengthening exercises because of pain in the knees.  She does report arthritis of the knees runs in her family.    MRI scan is been performed on the right knee on 2/6/2020.  Images are reviewed with the patient today.  This shows no ligamentous or meniscal tears.  It does show significant areas of full-thickness cartilage loss of the lateral facet of the patella with lateral patellar subluxation.  The trochlear groove is also very shallow.  It appears she has patella luis fernando.    Past Medical History:   Diagnosis Date     Depression      Shingles outbreak 2012       Past Surgical History:   Procedure Laterality Date     COMBINED LAPAROSCOPIC GASTRIC SLEEVE, CHOLECYSTECTOMY N/A 2/5/2018    Procedure: COMBINED LAPAROSCOPIC GASTRIC SLEEVE, CHOLECYSTECTOMY;   LAPAROSCOPIC GASTRIC SLEEVE ;  Surgeon: Yoel Lowry MD;  Location: [facility] OR       Family History   Problem Relation Age of Onset     Hypertension Mother      Diabetes Mother      Cerebrovascular Disease Mother 46        aneurysm     Lipids Mother      Depression Mother      Breast Cancer Mother 60     Hypertension Father      Arthritis Father      Heart Disease Father         MI in late 40's      Lipids Father      Depression Sister      Hypertension Sister       Hypertension Brother        Social History     Socioeconomic History     Marital status: Single     Spouse name: Not on file     Number of children: Not on file     Years of education: Not on file     Highest education level: Not on file   Occupational History     Employer: [employer]     Comment: school bus diver   Social Needs     Financial resource strain: Not on file     Food insecurity:     Worry: Not on file     Inability: Not on file     Transportation needs:     Medical: Not on file     Non-medical: Not on file   Tobacco Use     Smoking status: Never Smoker     Smokeless tobacco: Never Used     Tobacco comment: smoked at age 17    Substance and Sexual Activity     Alcohol use: No     Alcohol/week: 0.0 standard drinks     Comment: rarely     Drug use: No     Sexual activity: Not Currently     Partners: Male     Birth control/protection: Condom, Inserts/Ring   Lifestyle     Physical activity:     Days per week: Not on file     Minutes per session: Not on file     Stress: Not on file   Relationships     Social connections:     Talks on phone: Not on file     Gets together: Not on file     Attends Congregation service: Not on file     Active member of club or organization: Not on file     Attends meetings of clubs or organizations: Not on file     Relationship status: Not on file     Intimate partner violence:     Fear of current or ex partner: Not on file     Emotionally abused: Not on file     Physically abused: Not on file     Forced sexual activity: Not on file   Other Topics Concern     Parent/sibling w/ CABG, MI or angioplasty before 65F 55M? Yes     Comment: Dad- Heartattack, Mom- Stroke   Social History Narrative     Not on file       Current Outpatient Medications   Medication Sig Dispense Refill     Calcium-Vitamin D-Vitamin K (VIACTIV CALCIUM PLUS D) 650-12.5-40 MG-MCG-MCG CHEW Take 2 chew tab by mouth daily       etonogestrel-ethinyl estradiol (NUVARING) 0.12-0.015 MG/24HR vaginal ring INSERT ONE  "RING VAGINALLY FOR 3 WEEKS.REMOVE FOR ONE WEEK. THEN REPEAT WITH NEW RING 3 each 4     omeprazole (PRILOSEC) 20 MG DR capsule TAKE ONE CAPSULE BY MOUTH ONCE DAILY 90 capsule 1     Prenatal Vit-Fe Fumarate-FA (PRENATAL PO)        VITAMIN D, CHOLECALCIFEROL, PO Take 5,000 Units by mouth daily         Allergies   Allergen Reactions     Vicodin [Hydrocodone-Acetaminophen] Hives       REVIEW OF SYSTEMS:  CONSTITUTIONAL:  NEGATIVE for fever, chills, change in weight, not feeling tired  SKIN:  NEGATIVE for worrisome rashes, no skin lumps, no skin ulcers and no non-healing wounds  EYES:  NEGATIVE for vision changes or irritation.  ENT/MOUTH:  NEGATIVE.  No hearing loss, no hoarseness, no difficulty swallowing.  RESP:  NEGATIVE. No cough or shortness of breath.  CV:  NEGATIVE for chest pain, palpitations or peripheral edema  GI:  NEGATIVE for nausea, abdominal pain, heartburn, or change in bowel habits  :  Negative. No dysuria, no hematuria  MUSCULOSKELETAL:  See HPI above  NEURO:  NEGATIVE . No headaches, no dizziness,  no numbness  ENDOCRINE:  NEGATIVE for temperature intolerance, skin/hair changes  HEME/ALLERGY/IMMUNE:  NEGATIVE for bleeding problems  PSYCHIATRIC:  NEGATIVE. no anxiety, no depression.      Exam:  Vitals: /70   Resp 16   Ht 1.638 m (5' 4.5\")   Wt 74.4 kg (164 lb)   BMI 27.72 kg/m     BMI= Body mass index is 27.72 kg/m .  Constitutional:  healthy, alert and no distress  Neuro: Alert and Oriented x 3, Sensation grossly WNL.  HEENT:  Atraumatic, EOMI  Neck:  Neck supple with no tenderness.  Psych: Affect normal   Respiratory: Breathing not labored.  Cardiovascular: normal peripheral pulses  Lymph: no adenopathy  Skin: No rashes,worrisome lesions or skin problems  Spine: straight, no straight leg raising pain.  Hips show full range of motion.  There is no tenderness over the sacro-iliac joints, sciatic notch, or greater trochanters.   She has full range of motion of both knees.  She has severe " patellofemoral crepitation on the right knee and more moderate on the left.  She has positive patellar apprehension on both knees.  Positive patellar grind bilaterally.  There is no medial or lateral joint line tenderness on either knee.  No pain with varus valgus stress.  No ligamentous laxity of MCL, LCL, cruciates.  She had negative medial and lateral Mina except for pain under the patella.  No warmth or erythema.  Sensation, motor and circulation are intact.    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  Plan: We discussed anti-inflammatory use, strengthening, knee sleeves.  We also discussed possible surgery to stabilize the patellofemoral joints.  She is interested in pursuing this to postpone the need for knee replacements.  I discussed referral to Dr. Telles or Dr. Alessandra Hernandez.  She would like to be seen in [place].  I will refer to Dr. Telles for evaluation.    Again, thank you for allowing me to participate in the care of your patient.        Sincerely,        Orlando Vela MD

## 2020-02-24 ENCOUNTER — TELEPHONE (OUTPATIENT)
Dept: OTOLARYNGOLOGY | Facility: CLINIC | Age: 35
End: 2020-02-24

## 2020-02-24 NOTE — PROGRESS NOTES
"History of Present Illness - Geneva Cooley is a 34 year old female presenting in clinic today for a recheck on Patient presents with:  RECHECK: eustachian tube dysfunction     Patient was previously seen a year and a half ago for severe eustachian tube dysfunction symptoms.  However her symptoms involving her left ear primarily when not very representative of pure eustachian tubes function.  She does get pressure in the ear but it fails to necessarily open up \"pop\" and resolve temporarily.  She denies symptoms associated with patulous eustachian tube and sniffing actually does not help at all makes it worse.  She has tried eustachian tube exercises other conventional therapies nasal steroid sprays saline without any significant improvement.  She does not feel her hearing is changed at all in this period of time.  Denies any vertigo any dizziness any tinnitus.  She denies Uziel's phenomenon even though when she tries to open up her eustachian tube by forceful blowing gets a little dizzy but no true vertigo.      BP Readings from Last 1 Encounters:   02/20/20 110/70       BP noted to be well controlled today in office.     Geneva IS NOT a smoker/uses chewing tobacco.        Past Medical History -   Past Medical History:   Diagnosis Date     Depression      Shingles outbreak 2012       Current Medications -   Current Outpatient Medications:      Calcium-Vitamin D-Vitamin K (VIACTIV CALCIUM PLUS D) 650-12.5-40 MG-MCG-MCG CHEW, Take 2 chew tab by mouth daily, Disp: , Rfl:      etonogestrel-ethinyl estradiol (NUVARING) 0.12-0.015 MG/24HR vaginal ring, INSERT ONE RING VAGINALLY FOR 3 WEEKS.REMOVE FOR ONE WEEK. THEN REPEAT WITH NEW RING, Disp: 3 each, Rfl: 4     omeprazole (PRILOSEC) 20 MG DR capsule, TAKE ONE CAPSULE BY MOUTH ONCE DAILY, Disp: 90 capsule, Rfl: 1     Prenatal Vit-Fe Fumarate-FA (PRENATAL PO), , Disp: , Rfl:      VITAMIN D, CHOLECALCIFEROL, PO, Take 5,000 Units by mouth daily, Disp: , Rfl:     Allergies - "   Allergies   Allergen Reactions     Vicodin [Hydrocodone-Acetaminophen] Hives       Social History -   Social History     Socioeconomic History     Marital status: Single     Spouse name: Not on file     Number of children: Not on file     Years of education: Not on file     Highest education level: Not on file   Occupational History     Employer: [employer]     Comment: school bus diver   Social Needs     Financial resource strain: Not on file     Food insecurity:     Worry: Not on file     Inability: Not on file     Transportation needs:     Medical: Not on file     Non-medical: Not on file   Tobacco Use     Smoking status: Never Smoker     Smokeless tobacco: Never Used     Tobacco comment: smoked at age 17    Substance and Sexual Activity     Alcohol use: No     Alcohol/week: 0.0 standard drinks     Comment: rarely     Drug use: No     Sexual activity: Not Currently     Partners: Male     Birth control/protection: Condom, Inserts/Ring   Lifestyle     Physical activity:     Days per week: Not on file     Minutes per session: Not on file     Stress: Not on file   Relationships     Social connections:     Talks on phone: Not on file     Gets together: Not on file     Attends Faith service: Not on file     Active member of club or organization: Not on file     Attends meetings of clubs or organizations: Not on file     Relationship status: Not on file     Intimate partner violence:     Fear of current or ex partner: Not on file     Emotionally abused: Not on file     Physically abused: Not on file     Forced sexual activity: Not on file   Other Topics Concern     Parent/sibling w/ CABG, MI or angioplasty before 65F 55M? Yes     Comment: Dad- Heartattack, Mom- Stroke   Social History Narrative     Not on file       Family History -   Family History   Problem Relation Age of Onset     Hypertension Mother      Diabetes Mother      Cerebrovascular Disease Mother 46        aneurysm     Lipids Mother       Depression Mother      Breast Cancer Mother 60     Hypertension Father      Arthritis Father      Heart Disease Father         MI in late 40's      Lipids Father      Depression Sister      Hypertension Sister      Hypertension Brother        Review of Systems - As per HPI and PMHx, otherwise review of system review of the head and neck negative. Otherwise 10+ review of system is negative    Physical Exam  There were no vitals taken for this visit.  BMI: There is no height or weight on file to calculate BMI.    General - The patient is well nourished and well developed, and appears to have good nutritional status.  Alert and oriented to person and place, answers questions and cooperates with examination appropriately.    SKIN - No suspicious lesions or rashes.  Respiration - No respiratory distress.  Head and Face - Normocephalic and atraumatic, with no gross asymmetry noted of the contour of the facial features.  The facial nerve is intact, with strong symmetric movements.    Voice and Breathing - The patient was breathing comfortably without the use of accessory muscles. The patients voice was clear and strong, and had appropriate pitch and quality.    Ears - Bilateral pinna and EACs with normal appearing overlying skin. Tympanic membrane intact with good mobility on pneumatic otoscopy bilaterally. Bony landmarks of the ossicular chain are normal. The tympanic membranes are normal in appearance. No retraction, perforation, or masses.  No fluid or purulence was seen in the external canal or the middle ear.     Eyes - Extraocular movements intact.  Sclera were not icteric or injected, conjunctiva were pink and moist.    Mouth - Examination of the oral cavity showed pink, healthy oral mucosa. No lesions or ulcerations noted.  The tongue was mobile and midline, and the dentition were in good condition.      Throat - The walls of the oropharynx were smooth, pink, moist, symmetric, and had no lesions or ulcerations.   The tonsillar pillars and soft palate were symmetric. Tonsils are symmetric. The uvula was midline on elevation.    Neck - Normal midline excursion of the laryngotracheal complex during swallowing.  Full range of motion on passive movement.  Palpation of the occipital, submental, submandibular, internal jugular chain, and supraclavicular nodes did not demonstrate any abnormal lymph nodes or masses.  The carotid pulse was palpable bilaterally.  Palpation of the thyroid was soft and smooth, with no nodules or goiter appreciated.  The trachea was mobile and midline.    Nose - External contour is symmetric, no gross deflection or scars.  Nasal mucosa is pink and moist with no abnormal mucus.  The septum was midline and non-obstructive, turbinates of normal size and position.  No polyps, masses, or purulence noted on examination.    Neuro - Nonfocal neuro exam is normal, CN 2 through 12 intact, normal gait and muscle tone.      Performed in clinic today:  No procedures preformed in clinic today      A/P - Genevabrandan Cooley is a 34 year old female Patient presents with:  RECHECK: eustachian tube dysfunction     Unfortunate patient is in her today and cannot undergo audiogram or discuss any further treatment.  Greta discussed possibility of simply myringotomy to see how she responds to controlled opening since her symptoms not very common garden-variety eustachian tube dysfunction symptoms.  This will be done before any conversion to tube.  Patient will come back in next few weeks where audiogram will be done which she will at that point discuss myringotomy again and possibly will proceed with it at that time.      Osmani Banegas MD

## 2020-02-24 NOTE — TELEPHONE ENCOUNTER
Call returned to patient. LVM regarding clarification as to wether tubes will be inserted at her 2/26/2020 appointment. Patient was last seen by Dr. Banegas on 8/29/2018. Writing nurse informed patient that Dr. Banegas will decide at the time of the appointment since it has been such a long time since her last appointment. Requested that patient return call to the clinic for further information.   Lucie George RN on 2/24/2020 at 1:19 PM

## 2020-02-24 NOTE — TELEPHONE ENCOUNTER
Reason for Call:  Other appointment and call back    Detailed comments: Patient would like to clarify if she will be having the tubes put in on Wednesday?  She is scheduled at 9:30am in clinic and has been waiting to have this done and is hoping the answer is yes.  Please call and confirm this.  She is also asking if she will need a ?    Phone Number Patient can be reached at: Home number on file [phone] (home)    Best Time: any    Can we leave a detailed message on this number? YES    Call taken on 2/24/2020 at 11:04 AM by Brianne Holloway

## 2020-02-25 NOTE — TELEPHONE ENCOUNTER
Appointment notes says follow up tubes??     Cordelia PRIETO, Lead RN, BSN. . .  2/25/2020, 9:02 AM

## 2020-02-26 ENCOUNTER — OFFICE VISIT (OUTPATIENT)
Dept: AUDIOLOGY | Facility: OTHER | Age: 35
End: 2020-02-26
Payer: COMMERCIAL

## 2020-02-26 ENCOUNTER — OFFICE VISIT (OUTPATIENT)
Dept: OTOLARYNGOLOGY | Facility: OTHER | Age: 35
End: 2020-02-26
Payer: COMMERCIAL

## 2020-02-26 VITALS
SYSTOLIC BLOOD PRESSURE: 100 MMHG | DIASTOLIC BLOOD PRESSURE: 60 MMHG | WEIGHT: 164 LBS | HEIGHT: 65 IN | BODY MASS INDEX: 27.32 KG/M2

## 2020-02-26 DIAGNOSIS — Z53.9 ERRONEOUS ENCOUNTER--DISREGARD: Primary | ICD-10-CM

## 2020-02-26 DIAGNOSIS — H69.92 EUSTACHIAN TUBE DYSFUNCTION, LEFT: Primary | ICD-10-CM

## 2020-02-26 PROCEDURE — 100000 ZZC ERRONEOUS ENCOUNTER--DISREGARD: Performed by: AUDIOLOGIST

## 2020-02-26 PROCEDURE — 99213 OFFICE O/P EST LOW 20 MIN: CPT | Performed by: OTOLARYNGOLOGY

## 2020-02-26 ASSESSMENT — MIFFLIN-ST. JEOR: SCORE: 1441.84

## 2020-02-26 NOTE — TELEPHONE ENCOUNTER
I see patient was seen clinic to day. Will close this encounter.  Yari Contreras RN on 2/26/2020 at 5:28 PM

## 2020-02-26 NOTE — LETTER
"    2/26/2020         RE: Geneva Cooley  [address]  [facility] [address]        Dear Colleague,    Thank you for referring your patient, Geneva Cooley, to the [facility]. Please see a copy of my visit note below.    History of Present Illness - Geneva Cooley is a 34 year old female presenting in clinic today for a recheck on Patient presents with:  RECHECK: eustachian tube dysfunction     Patient was previously seen a year and a half ago for severe eustachian tube dysfunction symptoms.  However her symptoms involving her left ear primarily when not very representative of pure eustachian tubes function.  She does get pressure in the ear but it fails to necessarily open up \"pop\" and resolve temporarily.  She denies symptoms associated with patulous eustachian tube and sniffing actually does not help at all makes it worse.  She has tried eustachian tube exercises other conventional therapies nasal steroid sprays saline without any significant improvement.  She does not feel her hearing is changed at all in this period of time.  Denies any vertigo any dizziness any tinnitus.  She denies Uziel's phenomenon even though when she tries to open up her eustachian tube by forceful blowing gets a little dizzy but no true vertigo.      BP Readings from Last 1 Encounters:   02/20/20 110/70       BP noted to be well controlled today in office.     Geneva IS NOT a smoker/uses chewing tobacco.        Past Medical History -   Past Medical History:   Diagnosis Date     Depression      Shingles outbreak 2012       Current Medications -   Current Outpatient Medications:      Calcium-Vitamin D-Vitamin K (VIACTIV CALCIUM PLUS D) 650-12.5-40 MG-MCG-MCG CHEW, Take 2 chew tab by mouth daily, Disp: , Rfl:      etonogestrel-ethinyl estradiol (NUVARING) 0.12-0.015 MG/24HR vaginal ring, INSERT ONE RING VAGINALLY FOR 3 WEEKS.REMOVE FOR ONE WEEK. THEN REPEAT WITH NEW RING, Disp: 3 each, Rfl: 4     omeprazole " (PRILOSEC) 20 MG DR capsule, TAKE ONE CAPSULE BY MOUTH ONCE DAILY, Disp: 90 capsule, Rfl: 1     Prenatal Vit-Fe Fumarate-FA (PRENATAL PO), , Disp: , Rfl:      VITAMIN D, CHOLECALCIFEROL, PO, Take 5,000 Units by mouth daily, Disp: , Rfl:     Allergies -   Allergies   Allergen Reactions     Vicodin [Hydrocodone-Acetaminophen] Hives       Social History -   Social History     Socioeconomic History     Marital status: Single     Spouse name: Not on file     Number of children: Not on file     Years of education: Not on file     Highest education level: Not on file   Occupational History     Employer: [employer]     Comment: school bus diver   Social Needs     Financial resource strain: Not on file     Food insecurity:     Worry: Not on file     Inability: Not on file     Transportation needs:     Medical: Not on file     Non-medical: Not on file   Tobacco Use     Smoking status: Never Smoker     Smokeless tobacco: Never Used     Tobacco comment: smoked at age 17    Substance and Sexual Activity     Alcohol use: No     Alcohol/week: 0.0 standard drinks     Comment: rarely     Drug use: No     Sexual activity: Not Currently     Partners: Male     Birth control/protection: Condom, Inserts/Ring   Lifestyle     Physical activity:     Days per week: Not on file     Minutes per session: Not on file     Stress: Not on file   Relationships     Social connections:     Talks on phone: Not on file     Gets together: Not on file     Attends Yazdanism service: Not on file     Active member of club or organization: Not on file     Attends meetings of clubs or organizations: Not on file     Relationship status: Not on file     Intimate partner violence:     Fear of current or ex partner: Not on file     Emotionally abused: Not on file     Physically abused: Not on file     Forced sexual activity: Not on file   Other Topics Concern     Parent/sibling w/ CABG, MI or angioplasty before 65F 55M? Yes     Comment: Dad- Heartattack,  Mom- Stroke   Social History Narrative     Not on file       Family History -   Family History   Problem Relation Age of Onset     Hypertension Mother      Diabetes Mother      Cerebrovascular Disease Mother 46        aneurysm     Lipids Mother      Depression Mother      Breast Cancer Mother 60     Hypertension Father      Arthritis Father      Heart Disease Father         MI in late 40's      Lipids Father      Depression Sister      Hypertension Sister      Hypertension Brother        Review of Systems - As per HPI and PMHx, otherwise review of system review of the head and neck negative. Otherwise 10+ review of system is negative    Physical Exam  There were no vitals taken for this visit.  BMI: There is no height or weight on file to calculate BMI.    General - The patient is well nourished and well developed, and appears to have good nutritional status.  Alert and oriented to person and place, answers questions and cooperates with examination appropriately.    SKIN - No suspicious lesions or rashes.  Respiration - No respiratory distress.  Head and Face - Normocephalic and atraumatic, with no gross asymmetry noted of the contour of the facial features.  The facial nerve is intact, with strong symmetric movements.    Voice and Breathing - The patient was breathing comfortably without the use of accessory muscles. The patients voice was clear and strong, and had appropriate pitch and quality.    Ears - Bilateral pinna and EACs with normal appearing overlying skin. Tympanic membrane intact with good mobility on pneumatic otoscopy bilaterally. Bony landmarks of the ossicular chain are normal. The tympanic membranes are normal in appearance. No retraction, perforation, or masses.  No fluid or purulence was seen in the external canal or the middle ear.     Eyes - Extraocular movements intact.  Sclera were not icteric or injected, conjunctiva were pink and moist.    Mouth - Examination of the oral cavity showed pink,  healthy oral mucosa. No lesions or ulcerations noted.  The tongue was mobile and midline, and the dentition were in good condition.      Throat - The walls of the oropharynx were smooth, pink, moist, symmetric, and had no lesions or ulcerations.  The tonsillar pillars and soft palate were symmetric. Tonsils are symmetric. The uvula was midline on elevation.    Neck - Normal midline excursion of the laryngotracheal complex during swallowing.  Full range of motion on passive movement.  Palpation of the occipital, submental, submandibular, internal jugular chain, and supraclavicular nodes did not demonstrate any abnormal lymph nodes or masses.  The carotid pulse was palpable bilaterally.  Palpation of the thyroid was soft and smooth, with no nodules or goiter appreciated.  The trachea was mobile and midline.    Nose - External contour is symmetric, no gross deflection or scars.  Nasal mucosa is pink and moist with no abnormal mucus.  The septum was midline and non-obstructive, turbinates of normal size and position.  No polyps, masses, or purulence noted on examination.    Neuro - Nonfocal neuro exam is normal, CN 2 through 12 intact, normal gait and muscle tone.      Performed in clinic today:  No procedures preformed in clinic today      A/P - Geneva NORRIS Yasir is a 34 year old female Patient presents with:  RECHECK: eustachian tube dysfunction     Unfortunate patient is in her today and cannot undergo audiogram or discuss any further treatment.  Greta discussed possibility of simply myringotomy to see how she responds to controlled opening since her symptoms not very common garden-variety eustachian tube dysfunction symptoms.  This will be done before any conversion to tube.  Patient will come back in next few weeks where audiogram will be done which she will at that point discuss myringotomy again and possibly will proceed with it at that time.      Osmani Banegas MD        Again, thank you for allowing me to  participate in the care of your patient.        Sincerely,        Osmani Banegas MD, MD

## 2020-04-13 ENCOUNTER — MYC MEDICAL ADVICE (OUTPATIENT)
Dept: FAMILY MEDICINE | Facility: OTHER | Age: 35
End: 2020-04-13

## 2020-04-14 NOTE — TELEPHONE ENCOUNTER
Will flag for provider to review.  Would you suggest a visit or what would be her next steps?    Jed Wesley, RN, BSN

## 2020-04-15 ENCOUNTER — VIRTUAL VISIT (OUTPATIENT)
Dept: FAMILY MEDICINE | Facility: OTHER | Age: 35
End: 2020-04-15
Payer: COMMERCIAL

## 2020-04-15 DIAGNOSIS — N32.81 OAB (OVERACTIVE BLADDER): ICD-10-CM

## 2020-04-15 DIAGNOSIS — N94.10 DYSPAREUNIA, FEMALE: ICD-10-CM

## 2020-04-15 DIAGNOSIS — N93.9 ABNORMAL UTERINE BLEEDING (AUB): Primary | ICD-10-CM

## 2020-04-15 PROCEDURE — 99443 ZZC PHYSICIAN TELEPHONE EVALUATION 21-30 MIN: CPT | Performed by: STUDENT IN AN ORGANIZED HEALTH CARE EDUCATION/TRAINING PROGRAM

## 2020-04-15 NOTE — PROGRESS NOTES
"Geneva Cooley is a 34 year old female who is being evaluated via a billable telephone visit.      The patient has been notified of following:     \"This telephone visit will be conducted via a call between you and your physician/provider. We have found that certain health care needs can be provided without the need for a physical exam.  This service lets us provide the care you need with a short phone conversation.  If a prescription is necessary we can send it directly to your pharmacy.  If lab work is needed we can place an order for that and you can then stop by our lab to have the test done at a later time.    Telephone visits are billed at different rates depending on your insurance coverage. During this emergency period, for some insurers they may be billed the same as an in-person visit.  Please reach out to your insurance provider with any questions.    If during the course of the call the physician/provider feels a telephone visit is not appropriate, you will not be charged for this service.\"    Patient has given verbal consent for Telephone visit?  Yes    How would you like to obtain your AVS? declined    Subjective     Geneva Cooley is a 34 year old female who presents to clinic today for the following health issues:    URINARY TRACT SYMPTOMS  Onset: Years    Description:   Painful urination (Dysuria): no            Frequency: YES- mostly at night, 2-3 times per week, does not correlate with intercourse. Sometimes when she drinks milk prior to going to bed, even a sip will cause this to happen. Since bariatric surgery if she drinks too much milk will get a mild upset stomach. Not drinking much milk now. No swelling in legs. No burning with urination. No pain with urinating.  Goes back to lay down after urinating. Will feel like she has to go again and has urgency. Sometimes there will be nothing or a trickle will come out the 2nd and following attempts to urinate.  Blood in urine (Hematuria): no   Delay in " "urine (Hesitency): no     Intensity: mild    Progression of Symptoms:  worsening and waxing and waning    Accompanying Signs & Symptoms:  Fever/chills: no   Flank pain no   Nausea and vomiting: no   Any vaginal symptoms: abnormal vaginal bleeding, dyspareunia (pain in labia/pelvis with intercourse) has been changing birth control and bleeding has been unusual.  Abdominal/Pelvic Pain: no     History:   History of frequent UTI's: no   History of kidney stones: no   Sexually Active: YES  Possibility of pregnancy: Don't Know    Precipitating factors:   Nothing     Therapies Tried and outcome:  tylenol      Got a different Nuvaring - insurance change, [employer], generic. Was told that because she can forget to take it out. Will take it out when she starts to spot. Started to spot and put a new one in and then had period for 3 weeks. Happened a couple times having period for 3 weeks. Was switched to OCP and was a \"freakin' wreak\", very emotional, affecting her relationships. Stopped the OCP and restarted Nuvaring. She decided to try taking on OCP pill when she spotted and this would cause her period to stop and then also replace the Nuvaring.   Her most recent period ended on Wednesday, had intercourse on Saturday and Monday morning, heavy flow on Monday morning that tapered off and today no bleeding. Has not had a Nuvaring in as she was thinking she would take a break from it. During intercourse on Saturday partner ejaculated inside. Her last pregnancy, was last day of period when she had intercourse and became pregnant. Partner considering having vasectomy. He is in his 50s and not interested in having more children.     She does not like Nuvaring also because when she takes tampon out in public restroom, ring has fallen in toilet. Tried getting an IUD but position of uterus/cervix has made it difficult. Would like to try again.     She has intermittent vaginal dryness with intercourse. Does not happen every time. Happened " on Monday morning. Has not been using lubricants.     34 minutes    Patient Active Problem List   Diagnosis     Insomnia     Knee strain     Anxiety     Major depressive disorder, single episode, moderate (H)     Chronic bilateral low back pain without sciatica     Family history of breast cancer in mother     Nieves splints     Family history of rheumatoid arthritis     Bariatric surgery status     Obesity, Class I, BMI 30-34.9     Postsurgical malabsorption     Patellofemoral instability of both knees with pain     Patella luis fernando     Past Surgical History:   Procedure Laterality Date     COMBINED LAPAROSCOPIC GASTRIC SLEEVE, CHOLECYSTECTOMY N/A 2/5/2018    Procedure: COMBINED LAPAROSCOPIC GASTRIC SLEEVE, CHOLECYSTECTOMY;   LAPAROSCOPIC GASTRIC SLEEVE ;  Surgeon: Yoel Lowry MD;  Location:  OR       Social History     Tobacco Use     Smoking status: Never Smoker     Smokeless tobacco: Never Used     Tobacco comment: smoked at age 17    Substance Use Topics     Alcohol use: No     Alcohol/week: 0.0 standard drinks     Comment: rarely     Family History   Problem Relation Age of Onset     Hypertension Mother      Diabetes Mother      Cerebrovascular Disease Mother 46        aneurysm     Lipids Mother      Depression Mother      Breast Cancer Mother 60     Hypertension Father      Arthritis Father      Heart Disease Father         MI in late 40's      Lipids Father      Depression Sister      Hypertension Sister      Hypertension Brother          Current Outpatient Medications   Medication Sig Dispense Refill     Calcium-Vitamin D-Vitamin K (VIACTIV CALCIUM PLUS D) 650-12.5-40 MG-MCG-MCG CHEW Take 2 chew tab by mouth daily       omeprazole (PRILOSEC) 20 MG DR capsule TAKE ONE CAPSULE BY MOUTH ONCE DAILY 90 capsule 1     Prenatal Vit-Fe Fumarate-FA (PRENATAL PO)        VITAMIN D, CHOLECALCIFEROL, PO Take 5,000 Units by mouth daily       etonogestrel-ethinyl estradiol (NUVARING) 0.12-0.015 MG/24HR vaginal ring  INSERT ONE RING VAGINALLY FOR 3 WEEKS.REMOVE FOR ONE WEEK. THEN REPEAT WITH NEW RING (Patient not taking: Reported on 4/15/2020) 3 each 4     Allergies   Allergen Reactions     Vicodin [Hydrocodone-Acetaminophen] Hives     Recent Labs   Lab Test 02/03/20  1024 02/14/18  1300 02/06/18  0850 02/05/18  1700 01/26/18  1029 07/03/17  1305 01/16/17  1221  04/17/12  1155   A1C  --   --   --   --   --  5.3 5.5  --   --    LDL  --   --   --   --   --   --   --   --  110   HDL  --   --   --   --   --   --   --   --  39*   TRIG  --   --   --   --   --   --   --   --  191*   ALT  --   --   --   --   --  24  --   --   --    CR  --   --   --  0.49* 0.58 0.63  --    < > 0.60   GFRESTIMATED  --   --   --  >90 >90 >90  Non African American GFR Calc    --    < > >90   GFRESTBLACK  --   --   --  >90 >90 >90  African American GFR Calc    --    < > >90   POTASSIUM  --  3.6 3.9  --  4.3 3.9  --    < > 4.3   TSH 2.88  --   --   --   --   --  2.28   < > 3.36    < > = values in this interval not displayed.      BP Readings from Last 3 Encounters:   02/26/20 100/60   02/20/20 110/70   02/03/20 100/62    Wt Readings from Last 3 Encounters:   02/26/20 74.4 kg (164 lb)   02/20/20 74.4 kg (164 lb)   02/03/20 74.6 kg (164 lb 6.4 oz)                    Reviewed and updated as needed this visit by Provider         Review of Systems   ROS COMP: Constitutional, HEENT, cardiovascular, pulmonary, GI, , musculoskeletal, neuro, skin, endocrine and psych systems are negative, except as otherwise noted.       Objective   Reported vitals:  There were no vitals taken for this visit.   alert and no distress  PSYCH: Alert and oriented times 3; coherent speech, normal   rate and volume, able to articulate logical thoughts, able   to abstract reason, no tangential thoughts  RESP: No cough, no audible wheezing, able to talk in full sentences  Remainder of exam unable to be completed due to telephone visits    Diagnostic Test Results:  Labs reviewed in Epic         Assessment/Plan:  1. Abnormal uterine bleeding (AUB)  Discussed at length menstrual cycle that at times is lasting up to 3 weeks. We discussed that she does not remove and replace Nuvaring until she starts to spot (because she forgets to regularly remove every 3 weeks) and this may cause AUB. She is not liking Nuvaring because she has to try to remember to remove and replace and has also had it fall out into the toilet when removing her tampon. She would like to get an IUD but there has been difficulty with trying to get one in the past due to her anatomy. She also had unprotected intercourse and is concerned about possible pregnancy as she got pregnant with her daughter on the last day of her period. I recommended she wait about a month and if she does not get her period to have a pregnancy test. If she does get her period, we will find a provider to try again to place an IUD. She is agreeable to this plan.     2. OAB (overactive bladder)  This is intermittent at night. Character of symptoms seem to correlate with OAB and unsure what triggers it on those specific nights. She can use AZO prn but told her this is not recommended as long-term use and certainly if symptoms increase in frequency. Would like her to do a UA at some point though she has no UTI symptoms, would like to make sure there is no hematuria or glucose in urine. Follow up if symptoms persist or worsen.     3. Dyspareunia, female  Due to vaginal dryness intermittently. Discussed that this may be due to poor arousal during morning intercourse as this is when it seems to happen. Recommend use of sufficient lubricant. Follow up if symptoms persist or worsen.         Return in about 4 weeks (around 5/13/2020) for pregnancy test, possible IUD.      Phone call duration:  34 minutes    ADAN Lobo

## 2020-04-20 ENCOUNTER — MYC MEDICAL ADVICE (OUTPATIENT)
Dept: FAMILY MEDICINE | Facility: OTHER | Age: 35
End: 2020-04-20

## 2020-05-11 ENCOUNTER — TELEPHONE (OUTPATIENT)
Dept: FAMILY MEDICINE | Facility: OTHER | Age: 35
End: 2020-05-11

## 2020-05-11 NOTE — TELEPHONE ENCOUNTER
Will have provider review if appropriate when he returns to clinic on Wednesday or if he checks basket from home. However if she passes screening questions not sure why she could not keep appt as scheduled.      Phoebe Nguyen CMA (AAMA)

## 2020-05-11 NOTE — TELEPHONE ENCOUNTER
Reason for Call:  Other appointment    Detailed comments: Patient is needing an IUD placed. She has been added to Dr Warren' schedule to hold the slot due to him being the only provider available that can do IUD placements and removals for this week. Please review.     Phone Number Patient can be reached at: Home number on file [phone] (home)    Best Time: any    Can we leave a detailed message on this number? YES    Call taken on 5/11/2020 at 4:32 PM by Kristy Rodriges

## 2020-05-12 NOTE — PROGRESS NOTES
SUBJECTIVE:    Is a pregnancy test required: Yes.  Was it positive or negative?  Negative  Was a consent obtained?  Yes    Subjective: Geneva Cooley is a 34 year old  presents for IUD and desires Mirena type IUD.  She requests removal of the IUD because No removal      She tried to get an IUD previously but wasn't able to successfully have it placed.  She wasn't on her period last time she tried to get an IUD.      Patient has been given the opportunity to ask questions about all forms of birth control, including all options appropriate for Geneva Cooley. Discussed that no method of birth control, except abstinence is 100% effective against pregnancy or sexually transmitted infection.     Geneva Cooley understands she may have the IUD removed at any time. IUD should be removed by a health care provider and the current IUD will be removed today.    The entire removal and insertion procedure was reviewed with the patient, including care after placement.    Today's PHQ-2 Score:   PHQ-2 (  [employer]) 2019   Q1: Little interest or pleasure in doing things 1   Q2: Feeling down, depressed or hopeless 0   PHQ-2 Score 1   Q1: Little interest or pleasure in doing things Several days   Q2: Feeling down, depressed or hopeless Not at all   PHQ-2 Score 1       PROCEDURE:      Under sterile technique, cervix was visualized with speculum and prepped with Betadine solution swab x 3. Tenaculum was placed for stability. The uterus was gently straightened and sounded to 8.0 cm. Pt had significant discomfort with traversing the cervical canal.  IUD prepared for placement, and IUD inserted according to 's instructions with minor difficulty and pt discomfort, but no significant ressitance, and deployed at the fundus. The strings were visualized and trimmed to 8.0 cm from the external os. Tenaculum was removed and hemostasis noted. Speculum removed.  Patient tolerated procedure well overall.    Lot #  [id number]  Exp: 04/2020    EBL: minimal    Complications: none      POST PROCEDURE:    Given 's handouts, including when to have IUD removed, list of danger s/sx, side effects and follow up recommended.  Advised to call for any fever, for prolonged or severe pain or bleeding, abnormal vaginal dischage, or unable to palpate strings. She was advised to use pain medications as needed for mild to moderate pain. Advised to follow-up in clinic in 4-6 weeks for IUD string check if unable to find strings or as directed by provider.     Hal Warren MD, MD

## 2020-05-14 ENCOUNTER — APPOINTMENT (OUTPATIENT)
Dept: FAMILY MEDICINE | Facility: OTHER | Age: 35
End: 2020-05-14
Payer: COMMERCIAL

## 2020-05-14 ENCOUNTER — OFFICE VISIT (OUTPATIENT)
Dept: FAMILY MEDICINE | Facility: OTHER | Age: 35
End: 2020-05-14
Payer: COMMERCIAL

## 2020-05-14 VITALS
HEIGHT: 65 IN | BODY MASS INDEX: 27.16 KG/M2 | DIASTOLIC BLOOD PRESSURE: 64 MMHG | OXYGEN SATURATION: 98 % | RESPIRATION RATE: 16 BRPM | TEMPERATURE: 98 F | HEART RATE: 104 BPM | SYSTOLIC BLOOD PRESSURE: 102 MMHG | WEIGHT: 163 LBS

## 2020-05-14 DIAGNOSIS — Z30.014 ENCOUNTER FOR INITIAL PRESCRIPTION OF INTRAUTERINE CONTRACEPTIVE DEVICE (IUD): ICD-10-CM

## 2020-05-14 DIAGNOSIS — Z30.430 ENCOUNTER FOR INSERTION OF INTRAUTERINE CONTRACEPTIVE DEVICE: Primary | ICD-10-CM

## 2020-05-14 LAB — HCG UR QL: NEGATIVE

## 2020-05-14 PROCEDURE — 99207 ZZC DROP WITH A PROCEDURE: CPT | Performed by: FAMILY MEDICINE

## 2020-05-14 PROCEDURE — 58300 INSERT INTRAUTERINE DEVICE: CPT | Performed by: FAMILY MEDICINE

## 2020-05-14 PROCEDURE — 81025 URINE PREGNANCY TEST: CPT | Performed by: FAMILY MEDICINE

## 2020-05-14 ASSESSMENT — MIFFLIN-ST. JEOR: SCORE: 1437.3

## 2020-05-14 NOTE — PATIENT INSTRUCTIONS
"Thank you for visiting [facility]    Let us know if you have increasing abdominal pain, fever, foul smelling discharge, excessive bleeding, or other concerns.    Can use tylenol or other pain management approaches for cramping.    Contact us or return if questions or concerns.     Have a nice day!    Dr. Warren     No follow-ups on file.      If you had imaging scheduled please refer to your radiology prep sheet.      If you need medication refills, please contact your pharmacy 3 days before your prescriptions runs out or download the [facility] pinky for your smart phone.   If you are out of refills, your pharmacy will contact contact the clinic.    Contact us or return if questions or concerns.     -Your [facility] Team:    MD Tiesha Gamez, YOSHI Perez PA-C Elizabeth \"Mary\" CLAIRE Coughlin CNP, APRN, CLAIRE Pedro, ISAC Wesley, RN, BSN       General information about your   [facility]      Clinic hours:     Lab hours:  Phone [phone]  Monday 7:30 am-7 pm    Monday 8:30 am-6:30 pm  Tuesday-Friday 7:30 am-5 pm   Tuesday-Friday 8:30 am-4:30 pm    Pharmacy hours:  Phone [phone]  Monday 8:30 am-7pm  Tuesday-Friday 8:30am-6 pm                                     Mychart assistance [phone]    We would like to hear from you, how was your visit today?    Tona Torres  Patient Information Supervisor   Patient Care Supervisor  [facility], and [facility]            "

## 2021-01-09 ENCOUNTER — HEALTH MAINTENANCE LETTER (OUTPATIENT)
Age: 36
End: 2021-01-09

## 2021-01-14 ENCOUNTER — OFFICE VISIT (OUTPATIENT)
Dept: FAMILY MEDICINE | Facility: CLINIC | Age: 36
End: 2021-01-14
Payer: COMMERCIAL

## 2021-01-14 VITALS
RESPIRATION RATE: 14 BRPM | WEIGHT: 173 LBS | DIASTOLIC BLOOD PRESSURE: 72 MMHG | BODY MASS INDEX: 29.24 KG/M2 | HEART RATE: 63 BPM | TEMPERATURE: 98.3 F | SYSTOLIC BLOOD PRESSURE: 136 MMHG | OXYGEN SATURATION: 98 %

## 2021-01-14 DIAGNOSIS — Z20.822 SUSPECTED COVID-19 VIRUS INFECTION: ICD-10-CM

## 2021-01-14 DIAGNOSIS — F32.1 MAJOR DEPRESSIVE DISORDER, SINGLE EPISODE, MODERATE (H): ICD-10-CM

## 2021-01-14 DIAGNOSIS — K21.9 GASTROESOPHAGEAL REFLUX DISEASE, UNSPECIFIED WHETHER ESOPHAGITIS PRESENT: Primary | ICD-10-CM

## 2021-01-14 DIAGNOSIS — R53.83 FATIGUE, UNSPECIFIED TYPE: ICD-10-CM

## 2021-01-14 PROCEDURE — 86769 SARS-COV-2 COVID-19 ANTIBODY: CPT | Mod: 59 | Performed by: FAMILY MEDICINE

## 2021-01-14 PROCEDURE — 99213 OFFICE O/P EST LOW 20 MIN: CPT | Performed by: FAMILY MEDICINE

## 2021-01-14 PROCEDURE — 86769 SARS-COV-2 COVID-19 ANTIBODY: CPT | Performed by: FAMILY MEDICINE

## 2021-01-14 PROCEDURE — 96127 BRIEF EMOTIONAL/BEHAV ASSMT: CPT | Performed by: FAMILY MEDICINE

## 2021-01-14 PROCEDURE — 36415 COLL VENOUS BLD VENIPUNCTURE: CPT | Performed by: FAMILY MEDICINE

## 2021-01-14 ASSESSMENT — ANXIETY QUESTIONNAIRES
1. FEELING NERVOUS, ANXIOUS, OR ON EDGE: SEVERAL DAYS
6. BECOMING EASILY ANNOYED OR IRRITABLE: SEVERAL DAYS
7. FEELING AFRAID AS IF SOMETHING AWFUL MIGHT HAPPEN: NOT AT ALL
GAD7 TOTAL SCORE: 5
2. NOT BEING ABLE TO STOP OR CONTROL WORRYING: SEVERAL DAYS
5. BEING SO RESTLESS THAT IT IS HARD TO SIT STILL: NOT AT ALL
3. WORRYING TOO MUCH ABOUT DIFFERENT THINGS: SEVERAL DAYS
7. FEELING AFRAID AS IF SOMETHING AWFUL MIGHT HAPPEN: NOT AT ALL
4. TROUBLE RELAXING: SEVERAL DAYS
GAD7 TOTAL SCORE: 5
GAD7 TOTAL SCORE: 5

## 2021-01-14 ASSESSMENT — PATIENT HEALTH QUESTIONNAIRE - PHQ9
SUM OF ALL RESPONSES TO PHQ QUESTIONS 1-9: 3
10. IF YOU CHECKED OFF ANY PROBLEMS, HOW DIFFICULT HAVE THESE PROBLEMS MADE IT FOR YOU TO DO YOUR WORK, TAKE CARE OF THINGS AT HOME, OR GET ALONG WITH OTHER PEOPLE: NOT DIFFICULT AT ALL
SUM OF ALL RESPONSES TO PHQ QUESTIONS 1-9: 3

## 2021-01-14 NOTE — LETTER
January 18, 2021      Geneva NORRIS Cooley  [address]   [place] [id number]        Dear ,    We are writing to inform you of your test results.    Your covid antibody test came back positive showing you have had a past infection.     Resulted Orders   COVID-19 Virus (Coronavirus) Antibody & Titer Reflex   Result Value Ref Range    COVID-19 Antibody Screen Positive       Comment:      Antibodies to COVID-19 detected, which may be due to past or current   infection.      COVID-19 Antibody, IgG Titer 1:1,600       Comment:      Qualitative screen for total antibodies to COVID-19 (SARS-CoV-2) with   semi-quantitative measurement of IgG COVID-19 antibodies by endpoint titer.    COVID-19 antibodies may be elevated due to a past or current infection.  Negative results do not rule out COVID-19 infection.  Results from antibody   testing should not be used as the sole basis to diagnose or exclude SARS-CoV-2   infection or to inform infection status.  COVID-19 PCR test should be ordered   if current infection is suspected.  False positive results may occur in rare   cases due to cross-reacting antibodies.  This test was developed and its performance characteristics determined by the   [facility] ([facility]),   which is regulated under CLIA as qualified to perform high-complexity testing.    This test has not been reviewed by the FDA.  Testing performed by [facility], [facility], [address], [address], [place] [address]       If you have any questions or concerns, please call the clinic at the number listed above.     Sincerely,    Ady Camara MD

## 2021-01-14 NOTE — PROGRESS NOTES
Assessment & Plan   1. Gastroesophageal reflux disease, unspecified whether esophagitis present: Exam is normal.  Given heartburn/reflux symptoms last night recommend taking her Nexium scheduled daily.  If symptoms worsen should be evaluated further.  No red flag symptoms.  No systemic symptoms concerning for anaphylaxis.  No other symptoms that make me concerned for a URI or other viral infection at this time.  Thyroid palpated and feels normal.  Again 1 day of symptoms which appear to be improving likely due to reflux and last night.  Follow-up if new or worsening symptoms.    2. Fatigue, unspecified type: Resolved.  Possibly due to missing her normal caffeine use.  If persistent should follow-up.  1 day of symptoms or not concerning.    3. Suspected COVID-19 virus infection: Patient previously ill in October.  Would like to have antibody testing to see if this is due to Covid.  Antibody test placed.  - COVID-19 Virus (Coronavirus) Antibody & Titer Reflex    4. Major depressive disorder, single episode, moderate (H): PHQ-9 filled out today.  No concerns.  Not specifically addressed at today's visit.  - EMOTIONAL / BEHAVIORAL ASSESSMENT      Return in about 2 weeks (around 1/28/2021), or if symptoms worsen or fail to improve.    Ady Camara MD  [facility]    This chart is completed utilizing dictation software; typos and/or incorrect word substitutions may unintentionally occur.    Princess Bean is a 35 year old who presents to clinic today for the following health issues    Answers for HPI/ROS submitted by the patient on 1/14/2021   Chronic problems general questions HPI Form  If you checked off any problems, how difficult have these problems made it for you to do your work, take care of things at home, or get along with other people?: Not difficult at all  PHQ9 TOTAL SCORE: 3  LILIA 7 TOTAL SCORE: 5    Concern - THROAT CONCERNS   Onset: this morning  Description:  having fatigue and feeling like her throat is swelling  Intensity: moderate  Progression of Symptoms:  worsening  Accompanying Signs & Symptoms: has not changed anything to have an allergic reaction  Previous history of similar problem: no  Precipitating factors:        Worsened by: na  Alleviating factors:        Improved by: na  Therapies tried and outcome:  none     Patient reports concern of tightness sensation in her throat since yesterday.  No difficulty breathing, change in voice, wheezing, rash, facial swelling etc.  Does not change with food.  Is likely slightly improved compared to yesterday per patient report.  Did have significant heartburn yesterday.  She treats this occasionally 1-2 times weekly with over-the-counter Nexium or Prilosec.  She denies any significant cough, fever, chills, nasal congestion, rhinorrhea, ear concerns today.  Denies any body aches.    No known ill contacts.  She states she did lose her sense of smell and taste in October during an acute illness and is wondering if this is due to Covid.  Would like antibody testing for this.    She works transporting tires.  Additionally she did have some fatigue this morning while driving.  She states she is not sure if this is due to missing her morning cup of coffee.  She slept fine last night and this is not usual for her.  This is since resolved however.    Given the episode of fatigue this morning as well as throat concerns her work wanted her to be seen today.    Review of Systems   Constitutional, HEENT, lymph, derm, msk, cardiovascular, pulmonary, gi and gu systems are negative, except as otherwise noted.      Objective    /72   Pulse 63   Temp 98.3  F (36.8  C) (Temporal)   Resp 14   Wt 78.5 kg (173 lb)   LMP  (LMP Unknown)   SpO2 98%   BMI 29.24 kg/m    Body mass index is 29.24 kg/m .  Physical Exam   General: Appears well and in no acute distress.  Heme/Lymph: No supraclavicular, anterior, or posterior cervical  lymphadenopathy.  Endocrine: Thyroid palpated without enlargement or nodules.  HEENT: Eyes grossly normal to inspection. Extraocular movements intact. Pupils equal, round, and reactive to light. Mucous membranes moist. No ulcers or lesions noted in the oropharynx.  Cardiovascular: Regular rate and rhythm, normal S1 and S2 without murmur. No extra heartsounds or friction rub. Radial pulses present and equal bilaterally.  Respiratory: Lungs clear to auscultation bilaterally. No wheezing or crackles. No prolonged expiration. Symmetrical chest rise.  Musculoskeletal: No gross extremity deformities. No peripheral edema. Normal muscle bulk.  GI/Rectal: Soft, non-tender abdomen. No hepatosplenomegaly. Normal active bowel sounds.    Labs pending

## 2021-01-15 ASSESSMENT — PATIENT HEALTH QUESTIONNAIRE - PHQ9: SUM OF ALL RESPONSES TO PHQ QUESTIONS 1-9: 3

## 2021-01-15 ASSESSMENT — ANXIETY QUESTIONNAIRES: GAD7 TOTAL SCORE: 5

## 2021-01-16 LAB
SARS-COV-2 AB PNL SERPL IA: POSITIVE
SARS-COV-2 IGG SERPL IA-ACNC: NORMAL

## 2021-04-25 ENCOUNTER — MYC MEDICAL ADVICE (OUTPATIENT)
Dept: FAMILY MEDICINE | Facility: OTHER | Age: 36
End: 2021-04-25

## 2021-05-04 NOTE — PROGRESS NOTES
"Geneva is a 35 year old who is being evaluated via a billable video visit.      How would you like to obtain your AVS? MyChart  If the video visit is dropped, the invitation should be resent by: Text to cell phone: [phone]  Will anyone else be joining your video visit? No    Video Start Time: 7:41 AM    Assessment & Plan     Class 1 obesity due to excess calories without serious comorbidity with body mass index (BMI) of 31.0 to 31.9 in adult  Shared decision making to restart phentermine to help her lose the weight that she has recently put on after bariatric surgery. She has been on this in the past and tolerated it well. She can start with 1/2 tablet and see if that is a high enough dose to suppress appetite and if not will increase to full tablet. Follow up in 3 months.    - phentermine (ADIPEX-P) 37.5 MG tablet; Take 1 tablet (37.5 mg) by mouth every morning (before breakfast)             BMI:   Estimated body mass index is 31.24 kg/m  as calculated from the following:    Height as of this encounter: 1.626 m (5' 4\").    Weight as of this encounter: 82.6 kg (182 lb).   Weight management plan: Discussed healthy diet and exercise guidelines        No follow-ups on file.    CLAIRE Lundberg CNP  [facility]    Princess Bean is a 35 year old who presents for the following health issues     HPI     Geneva is wanting to restart phentermine again to help boost weight loss. She weighed herself around 2 weeks ago and was about 180 lbs and has been fluctuating up to 182 lbs.     Was at 170 for a while. Gaining in the past couple months.       Review of Systems   Constitutional, HEENT, cardiovascular, pulmonary, gi and gu systems are negative, except as otherwise noted.      Objective           Vitals:  No vitals were obtained today due to virtual visit.    Physical Exam   GENERAL: Healthy, alert and no distress  EYES: Eyes grossly normal to inspection.  No discharge or erythema, or " obvious scleral/conjunctival abnormalities.  RESP: No audible wheeze, cough, or visible cyanosis.  No visible retractions or increased work of breathing.    SKIN: Visible skin clear. No significant rash, abnormal pigmentation or lesions.  NEURO: Cranial nerves grossly intact.  Mentation and speech appropriate for age.  PSYCH: Mentation appears normal, affect normal/bright, judgement and insight intact, normal speech and appearance well-groomed.              Video-Visit Details    Type of service:  Video Visit    Video End Time:7:52 am    Originating Location (pt. Location): Home    Distant Location (provider location):  [facility]     Platform used for Video Visit: "[employer]

## 2021-05-07 ENCOUNTER — VIRTUAL VISIT (OUTPATIENT)
Dept: FAMILY MEDICINE | Facility: OTHER | Age: 36
End: 2021-05-07
Payer: COMMERCIAL

## 2021-05-07 VITALS — HEIGHT: 64 IN | BODY MASS INDEX: 31.07 KG/M2 | WEIGHT: 182 LBS

## 2021-05-07 DIAGNOSIS — E66.09 CLASS 1 OBESITY DUE TO EXCESS CALORIES WITHOUT SERIOUS COMORBIDITY WITH BODY MASS INDEX (BMI) OF 31.0 TO 31.9 IN ADULT: Primary | ICD-10-CM

## 2021-05-07 DIAGNOSIS — E66.811 CLASS 1 OBESITY DUE TO EXCESS CALORIES WITHOUT SERIOUS COMORBIDITY WITH BODY MASS INDEX (BMI) OF 31.0 TO 31.9 IN ADULT: Primary | ICD-10-CM

## 2021-05-07 PROCEDURE — 99213 OFFICE O/P EST LOW 20 MIN: CPT | Mod: 95 | Performed by: STUDENT IN AN ORGANIZED HEALTH CARE EDUCATION/TRAINING PROGRAM

## 2021-05-07 RX ORDER — PHENTERMINE HYDROCHLORIDE 37.5 MG/1
37.5 TABLET ORAL
Qty: 30 TABLET | Refills: 2 | Status: SHIPPED | OUTPATIENT
Start: 2021-05-07 | End: 2021-05-11

## 2021-05-07 ASSESSMENT — ANXIETY QUESTIONNAIRES
GAD7 TOTAL SCORE: 14
7. FEELING AFRAID AS IF SOMETHING AWFUL MIGHT HAPPEN: NOT AT ALL
GAD7 TOTAL SCORE: 14
GAD7 TOTAL SCORE: 14
6. BECOMING EASILY ANNOYED OR IRRITABLE: NEARLY EVERY DAY
3. WORRYING TOO MUCH ABOUT DIFFERENT THINGS: NEARLY EVERY DAY
1. FEELING NERVOUS, ANXIOUS, OR ON EDGE: NEARLY EVERY DAY
2. NOT BEING ABLE TO STOP OR CONTROL WORRYING: NEARLY EVERY DAY
7. FEELING AFRAID AS IF SOMETHING AWFUL MIGHT HAPPEN: NOT AT ALL
5. BEING SO RESTLESS THAT IT IS HARD TO SIT STILL: SEVERAL DAYS
4. TROUBLE RELAXING: SEVERAL DAYS

## 2021-05-07 ASSESSMENT — PATIENT HEALTH QUESTIONNAIRE - PHQ9
SUM OF ALL RESPONSES TO PHQ QUESTIONS 1-9: 6
10. IF YOU CHECKED OFF ANY PROBLEMS, HOW DIFFICULT HAVE THESE PROBLEMS MADE IT FOR YOU TO DO YOUR WORK, TAKE CARE OF THINGS AT HOME, OR GET ALONG WITH OTHER PEOPLE: SOMEWHAT DIFFICULT
SUM OF ALL RESPONSES TO PHQ QUESTIONS 1-9: 6

## 2021-05-07 ASSESSMENT — MIFFLIN-ST. JEOR: SCORE: 1510.55

## 2021-05-08 ASSESSMENT — PATIENT HEALTH QUESTIONNAIRE - PHQ9: SUM OF ALL RESPONSES TO PHQ QUESTIONS 1-9: 6

## 2021-05-08 ASSESSMENT — ANXIETY QUESTIONNAIRES: GAD7 TOTAL SCORE: 14

## 2021-05-10 DIAGNOSIS — E66.09 CLASS 1 OBESITY DUE TO EXCESS CALORIES WITHOUT SERIOUS COMORBIDITY WITH BODY MASS INDEX (BMI) OF 31.0 TO 31.9 IN ADULT: ICD-10-CM

## 2021-05-10 DIAGNOSIS — E66.811 CLASS 1 OBESITY DUE TO EXCESS CALORIES WITHOUT SERIOUS COMORBIDITY WITH BODY MASS INDEX (BMI) OF 31.0 TO 31.9 IN ADULT: ICD-10-CM

## 2021-05-10 NOTE — TELEPHONE ENCOUNTER
Patient is calling to request the Rx for the phentermine be cancelled at [facility] and re-sent to [facility] as its cheaper through them.

## 2021-05-11 RX ORDER — PHENTERMINE HYDROCHLORIDE 37.5 MG/1
37.5 TABLET ORAL
Qty: 30 TABLET | Refills: 2 | Status: SHIPPED | OUTPATIENT
Start: 2021-05-11 | End: 2024-05-13

## 2021-05-12 ENCOUNTER — TELEPHONE (OUTPATIENT)
Dept: FAMILY MEDICINE | Facility: OTHER | Age: 36
End: 2021-05-12

## 2021-05-12 NOTE — TELEPHONE ENCOUNTER
"Form for phentermine reviewed and signed. I couldn't find the \"Denied PA Number\" under the prescriber signature section. It says on the handwritten note from the pharmacy that all sections need to be filled out so wondering if you can call [facility] to find out this number??   Thanks!  Mary Coughlin, CNP    "

## 2021-10-04 ENCOUNTER — OFFICE VISIT (OUTPATIENT)
Dept: FAMILY MEDICINE | Facility: CLINIC | Age: 36
End: 2021-10-04
Payer: COMMERCIAL

## 2021-10-04 ENCOUNTER — TELEPHONE (OUTPATIENT)
Dept: UROLOGY | Facility: CLINIC | Age: 36
End: 2021-10-04

## 2021-10-04 VITALS
OXYGEN SATURATION: 98 % | DIASTOLIC BLOOD PRESSURE: 58 MMHG | BODY MASS INDEX: 28.91 KG/M2 | SYSTOLIC BLOOD PRESSURE: 108 MMHG | RESPIRATION RATE: 18 BRPM | WEIGHT: 168.4 LBS | TEMPERATURE: 97.7 F | HEART RATE: 84 BPM

## 2021-10-04 DIAGNOSIS — R39.15 URINARY URGENCY: ICD-10-CM

## 2021-10-04 DIAGNOSIS — R33.9 INCOMPLETE BLADDER EMPTYING: ICD-10-CM

## 2021-10-04 DIAGNOSIS — N63.0 LUMP OR MASS IN BREAST: Primary | ICD-10-CM

## 2021-10-04 PROCEDURE — 99214 OFFICE O/P EST MOD 30 MIN: CPT | Performed by: STUDENT IN AN ORGANIZED HEALTH CARE EDUCATION/TRAINING PROGRAM

## 2021-10-04 ASSESSMENT — PAIN SCALES - GENERAL: PAINLEVEL: NO PAIN (0)

## 2021-10-04 NOTE — TELEPHONE ENCOUNTER
Laureen Damon, LPN  [facility] Procedure Coordinator 6 minutes ago (9:16 AM)     RB    Hello,     Could you please help patient schedule with next available provider? Dr. Blue and Laureen Napier PA-C would be best, video is ok. Or providers at a diff location if patient would like.

## 2021-10-04 NOTE — PROGRESS NOTES
Assessment & Plan     Lump or mass in breast  There is a lump of the left inner mid breast underlying the area [place] however it is difficult to discern if this is normal breast tissue versus a lump.  We will get further imaging to characterize.  Family history of breast cancer in her mother at age 60.  - MA Diagnostic Digital Right; Future  - US Breast Right Complete 4 Quadrants; Future    Urinary urgency  Incomplete bladder emptying  Having symptoms of urinary urgency and incomplete bladder emptying when she lays down at night causing her to get up multiple times to urinate small amounts despite urinating before she goes to bed.  This has been going on for quite some time.  She has no problems during the day and only after she lays down at night.  I recommend she see urology for further evaluation.  - Adult Urology Referral; Future    No follow-ups on file.    CLAIRE Lundberg CNP  [facility] Bean is a 35 year old who presents for the following health issues     HPI   Chief Complaint   Patient presents with     LUMP ON RIGHT BREAST     Family History   Problem Relation Age of Onset     Hypertension Mother      Diabetes Mother      Cerebrovascular Disease Mother 46        aneurysm     Lipids Mother      Depression Mother      Breast Cancer Mother 60     Hypertension Father      Arthritis Father      Heart Disease Father         MI in late 40's      Lipids Father      Depression Sister      Hypertension Sister      Hypertension Brother      She would also like to discuss urinary urgency and sensation that she is not emptying her bladder completely after she lays down at night.  She will urinate before going to bed and feel like she has emptied a lot of urine.  As soon as she lays down she feels like her bladder is full and that she needs to urinate but will only urinate a little bit.  She will then feel urgency again after laying down to urinate and will continue to  repeat that pattern every night.        Review of Systems   Constitutional, HEENT, cardiovascular, pulmonary, gi and gu systems are negative, except as otherwise noted.      Objective    /58 (BP Location: Left arm, Patient Position: Sitting)   Pulse 84   Temp 97.7  F (36.5  C) (Temporal)   Resp 18   Wt 76.4 kg (168 lb 6.4 oz)   LMP 09/26/2021 (Approximate)   SpO2 98%   BMI 28.91 kg/m    Body mass index is 28.91 kg/m .  Physical Exam   GENERAL: healthy, alert and no distress  BREAST: oblong horizontal lump that is mobile at 3 o'clock underlying areola  PSYCH: mentation appears normal, affect normal/bright

## 2021-10-04 NOTE — LETTER
October 18, 2021      Geneva Cooley  [address]   [place] [address]        Dear Geneva Cooley,    In order to ensure that we are providing the best quality care, we would like to remind you that we received a referral to urology from your provider.    I you are still interested in scheduling your consult, please call the number below.  Please let us know if you have any questions and we would be happy to help.     Thank you for trusting us with your care.    Sincerely,     [facility]  [phone]

## 2021-10-04 NOTE — TELEPHONE ENCOUNTER
[facility] Call Center    Phone Message    May a detailed message be left on voicemail: yes     Reason for Call: Appointment Intake    Referring Provider Name: Jody Coughlin APRN CNP    Diagnosis and/or Symptoms: Urinary urgency [R39.15]  Incomplete bladder emptying    Action Taken: Message routed to:  Adult Clinics: Urology p [id number]    Travel Screening: Not Applicable

## 2021-10-04 NOTE — PATIENT INSTRUCTIONS
Call to schedule mammogram and ultrasound [phone].    Urology referral - You will receive a call to set up an appointment

## 2021-10-08 ENCOUNTER — HOSPITAL ENCOUNTER (OUTPATIENT)
Dept: MAMMOGRAPHY | Facility: CLINIC | Age: 36
End: 2021-10-08
Attending: STUDENT IN AN ORGANIZED HEALTH CARE EDUCATION/TRAINING PROGRAM
Payer: COMMERCIAL

## 2021-10-08 ENCOUNTER — HOSPITAL ENCOUNTER (OUTPATIENT)
Dept: ULTRASOUND IMAGING | Facility: CLINIC | Age: 36
End: 2021-10-08
Attending: STUDENT IN AN ORGANIZED HEALTH CARE EDUCATION/TRAINING PROGRAM
Payer: COMMERCIAL

## 2021-10-08 DIAGNOSIS — N63.0 LUMP OR MASS IN BREAST: ICD-10-CM

## 2021-10-08 PROCEDURE — 77062 BREAST TOMOSYNTHESIS BI: CPT

## 2021-10-08 PROCEDURE — 76642 ULTRASOUND BREAST LIMITED: CPT | Mod: RT

## 2021-10-11 NOTE — TELEPHONE ENCOUNTER
2nd attempt to schedule as noted below. [employer] message sent to patient to return call and schedule.    Karin Darden  Surgical Specialties Procedure   [facility] [place]  10/11/2021 8:53 AM

## 2021-10-18 NOTE — TELEPHONE ENCOUNTER
3rd attempt. Patient has not called to schedule.  Appointment reminder letter sent to patient.      Karin Darden  Surgical Specialties Procedure   [employer] [place]  10/18/2021 7:52 AM

## 2021-10-23 ENCOUNTER — HEALTH MAINTENANCE LETTER (OUTPATIENT)
Age: 36
End: 2021-10-23

## 2021-12-01 ENCOUNTER — E-VISIT (OUTPATIENT)
Dept: URGENT CARE | Facility: URGENT CARE | Age: 36
End: 2021-12-01
Payer: COMMERCIAL

## 2021-12-01 DIAGNOSIS — J02.9 SORE THROAT: ICD-10-CM

## 2021-12-01 DIAGNOSIS — Z20.822 SUSPECTED COVID-19 VIRUS INFECTION: ICD-10-CM

## 2021-12-01 PROCEDURE — 99421 OL DIG E/M SVC 5-10 MIN: CPT | Performed by: STUDENT IN AN ORGANIZED HEALTH CARE EDUCATION/TRAINING PROGRAM

## 2021-12-01 NOTE — LETTER
[facility] URGENT CARE [address] [address]  Phone: [phone]    December 1, 2021        Geneva Cooley  [address] [place]   [facility] [facility] [address]          To whom it may concern:    RE: Geneva Cooley    Patient had e-visit today at our clinic for acute illness symptoms. I recommend testing for strep and Covid. Please excuse any absences from work/school due to current illness. May return to work if Covid test is negative and she is fever free and feeling well. If Covid test is positive, return to work plan will be determined at that time.    Please contact me for questions or concerns.      Sincerely,        CLAIRE Lundberg CNP

## 2021-12-01 NOTE — PATIENT INSTRUCTIONS
Dear Geneva Cooley,    Your symptoms show that you may have coronavirus (COVID-19). This illness can cause fever, cough and trouble breathing. Many people get a mild case and get better on their own. Some people can get very sick.    Because you also reported sore throat I would like to also test you for Strep Throat to determine if we need to treat you for that as well.    What should I do?  We would like to test you for Covid-19 virus and Strep Throat. I have placed orders for these tests.   To schedule: go to your [employer] home page and scroll down to the section that says  You have an appointment that needs to be scheduled  and click the large green button that says  Schedule Now  and follow the steps to find the next available openings. It is important that when you are asked what the reason for your appointment is that you mention you need BOTH Covid and Strep tests.    If you are unable to complete these [employer] scheduling steps, please call [phone] to schedule your testing.     Return to work/school/ guidance:   Please let your workplace manager and staffing office know when your quarantine ends     We can t give you an exact date as it depends on the above. You can calculate this on your own or work with your manager/staffing office to calculate this. (For example if you were exposed on 10/4, you would have to quarantine for 14 full days. That would be through 10/18. You could return on 10/19.)      If you receive a positive COVID-19 test result, follow the guidance of the those who are giving you the results. Usually the return to work is 10 (or in some cases 20 days from symptom onset.) If you work at [employer], you must also be cleared by Employee Occupational Health and Safety to return to work.        If you receive a negative COVID-19 test result and did not have a high risk exposure to someone with a known positive COVID-19 test, you can return to work once you're free of fever  for 24 hours without fever-reducing medication and your symptoms are improving or resolved.      If you receive a negative COVID-19 test and If you had a high risk exposure to someone who has tested positive for COVID-19 then you can return to work 14 days after your last contact with the positive individual    Note: If you have ongoing exposure to the covid positive person, this quarantine period may be more than 14 days. (For example, if you are continued to be exposed to your child who tested positive and cannot isolate from them, then the quarantine of 7-14 days can't start until your child is no longer contagious. This is typically 10 days from onset of the child's symptoms. So the total duration may be 17-24 days in this case.)    Sign up for [employer].   We know it's scary to hear that you might have COVID-19. We want to track your symptoms to make sure you're okay over the next 2 weeks. Please look for an email from [employer]--this is a free, online program that we'll use to keep in touch. To sign up, follow the link in the email you will receive. Learn more at [email or web address]    How can I take care of myself?    Get lots of rest. Drink extra fluids (unless a doctor has told you not to)    Take Tylenol (acetaminophen) or ibuprofen for fever or pain. If you have liver or kidney problems, ask your family doctor if it's okay to take Tylenol o ibuprofen    If you have other health problems (like cancer, heart failure, an organ transplant or severe kidney disease): Call your specialty clinic if you don't feel better in the next 2 days.    Know when to call 911. Emergency warning signs include:  o Trouble breathing or shortness of breath  o Pain or pressure in the chest that doesn't go away  o Feeling confused like you haven't felt before, or not being able to wake up  o Bluish-colored lips or face    Where can I get more information?  [facility] - About COVID-19:    [email or web address]    CDC - What to Do If You're Sick:   www.cdc.gov/coronavirus/2019-ncov/about/steps-when-sick.html

## 2022-02-12 ENCOUNTER — HEALTH MAINTENANCE LETTER (OUTPATIENT)
Age: 37
End: 2022-02-12

## 2022-08-23 ENCOUNTER — ALLIED HEALTH/NURSE VISIT (OUTPATIENT)
Dept: FAMILY MEDICINE | Facility: OTHER | Age: 37
End: 2022-08-23
Payer: COMMERCIAL

## 2022-08-23 DIAGNOSIS — Z23 COVID-19 VACCINE ADMINISTERED: Primary | ICD-10-CM

## 2022-08-23 PROCEDURE — 0051A COVID-19,PF,PFIZER (12+ YRS): CPT

## 2022-08-23 PROCEDURE — 91305 COVID-19,PF,PFIZER (12+ YRS): CPT

## 2022-08-23 PROCEDURE — 99207 PR NO CHARGE NURSE ONLY: CPT

## 2022-09-13 ENCOUNTER — IMMUNIZATION (OUTPATIENT)
Dept: FAMILY MEDICINE | Facility: OTHER | Age: 37
End: 2022-09-13
Attending: FAMILY MEDICINE
Payer: COMMERCIAL

## 2022-09-13 PROCEDURE — 0052A COVID-19,PF,PFIZER (12+ YRS): CPT

## 2022-09-13 PROCEDURE — 91305 COVID-19,PF,PFIZER (12+ YRS): CPT

## 2022-10-09 ENCOUNTER — HEALTH MAINTENANCE LETTER (OUTPATIENT)
Age: 37
End: 2022-10-09

## 2023-02-18 ENCOUNTER — HEALTH MAINTENANCE LETTER (OUTPATIENT)
Age: 38
End: 2023-02-18

## 2023-12-19 NOTE — MR AVS SNAPSHOT
After Visit Summary   11/17/2017    Geneva Cooley    MRN: [id number]           Patient Information     Date Of Birth          1985        Visit Information        Provider Department      11/17/2017 10:00 AM Jody Coughlin APRN CNP [facility]        Today's Diagnoses     Bromhidrosis    -  1    Morbid obesity due to excess calories (H)        Encounter for surveillance of vaginal ring hormonal contraceptive device          Care Instructions    Clindamycin lotion twice daily to underarms.    Phentermine with one refill.     Jody Coughlin NP-C            Follow-ups after your visit        Your next 10 appointments already scheduled     Nov 29, 2017 11:00 AM CST   Weight Loss Visit with [facility] Diet [address], RD   [facility] ([facility])    [address] [place] [id number] [phone]              Who to contact     If you have questions or need follow up information about today's clinic visit or your schedule please contact [facility] directly at [phone].  Normal or non-critical lab and imaging results will be communicated to you by [employer], letter or phone within 4 business days after the clinic has received the results. If you do not hear from us within 7 days, please contact the clinic through [employer] or phone. If you have a critical or abnormal lab result, we will notify you by phone as soon as possible.  Submit refill requests through [employer] or call your pharmacy and they will forward the refill request to us. Please allow 3 business days for your refill to be completed.          Additional Information About Your Visit        [employer] Information     [employer] gives you secure access to your electronic health record. If you see a primary care provider, you can also send messages to your care team and make appointments. If you have questions, please call your  Call Center TCM Note      Flowsheet Row Responses   [facility] patient discharged from? [place]   Does the patient have one of the following disease processes/diagnoses(primary or secondary)? Other   TCM attempt successful? No   Unsuccessful attempts Attempt 1   Call Status Left message            Asif Guzman RN    12/19/2023, 11:12 EST         "primary care clinic.  If you do not have a primary care provider, please call [phone] and they will assist you.        Care EveryWhere ID     This is your Care EveryWhere ID. This could be used by other organizations to access your [place] medical records  [id number]        Your Vitals Were     Pulse Temperature Respirations Height BMI (Body Mass Index)       84 98.3  F (36.8  C) (Temporal) 16 5' 5\" (1.651 m) 39.69 kg/m2        Blood Pressure from Last 3 Encounters:   11/17/17 118/80   09/27/17 116/80   08/28/17 110/78    Weight from Last 3 Encounters:   11/17/17 238 lb 8 oz (108.2 kg)   09/27/17 250 lb 6.4 oz (113.6 kg)   08/28/17 252 lb (114.3 kg)              Today, you had the following     No orders found for display         Today's Medication Changes          These changes are accurate as of: 11/17/17 10:51 AM.  If you have any questions, ask your nurse or doctor.               Start taking these medicines.        Dose/Directions    clindamycin 1 % lotion   Commonly known as:  CLINDAMAX   Used for:  Bromhidrosis   Started by:  Jody Coughlin APRN CNP        Apply topically 2 times daily   Quantity:  60 mL   Refills:  11            Where to get your medicines      These medications were sent to [facility] [address] [place] [address] [address] [address]     Phone:  [phone]     clindamycin 1 % lotion         Some of these will need a paper prescription and others can be bought over the counter.  Ask your nurse if you have questions.     Bring a paper prescription for each of these medications     phentermine 37.5 MG tablet                Primary Care Provider Office Phone # Fax #    CLAIRE Luna [phone] [address] [address] [facility] [address]        Equal Access to Services     RUBEN CYR AH: Hadii barbi patterson hadasho Soomaali, waaxda luqadaha, qaybta kaalmada adeegyada, oscar cárdenas " ah. So [facility] [phone].    ATENCIÓN: Si heraclio griffin, tiene a tyler disposición servicios gratuitos de asistencia lingüística. Ana Luisa saunders [phone].    We comply with applicable federal civil rights laws and [place] laws. We do not discriminate on the basis of race, color, national origin, age, disability, sex, sexual orientation, or gender identity.            Thank you!     Thank you for choosing [facility]  for your care. Our goal is always to provide you with excellent care. Hearing back from our patients is one way we can continue to improve our services. Please take a few minutes to complete the written survey that you may receive in the mail after your visit with us. Thank you!             Your Updated Medication List - Protect others around you: Learn how to safely use, store and throw away your medicines at www.disposemymeds.org.          This list is accurate as of: 11/17/17 10:51 AM.  Always use your most recent med list.                   Brand Name Dispense Instructions for use Diagnosis    buPROPion 150 MG 12 hr tablet    WELLBUTRIN SR    60 tablet    Take 1 tablet once daily and increase to 1 tablet twice daily after 4 to 7 days    LILIA (generalized anxiety disorder)       clindamycin 1 % lotion    CLINDAMAX    60 mL    Apply topically 2 times daily    Bromhidrosis       cyclobenzaprine 10 MG tablet    FLEXERIL    30 tablet    Take 1 tablet (10 mg) by mouth daily as needed for muscle spasms    Pain in joint involving ankle and foot, unspecified laterality       etonogestrel-ethinyl estradiol 0.12-0.015 MG/24HR vaginal ring    NUVARING    1 each    Place 1 ring every 21 days then remove for 1 week. Needs appointment for further refills    Encounter for surveillance of contraceptives       meloxicam 15 MG tablet    MOBIC    30 tablet    Take 1 tablet (15 mg) by mouth daily    Chronic bilateral low back pain without sciatica       MOTRIN IB PO      Take 600 mg by mouth        naproxen  500 MG tablet    NAPROSYN    60 tablet    Take 1 tablet (500 mg) by mouth 2 times daily (with meals) As needed    LBP (low back pain)       phentermine 37.5 MG tablet    ADIPEX-P    30 tablet    Take 1 tablet (37.5 mg) by mouth every morning (before breakfast)    Morbid obesity due to excess calories (H)

## 2024-03-16 ENCOUNTER — HEALTH MAINTENANCE LETTER (OUTPATIENT)
Age: 39
End: 2024-03-16

## 2024-05-13 ENCOUNTER — OFFICE VISIT (OUTPATIENT)
Dept: FAMILY MEDICINE | Facility: OTHER | Age: 39
End: 2024-05-13
Payer: COMMERCIAL

## 2024-05-13 VITALS
RESPIRATION RATE: 16 BRPM | HEART RATE: 63 BPM | OXYGEN SATURATION: 99 % | SYSTOLIC BLOOD PRESSURE: 116 MMHG | BODY MASS INDEX: 29.99 KG/M2 | TEMPERATURE: 98.8 F | DIASTOLIC BLOOD PRESSURE: 64 MMHG | WEIGHT: 180 LBS | HEIGHT: 65 IN

## 2024-05-13 DIAGNOSIS — F41.9 ANXIETY: ICD-10-CM

## 2024-05-13 DIAGNOSIS — L60.9 NAIL ABNORMALITIES: ICD-10-CM

## 2024-05-13 DIAGNOSIS — E66.09 CLASS 1 OBESITY DUE TO EXCESS CALORIES WITHOUT SERIOUS COMORBIDITY WITH BODY MASS INDEX (BMI) OF 30.0 TO 30.9 IN ADULT: ICD-10-CM

## 2024-05-13 DIAGNOSIS — L60.0 INGROWN NAIL: ICD-10-CM

## 2024-05-13 DIAGNOSIS — Z00.00 ROUTINE GENERAL MEDICAL EXAMINATION AT A HEALTH CARE FACILITY: Primary | ICD-10-CM

## 2024-05-13 DIAGNOSIS — E66.811 CLASS 1 OBESITY DUE TO EXCESS CALORIES WITHOUT SERIOUS COMORBIDITY WITH BODY MASS INDEX (BMI) OF 30.0 TO 30.9 IN ADULT: ICD-10-CM

## 2024-05-13 DIAGNOSIS — L72.0 EPIDERMAL CYST: ICD-10-CM

## 2024-05-13 LAB
ANION GAP SERPL CALCULATED.3IONS-SCNC: 8 MMOL/L (ref 7–15)
BUN SERPL-MCNC: 10.5 MG/DL (ref 6–20)
CALCIUM SERPL-MCNC: 8.9 MG/DL (ref 8.6–10)
CHLORIDE SERPL-SCNC: 106 MMOL/L (ref 98–107)
CHOLEST SERPL-MCNC: 187 MG/DL
CREAT SERPL-MCNC: 0.66 MG/DL (ref 0.51–0.95)
DEPRECATED HCO3 PLAS-SCNC: 24 MMOL/L (ref 22–29)
EGFRCR SERPLBLD CKD-EPI 2021: >90 ML/MIN/1.73M2
ERYTHROCYTE [DISTWIDTH] IN BLOOD BY AUTOMATED COUNT: 13.8 % (ref 10–15)
FASTING STATUS PATIENT QL REPORTED: NO
FASTING STATUS PATIENT QL REPORTED: NO
FERRITIN SERPL-MCNC: 39 NG/ML (ref 6–175)
GLUCOSE SERPL-MCNC: 84 MG/DL (ref 70–99)
HCT VFR BLD AUTO: 40.3 % (ref 35–47)
HDLC SERPL-MCNC: 69 MG/DL
HGB BLD-MCNC: 12.9 G/DL (ref 11.7–15.7)
LDLC SERPL CALC-MCNC: 106 MG/DL
MCH RBC QN AUTO: 28 PG (ref 26.5–33)
MCHC RBC AUTO-ENTMCNC: 32 G/DL (ref 31.5–36.5)
MCV RBC AUTO: 87 FL (ref 78–100)
NONHDLC SERPL-MCNC: 118 MG/DL
PLATELET # BLD AUTO: 225 10E3/UL (ref 150–450)
POTASSIUM SERPL-SCNC: 4.6 MMOL/L (ref 3.4–5.3)
RBC # BLD AUTO: 4.61 10E6/UL (ref 3.8–5.2)
SODIUM SERPL-SCNC: 138 MMOL/L (ref 135–145)
TRIGL SERPL-MCNC: 62 MG/DL
TSH SERPL DL<=0.005 MIU/L-ACNC: 3.33 UIU/ML (ref 0.3–4.2)
WBC # BLD AUTO: 5.7 10E3/UL (ref 4–11)

## 2024-05-13 PROCEDURE — 82728 ASSAY OF FERRITIN: CPT | Performed by: PHYSICIAN ASSISTANT

## 2024-05-13 PROCEDURE — 90715 TDAP VACCINE 7 YRS/> IM: CPT | Performed by: PHYSICIAN ASSISTANT

## 2024-05-13 PROCEDURE — 36415 COLL VENOUS BLD VENIPUNCTURE: CPT | Performed by: PHYSICIAN ASSISTANT

## 2024-05-13 PROCEDURE — 99213 OFFICE O/P EST LOW 20 MIN: CPT | Mod: 25 | Performed by: PHYSICIAN ASSISTANT

## 2024-05-13 PROCEDURE — 80048 BASIC METABOLIC PNL TOTAL CA: CPT | Performed by: PHYSICIAN ASSISTANT

## 2024-05-13 PROCEDURE — 80061 LIPID PANEL: CPT | Performed by: PHYSICIAN ASSISTANT

## 2024-05-13 PROCEDURE — 84443 ASSAY THYROID STIM HORMONE: CPT | Performed by: PHYSICIAN ASSISTANT

## 2024-05-13 PROCEDURE — 85027 COMPLETE CBC AUTOMATED: CPT | Performed by: PHYSICIAN ASSISTANT

## 2024-05-13 PROCEDURE — 99395 PREV VISIT EST AGE 18-39: CPT | Mod: 25 | Performed by: PHYSICIAN ASSISTANT

## 2024-05-13 PROCEDURE — 90471 IMMUNIZATION ADMIN: CPT | Performed by: PHYSICIAN ASSISTANT

## 2024-05-13 PROCEDURE — 96127 BRIEF EMOTIONAL/BEHAV ASSMT: CPT | Performed by: PHYSICIAN ASSISTANT

## 2024-05-13 RX ORDER — HYDROXYZINE HYDROCHLORIDE 10 MG/1
10 TABLET, FILM COATED ORAL 3 TIMES DAILY PRN
Qty: 90 TABLET | Refills: 0 | Status: SHIPPED | OUTPATIENT
Start: 2024-05-13

## 2024-05-13 SDOH — HEALTH STABILITY: PHYSICAL HEALTH: ON AVERAGE, HOW MANY DAYS PER WEEK DO YOU ENGAGE IN MODERATE TO STRENUOUS EXERCISE (LIKE A BRISK WALK)?: 3 DAYS

## 2024-05-13 ASSESSMENT — ANXIETY QUESTIONNAIRES
8. IF YOU CHECKED OFF ANY PROBLEMS, HOW DIFFICULT HAVE THESE MADE IT FOR YOU TO DO YOUR WORK, TAKE CARE OF THINGS AT HOME, OR GET ALONG WITH OTHER PEOPLE?: VERY DIFFICULT
4. TROUBLE RELAXING: NOT AT ALL
1. FEELING NERVOUS, ANXIOUS, OR ON EDGE: NOT AT ALL
GAD7 TOTAL SCORE: 0
5. BEING SO RESTLESS THAT IT IS HARD TO SIT STILL: NOT AT ALL
2. NOT BEING ABLE TO STOP OR CONTROL WORRYING: NOT AT ALL
GAD7 TOTAL SCORE: 0
7. FEELING AFRAID AS IF SOMETHING AWFUL MIGHT HAPPEN: NOT AT ALL
IF YOU CHECKED OFF ANY PROBLEMS ON THIS QUESTIONNAIRE, HOW DIFFICULT HAVE THESE PROBLEMS MADE IT FOR YOU TO DO YOUR WORK, TAKE CARE OF THINGS AT HOME, OR GET ALONG WITH OTHER PEOPLE: VERY DIFFICULT
6. BECOMING EASILY ANNOYED OR IRRITABLE: NOT AT ALL
7. FEELING AFRAID AS IF SOMETHING AWFUL MIGHT HAPPEN: NOT AT ALL
GAD7 TOTAL SCORE: 0
3. WORRYING TOO MUCH ABOUT DIFFERENT THINGS: NOT AT ALL

## 2024-05-13 ASSESSMENT — PATIENT HEALTH QUESTIONNAIRE - PHQ9
SUM OF ALL RESPONSES TO PHQ QUESTIONS 1-9: 4
SUM OF ALL RESPONSES TO PHQ QUESTIONS 1-9: 4
10. IF YOU CHECKED OFF ANY PROBLEMS, HOW DIFFICULT HAVE THESE PROBLEMS MADE IT FOR YOU TO DO YOUR WORK, TAKE CARE OF THINGS AT HOME, OR GET ALONG WITH OTHER PEOPLE: VERY DIFFICULT

## 2024-05-13 ASSESSMENT — SOCIAL DETERMINANTS OF HEALTH (SDOH): HOW OFTEN DO YOU GET TOGETHER WITH FRIENDS OR RELATIVES?: NEVER

## 2024-05-13 NOTE — PATIENT INSTRUCTIONS
"Preventive Care Advice   This is general advice we often give to help people stay healthy. Your care team may have specific advice just for you. Please talk to your care team about your own preventive care needs.  Lifestyle  Exercise at least 150 minutes each week (30 minutes a day, 5 days a week).  Do muscle strengthening activities 2 days a week. These help control your weight and prevent disease.  No smoking.  Wear sunscreen to prevent skin cancer.  Have your home tested for radon every 2 to 5 years. Radon is a colorless, odorless gas that can harm your lungs. To learn more, go to www.health.[facility].[place]. and search for \"Radon in Homes.\"  Keep guns unloaded and locked up in a safe place like a safe or gun vault, or, use a gun lock and hide the keys. Always lock away bullets separately. To learn more, visit [employer].[place].gov and search for \"safe gun storage.\"  Nutrition  Eat 5 or more servings of fruits and vegetables each day.  Try wheat bread, brown rice and whole grain pasta (instead of white bread, rice, and pasta).  Get enough calcium and vitamin D. Check the label on foods and aim for 100% of the RDA (recommended daily allowance).  Regular exams  Have a dental exam and cleaning every 6 months.  See your health care team every year to talk about:  Any changes in your health.  Any medicines your care team has prescribed.  Preventive care, family planning, and ways to prevent chronic diseases.  Shots (vaccines)   HPV shots (up to age 26), if you've never had them before.  Hepatitis B shots (up to age 59), if you've never had them before.  COVID-19 shot: Get this shot when it's due.  Flu shot: Get a flu shot every year.  Tetanus shot: Get a tetanus shot every 10 years.  Pneumococcal, hepatitis A, and RSV shots: Ask your care team if you need these based on your risk.  Shingles shot (for age 50 and up).  General health tests  Diabetes screening:  Starting at age 35, Get screened for diabetes at least every 3 years.  If " you are younger than age 35, ask your care team if you should be screened for diabetes.  Cholesterol test: At age 39, start having a cholesterol test every 5 years, or more often if advised.  Bone density scan (DEXA): At age 50, ask your care team if you should have this scan for osteoporosis (brittle bones).  Hepatitis C: Get tested at least once in your life.  Abdominal aortic aneurysm screening: Talk to your doctor about having this screening if you:  Have ever smoked; and  Are biologically male; and  Are between the ages of 65 and 75.  STIs (sexually transmitted infections)  Before age 24: Ask your care team if you should be screened for STIs.  After age 24: Get screened for STIs if you're at risk. You are at risk for STIs (including HIV) if:  You are sexually active with more than one person.  You don't use condoms every time.  You or a partner was diagnosed with a sexually transmitted infection.  If you are at risk for HIV, ask about PrEP medicine to prevent HIV.  Get tested for HIV at least once in your life, whether you are at risk for HIV or not.  Cancer screening tests  Cervical cancer screening: If you have a cervix, begin getting regular cervical cancer screening tests at age 21. Most people who have regular screenings with normal results can stop after age 65. Talk about this with your provider.  Breast cancer scan (mammogram): If you've ever had breasts, begin having regular mammograms starting at age 40. This is a scan to check for breast cancer.  Colon cancer screening: It is important to start screening for colon cancer at age 45.  Have a colonoscopy test every 10 years (or more often if you're at risk) Or, ask your provider about stool tests like a FIT test every year or Cologuard test every 3 years.  To learn more about your testing options, visit: www.[employer]/830864.pdf.  For help making a decision, visit: wilber/eq34768.  Prostate cancer screening test: If you have a prostate and are age 55  to 69, ask your provider if you would benefit from a yearly prostate cancer screening test.  Lung cancer screening: If you are a current or former smoker age 50 to 80, ask your care team if ongoing lung cancer screenings are right for you.  For informational purposes only. Not to replace the advice of your health care provider. Copyright   2023 [employer]. All rights reserved. Clinically reviewed by the [facility] Transitions Program. [employer] 729811 - REV 04/24.    Relationships for Good Health  Relationships are important for our health and happiness. Social isolation, loneliness and lack of support are bad for your health. Studies show that loneliness can harm health and limit your life span as much as high blood pressure and smoking.   Take some time to reflect on your relationships. Then answer these questions:  Are there people in your life that cause you stress or drain your energy? What can you do to set limits?  ________________________________________________________________________________________________________________________________________________________________________________________________________________________________________________________________________________________________________________________________________________  Who do you enjoy spending time with? Who can you go to for support?  ________________________________________________________________________________________________________________________________________________________________________________________________________________________________________________________________________________________________________________________________________________  What can you do to improve your relationships with  others?  __________________________________________________________________________________________________________________________________________________________________________________________________________________  ______________________________________________________________________________________________________________________________  What do you like most about your relationships with others?  ________________________________________________________________________________________________________________________________________________________________________________________________________________________________________________________________________________________________________________________________________________  My goal: ______________________________________________________________________  I will ______________________________________________________________________________________________________________________________________________________________________________________________    For informational purposes only. Not to replace the advice of your health care provider. Copyright   2018 [facility]. All rights reserved. Clinically reviewed by B[facility]ric Health  Team. SMARTworks 021458 - Rev 04/21.    Learning About Stress  What is stress?     Stress is your body's response to a hard situation. Your body can have a physical, emotional, or mental response. Stress is a fact of life for most people, and it affects everyone differently. What causes stress for you may not be stressful for someone else.  A lot of things can cause stress. You may feel stress when you go on a job interview, take a test, or run a race. This kind of short-term stress is normal and even useful. It can help you if you need to work hard or react quickly. For example, stress can help you finish an important job on time.  Long-term stress is caused by ongoing stressful situations or events. Examples  of long-term stress include long-term health problems, ongoing problems at work, or conflicts in your family. Long-term stress can harm your health.  How does stress affect your health?  When you are stressed, your body responds as though you are in danger. It makes hormones that speed up your heart, make you breathe faster, and give you a burst of energy. This is called the fight-or-flight stress response. If the stress is over quickly, your body goes back to normal and no harm is done.  But if stress happens too often or lasts too long, it can have bad effects. Long-term stress can make you more likely to get sick, and it can make symptoms of some diseases worse. If you tense up when you are stressed, you may develop neck, shoulder, or low back pain. Stress is linked to high blood pressure and heart disease.  Stress also harms your emotional health. It can make you brooke, tense, or depressed. Your relationships may suffer, and you may not do well at work or school.  What can you do to manage stress?  You can try these things to help manage stress:   Do something active. Exercise or activity can help reduce stress. Walking is a great way to get started. Even everyday activities such as housecleaning or yard work can help.  Try yoga or leslye chi. These techniques combine exercise and meditation. You may need some training at first to learn them.  Do something you enjoy. For example, listen to music or go to a movie. Practice your hobby or do volunteer work.  Meditate. This can help you relax, because you are not worrying about what happened before or what may happen in the future.  Do guided imagery. Imagine yourself in any setting that helps you feel calm. You can use online videos, books, or a teacher to guide you.  Do breathing exercises. For example:  From a standing position, bend forward from the waist with your knees slightly bent. Let your arms dangle close to the floor.  Breathe in slowly and deeply as you  "return to a standing position. Roll up slowly and lift your head last.  Hold your breath for just a few seconds in the standing position.  Breathe out slowly and bend forward from the waist.  Let your feelings out. Talk, laugh, cry, and express anger when you need to. Talking with supportive friends or family, a counselor, or a nancy leader about your feelings is a healthy way to relieve stress. Avoid discussing your feelings with people who make you feel worse.  Write. It may help to write about things that are bothering you. This helps you find out how much stress you feel and what is causing it. When you know this, you can find better ways to cope.  What can you do to prevent stress?  You might try some of these things to help prevent stress:  Manage your time. This helps you find time to do the things you want and need to do.  Get enough sleep. Your body recovers from the stresses of the day while you are sleeping.  Get support. Your family, friends, and community can make a difference in how you experience stress.  Limit your news feed. Avoid or limit time on social media or news that may make you feel stressed.  Do something active. Exercise or activity can help reduce stress. Walking is a great way to get started.  Where can you learn more?  Go to https://www.[employer].net/patiented  Enter N032 in the search box to learn more about \"Learning About Stress.\"  Current as of: October 24, 2023               Content Version: 14.0    1646-6986 [employer].   Care instructions adapted under license by your healthcare professional. If you have questions about a medical condition or this instruction, always ask your healthcare professional. [employer] disclaims any warranty or liability for your use of this information.      "

## 2024-05-13 NOTE — PROGRESS NOTES
Preventive Care Visit  [facility]  Dominik Carrizales PA-C, Family Medicine  May 13, 2024    Assessment & Plan     Routine general medical examination at a health care facility  Patient is a pleasant 38 year old female who presents today for annual checkup. She informs me that she had been working with telehealth medicine in the past as her previous provider would cover this service. The patient has since changed jobs. 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 She also tries to follow a healthy diet as well. Stress is increased, at times, which she attributes to having a 14 year old daughter. Reviewed healthy lifestyle recommendations with the patient. Reviewed health maintenance and updated per the patient's preferences.  - Basic metabolic panel  (Ca, Cl, CO2, Creat, Gluc, K, Na, BUN); Future  - CBC with platelets; Future  - Lipid Profile (Chol, Trig, HDL, LDL calc); Future  - TSH with free T4 reflex; Future  - Basic metabolic panel  (Ca, Cl, CO2, Creat, Gluc, K, Na, BUN)  - CBC with platelets  - Lipid Profile (Chol, Trig, HDL, LDL calc)  - TSH with free T4 reflex    Epidermal cyst  Patient has a small cyst along the left upper/mid back. Central punctum noted, nontender. Discussed options for monitoring given the small size vs removal if it begins to enlarge.   - Adult General Surg Referral; Future    Anxiety  Patient reports that she has had 2 episodes of increased heart rate over this past year. 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 She recalls she had to walk through a crowd of people. Symptoms improved, over 10-15 minutes, with removing herself to a more quiet location. Similar episode occurred in 12/2022 at the [facility].  Symptoms also improved over 10-15 minutes in a quiet space. We discussed options for evaluating the heart including ziopatch, stress testing or EKG. Patient denies symptoms at this time and it is not clear as to whether they will occur again. I also discussed possibility of anxiety contributing to this given lack of associated symptoms and normal vitals. I did agree to provide patient with hydroxyzine to use as needed. If symptoms begin to recur she can reach out for ziopatch to be sent out/applied.   - hydrOXYzine HCl (ATARAX) 10 MG tablet; Take 1 tablet (10 mg) by mouth 3 times daily as needed for anxiety    Class 1 obesity due to excess calories without serious comorbidity with body mass index (BMI) of 30.0 to 30.9 in adult  Patient had been started on wegovy by her telehealth provider, but the medication was stopped when the patient's insurance changed. She would like to restart this medication. I will have her update labs today and will send out prescription at 0.25mg dose weekly. The patient will follow up in 2-3 weeks.   - Semaglutide-Weight Management (WEGOVY) 0.25 MG/0.5ML pen; Inject 0.25 mg Subcutaneous once a week    Ingrown nail  Patient's toenails were damaged by blister formation in the past. Since the recovery of the blisters the nails have begun to grow back along the nail beds. The patient has been trying to manage these with trimming and good hygiene. Unfortunately, this has not been overly effective. The patient would like to meet with podiatry to discuss resection procedure.   - Orthopedic  Referral; Future    Nail abnormalities  With the aforementioned trauma to the toenails the patient has noticed that they will only grow back 1/2 way and then seem to lift off of the nail bed. Both nails of the big toes are trimmed back today. Neither nail appeared thickened or discolored to suggest fungal infection. Unclear as to whether an alternative process is contributing to the nail abnormality.  "  - Ferritin; Future  - Orthopedic  Referral; Future  - Ferritin    Patient has been advised of split billing requirements and indicates understanding: Yes    BMI  Estimated body mass index is 30.19 kg/m  as calculated from the following:    Height as of this encounter: 1.645 m (5' 4.75\").    Weight as of this encounter: 81.6 kg (180 lb).   Weight management plan: Discussed healthy diet and exercise guidelines    Counseling  Appropriate preventive services were discussed with this patient, including applicable screening as appropriate for fall prevention, nutrition, physical activity, Tobacco-use cessation, weight loss and cognition.  Checklist reviewing preventive services available has been given to the patient.  Reviewed patient's diet, addressing concerns and/or questions.   She is at risk for lack of exercise and has been provided with information to increase physical activity for the benefit of her well-being.   Patient is at risk for social isolation and has been provided with information about the benefit of social connection.     Princess Bean is a 38 year old, presenting for the following:  Physical        5/13/2024     8:44 AM   Additional Questions   Roomed by Lucie PEREZ   Accompanied by self        Health Care Directive  Patient does not have a Health Care Directive or Living Will: Discussed advance care planning with patient; however, patient declined at this time.    HPI    Concern - anxiety or maybe chest flutters (can physically tell it isn't beating in the same rhythm)  Onset: within the last 3 years randomly  Description: heart beats irregularly , the first time it happened her  had a trick to help her because he has heart issues so he had her try breathing through a straw which helped calmed her down so she isn't sure if it is chest related or just anxiety related. Randomly started going out of wack, happens randomly. Christmas time, then 4th of July time- had to walk through a " crowd of people so not sure if it was walking through the crowd or carrying the heavy items (has happened a total of 3 times in the last 3 years)  Intensity: mild (first was severe) the other 2 mild  Progression of Symptoms:  same and intermittent  Accompanying Signs & Symptoms: chest flutters , no other symptoms  Previous history of similar problem: no  Precipitating factors:        Worsened by: no  Alleviating factors:        Improved by: breathing  Therapies tried and outcome: breathing exercises    First time last for about 15minutes  Had been walking through a crowd with inventory/stock   Own a carnival   ~10 minutes duration for 2nd   Last episode last summer July 2023    Spot/lesion on the back  Seems to have present for an extended period of time   has tried to pick it off    Worked with alternative dentists for tooth extraction and cavity fills  Since this procedure has noticed sensation of fragile teeth while eating  Long wait to be seen by preferred family dentist        5/13/2024   General Health   How would you rate your overall physical health? (!) FAIR   Feel stress (tense, anxious, or unable to sleep) To some extent   (!) STRESS CONCERN      5/13/2024   Nutrition   Three or more servings of calcium each day? Yes   Diet: Regular (no restrictions)   How many servings of fruit and vegetables per day? (!) 2-3   How many sweetened beverages each day? (!) 2         5/13/2024   Exercise   Days per week of moderate/strenous exercise 3 days         5/13/2024   Social Factors   Frequency of gathering with friends or relatives Never   Worry food won't last until get money to buy more No   Food not last or not have enough money for food? No   Do you have housing?  Yes   Are you worried about losing your housing? No   Lack of transportation? No   Unable to get utilities (heat,electricity)? No   (!) SOCIAL CONNECTIONS CONCERN      5/13/2024   Dental   Dentist two times every year? Yes         5/13/2024   TB  "Screening   Were you born outside of the [place]? No       Today's PHQ-9 Score:       5/13/2024     8:37 AM   PHQ-9 SCORE   PHQ-9 Total Score MyChart 4 (Minimal depression)   PHQ-9 Total Score 4         5/13/2024   Substance Use   Alcohol more than 3/day or more than 7/wk No   Do you use any other substances recreationally? No     Social History     Tobacco Use    Smoking status: Never    Smokeless tobacco: Never    Tobacco comments:     smoked at age 17    Vaping Use    Vaping status: Never Used   Substance Use Topics    Alcohol use: Yes     Comment: rarely    Drug use: Never         10/8/2021   LAST FHS-7 RESULTS   1st degree relative breast or ovarian cancer Yes   Any relative bilateral breast cancer No   Any male have breast cancer No   Any ONE woman have BOTH breast AND ovarian cancer No   Any woman with breast cancer before 50yrs No   2 or more relatives with breast AND/OR ovarian cancer No   2 or more relatives with breast AND/OR bowel cancer No      Mammogram Screening - Patient under 40 years of age: Routine Mammogram Screening not recommended.         5/13/2024   STI Screening   New sexual partner(s) since last STI/HIV test? No     History of abnormal Pap smear: NO - age 30- 65 PAP every 3 years recommended        Latest Ref Rng & Units 5/8/2017    10:39 AM 5/8/2017    10:30 AM 3/21/2014    12:00 AM   PAP / HPV   PAP (Historical)  NIL   NIL    HPV 16 DNA NEG  Negative     HPV 18 DNA NEG  Negative     Other HR HPV NEG  Negative             5/13/2024   Contraception/Family Planning   Questions about contraception or family planning No      Reviewed and updated as needed this visit by Provider      Review of Systems  Constitutional, HEENT, cardiovascular, pulmonary, gi and gu systems are negative, except as otherwise noted.     Objective    Exam  /64   Pulse 63   Temp 98.8  F (37.1  C) (Temporal)   Resp 16   Ht 1.645 m (5' 4.75\")   Wt 81.6 kg (180 lb)   LMP 05/11/2024   SpO2 99%   BMI 30.19 kg/m   " "  Estimated body mass index is 30.19 kg/m  as calculated from the following:    Height as of this encounter: 1.645 m (5' 4.75\").    Weight as of this encounter: 81.6 kg (180 lb).    Physical Exam  GENERAL: alert and no distress  EYES: Eyes grossly normal to inspection, PERRL and conjunctivae and sclerae normal  HENT: ear canals and TM's normal, nose and mouth without ulcers or lesions  NECK: no adenopathy, no asymmetry, masses, or scars  RESP: lungs clear to auscultation - no rales, rhonchi or wheezes  CV: regular rate and rhythm, normal S1 S2, no S3 or S4, no murmur, click or rub, no peripheral edema  ABDOMEN: soft, nontender, no hepatosplenomegaly, no masses and bowel sounds normal  MS: no gross musculoskeletal defects noted, no edema  NEURO: Normal strength and tone, mentation intact and speech normal  PSYCH: mentation appears normal, affect normal/bright  Foot exam: normal DP and PT pulses, no trophic changes or ulcerative lesions, and nail exam ingrown nail at lateral edge of big toes bilaterally, nails of the big toes only growing half up nail bed    Signed Electronically by: Dominik Carrizales PA-C    Prior to immunization administration, verified patients identity using patient s name and date of birth. Please see Immunization Activity for additional information.     Screening Questionnaire for Adult Immunization    Are you sick today?   No   Do you have allergies to medications, food, a vaccine component or latex?   Yes - Vicodin allergy   Have you ever had a serious reaction after receiving a vaccination?   No   Do you have a long-term health problem with heart, lung, kidney, or metabolic disease (e.g., diabetes), asthma, a blood disorder, no spleen, complement component deficiency, a cochlear implant, or a spinal fluid leak?  Are you on long-term aspirin therapy?   No   Do you have cancer, leukemia, HIV/AIDS, or any other immune system problem?   No   Do you have a parent, brother, or sister with an " immune system problem?   No   In the past 3 months, have you taken medications that affect  your immune system, such as prednisone, other steroids, or anticancer drugs; drugs for the treatment of rheumatoid arthritis, Crohn s disease, or psoriasis; or have you had radiation treatments?   No   Have you had a seizure, or a brain or other nervous system problem?   No   During the past year, have you received a transfusion of blood or blood    products, or been given immune (gamma) globulin or antiviral drug?   No   For women: Are you pregnant or is there a chance you could become       pregnant during the next month?   No   Have you received any vaccinations in the past 4 weeks?   No     Immunization questionnaire was positive for at least one answer.  Notified Brennan Carrizales.      Patient instructed to remain in clinic for 15 minutes afterwards, and to report any adverse reactions.     Screening performed by Lucie Crook CMA on 5/13/2024 at 8:53 AM.       Answers submitted by the patient for this visit:  Patient Health Questionnaire (Submitted on 5/13/2024)  If you checked off any problems, how difficult have these problems made it for you to do your work, take care of things at home, or get along with other people?: Very difficult  PHQ9 TOTAL SCORE: 4  LILIA-7 (Submitted on 5/13/2024)  LILIA 7 TOTAL SCORE: 0

## 2024-05-20 ENCOUNTER — OFFICE VISIT (OUTPATIENT)
Dept: PODIATRY | Facility: CLINIC | Age: 39
End: 2024-05-20
Payer: COMMERCIAL

## 2024-05-20 VITALS
BODY MASS INDEX: 29.99 KG/M2 | HEIGHT: 65 IN | TEMPERATURE: 97.7 F | WEIGHT: 180 LBS | SYSTOLIC BLOOD PRESSURE: 100 MMHG | DIASTOLIC BLOOD PRESSURE: 62 MMHG

## 2024-05-20 DIAGNOSIS — L60.0 INGROWN NAIL: ICD-10-CM

## 2024-05-20 DIAGNOSIS — L60.9 NAIL ABNORMALITIES: ICD-10-CM

## 2024-05-20 PROCEDURE — 99203 OFFICE O/P NEW LOW 30 MIN: CPT | Performed by: PODIATRIST

## 2024-05-20 ASSESSMENT — PAIN SCALES - GENERAL: PAINLEVEL: NO PAIN (0)

## 2024-05-20 NOTE — PATIENT INSTRUCTIONS
INGROWN TOENAIL POSTOPERATIVE INSTRUCTIONS   (Nail avulsion or chemical matrixectomy)   1.  Go directly home and elevate the affected foot on one or two pillows for the remainder of the day & evening. Your toe may stay numb for 2-8 hours.   2.  Take Tylenol, ibuprofen or another anti-inflammatory as needed for pain. Most people require no pain medication.  3.  Use oral antibiotic if that was prescribed at your doctor visit. Take the entire prescription even if your symptoms have improved.   4.  Keep dressing dry and intact the day of the procedure. The morning after the procedure, remove entire dressing and soak or wash the affected area in lukewarm water for 5-10 minutes.  You may add Epsom salt to soothe the area and help it become drier. Do this twice a day or more until the surgical site remains dry without drainage.  This may take 1-2 weeks if a small part of your nail was removed or 4-8 weeks if the entire nail was removed. You may count showering or bathing as one soak.  After each soak, pat the area dry with a clean towel or gauze and then allow to air dry for a few minutes. Then cover with a cloth or fabric bandaid.  Avoid ointments as they keep the area to wet. Encourage this wound to become dry with a scab.   5.  You may walk and pursue everyday activities as tolerated with either an open toe shoe or cut-out shoe as needed. You may wear regular roomy shoes if no pain is noted.  No swimming in the lake, river, pool or hot tub until the wound has become dry.   7.  Watch for any signs or symptoms of infection such as: red streaks going up the foot/leg, swelling, pus or foul odor. For patients that have had a permanent phenol procedure, the toe will drain longer and may look red or sore for a half an inch around the wound similar to infection because it is a chemical burn.  Please call with questions.  8.  You may call my office in 1-2 weeks if the surgical site is not becoming drier or if other complications  "occur.   9.  There is 5-10% chance of complications such as infection or formation of another nail or a thick scared nail.        Nail Debridement    A high quality instrument makes trimming toenails MUCH easier.  Search ebay for any 5\" nail nipper manufactured by reliable brands such as Miltex, Integra or Jarit as these quality instruments will help manage difficult nails more effectively and comfortably. We use Miltex -SS.  A physician is not necessary to trim nails even if you are taking blood thinners or are diabetic.  Your family or care givers may help manage your toenails.      Trim or sand the nails once weekly.  Do not wait until they are long and painful or trimming will become too difficult and painful and will increase your risk of complications or infection.  A course file or 120 grit sandpaper on a sanding block can be helpful.  For very thick nails many people prefer battery operated gilbert such as an Amope', Personal Pedi and Emjoi for regular use or heavy painful callouses or thick toenails.    Trim or skive any portion of nail that is thick, loose, crumbling, or not well attached. Do not tear the nail away, but rather cut them with a nail nipperor sand or sand them down.  You may follow up with your Podiatric Physician if you have pain, bleeding, infection, questions or other concerns.      You may also contact the following Registered Nurses for further help with nail debridement and minor hygiene concnerns.  They may come to your home or meet them at their clinic to trim your toenails and soak your feet, as well as monitor for any complications that would require evaluation by a Physician.      [facility]  Heather Carrizales RN  Phone & text [phone]    [facility]  Sonja Queen RN  Office [phone]    [facility]  [phone]  [email or web address] [facility]    For up to date list and to find foot care nurses in other " communities visit [employer] Association website:  afcna.org.     Calluses, Corns, IPKs, Porokeratosis    When there is excessive friction or pressure on the skin, the body responds by making the skin thicker.  While this may protect the deeper structures, the thickened skin can take up more space and thus increase pressure over a bony prominence or become an open sore or skin ulcer as this skin becomes less flexible.    Flat, diffuse thickening are simple calluses and they are usually caused by friction.  Often these are the result of rubbing on a shoe or going barefoot.    Calluses with a central core between the toes are called corns.  These often result from prominent joints on adjacent toes rubbing together.  Theses are often a symptom of bone malalignment and will usually recur unless the underlying bones are addressed.    Many of these lesions can be kept comfortable with routine maintenance. This consists of filing them with a Ped Egg, callus file, or 120 grit sandpaper on a block, every day during your bath or shower.  Most people prefer battery operated gilbert such as an Amope', Personal Pedi and Emjoi for regular use or heavy painful callouses.  Heavy creams or ointments can be applied 1-2 times every day to keep them soft. Toe spacers can be used for corns, gel pads can be used for other lesions on the bottom of the foot. If there is a deformity noted, such as a prominent bone, often this can be addressed to minimize recurrence. However, sometimes the pressure and lesion simply migrates to another spot after surgery, so it is not a guaranteed cure.     If you have severe callouses and cracking, you may apply heavy ointments that you scoop up such as Cetaphil cream, Eucerin, Aquaphor or Vaseline.  Be sure to obtain cream or ointment in these brands and not lotion (lotion is water based and not durable enough for feet). For more aggressive help apply heavy creams or ointment under occlusive  dressings such as Saran Wrap or Jelly Feet while sleeping.   Jelly Feet can be obtained at [email or web address].     To be successful with managing hyperkeratotic skin, you must manage hygiene daily.  Apply the cream once or twice EVERY day.  At your bath or shower time is the easiest time to work on this when skin is most soft.  There is no medical or surgical treatment that will absolutely eliminate many of these symptoms.      Pedifix is a reliable source for all sorts of foot pads, cushions, or interdigital spacers and foot appliances. Go to www.[employer] or request a catalog at 2-641-Hickies.        Please call with any additional questions.

## 2024-05-20 NOTE — PROGRESS NOTES
HPI:  Geneva Cooley is a 38 year old female who is seen in consultation at the request of Dominik Carrizales PA-C    Pt presents for eval of:   (Onset, Location, L/R, Character, Treatments, Injury if yes)     Ongoing for years, blisters under Left and Right great toenails causing toenail to lift w/ingrown medial Left > Right great toenail.  Intermittent redness, swelling. Sharp and throbbing pain with pressure.  Keeps toenails trimmed short, lifting medial border of toenail to remove toneial.    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       ROS:  10 point ROS neg other than the symptoms noted above in the HPI.    Patient Active Problem List   Diagnosis    Insomnia    Knee strain    Anxiety    Major depressive disorder, single episode, moderate (H)    Chronic bilateral low back pain without sciatica    Family history of breast cancer in mother    Nieves splints    Family history of rheumatoid arthritis    Bariatric surgery status    Obesity, Class I, BMI 30-34.9    Postsurgical malabsorption    Patellofemoral instability of both knees with pain    Patella luis fernando    Hidradenitis       PAST MEDICAL HISTORY:   Past Medical History:   Diagnosis Date    Depression     Shingles outbreak 01/01/2012        PAST SURGICAL HISTORY:   Past Surgical History:   Procedure Laterality Date    ABDOMEN SURGERY  02/2018    COMBINED LAPAROSCOPIC GASTRIC SLEEVE, CHOLECYSTECTOMY N/A 02/05/2018    Procedure: COMBINED LAPAROSCOPIC GASTRIC SLEEVE, CHOLECYSTECTOMY;   LAPAROSCOPIC GASTRIC SLEEVE ;  Surgeon: Yoel Lowry MD;  Location:  OR        MEDICATIONS:   Current Outpatient Medications:     levonorgestrel (MIRENA) 20 MCG/24HR IUD, 1 each (20 mcg) by Intrauterine route once, Disp:  , Rfl:     Semaglutide-Weight Management (WEGOVY) 0.25 MG/0.5ML pen, Inject 0.25 mg Subcutaneous once a week, Disp: 2 mL, Rfl: 0    VITAMIN D, CHOLECALCIFEROL, PO, Take 5,000 Units by mouth daily, Disp: , Rfl:     esomeprazole (NEXIUM) 20 MG DR capsule, Take 20 mg  by mouth every morning (before breakfast) Take 30-60 minutes before eating. (Patient not taking: Reported on 5/20/2024), Disp: , Rfl:     hydrOXYzine HCl (ATARAX) 10 MG tablet, Take 1 tablet (10 mg) by mouth 3 times daily as needed for anxiety (Patient not taking: Reported on 5/20/2024), Disp: 90 tablet, Rfl: 0     ALLERGIES:    Allergies   Allergen Reactions    Vicodin [Hydrocodone-Acetaminophen] Hives        SOCIAL HISTORY:   Social History     Socioeconomic History    Marital status: Single     Spouse name: Not on file    Number of children: Not on file    Years of education: Not on file    Highest education level: Not on file   Occupational History     Employer: [employer]     Comment: school bus diver   Tobacco Use    Smoking status: Never    Smokeless tobacco: Never    Tobacco comments:     smoked at age 17    Vaping Use    Vaping status: Never Used   Substance and Sexual Activity    Alcohol use: Yes     Comment: rarely    Drug use: Never    Sexual activity: Yes     Partners: Male     Birth control/protection: I.U.D.   Other Topics Concern    Parent/sibling w/ CABG, MI or angioplasty before 65F 55M? Yes   Social History Narrative    Not on file     Social Determinants of Health     Financial Resource Strain: Low Risk  (5/13/2024)    Financial Resource Strain     Within the past 12 months, have you or your family members you live with been unable to get utilities (heat, electricity) when it was really needed?: No   Food Insecurity: Low Risk  (5/13/2024)    Food Insecurity     Within the past 12 months, did you worry that your food would run out before you got money to buy more?: No     Within the past 12 months, did the food you bought just not last and you didn t have money to get more?: No   Transportation Needs: Low Risk  (5/13/2024)    Transportation Needs     Within the past 12 months, has lack of transportation kept you from medical appointments, getting your medicines, non-medical meetings or  "appointments, work, or from getting things that you need?: No   Physical Activity: Unknown (5/13/2024)    Exercise Vital Sign     Days of Exercise per Week: 3 days     Minutes of Exercise per Session: Not on file   Stress: Stress Concern Present (5/13/2024)    [employer] - Occupational Stress Questionnaire     Feeling of Stress : To some extent   Social Connections: Unknown (5/13/2024)    Social Connection and Isolation Panel [NHANES]     Frequency of Communication with Friends and Family: Not on file     Frequency of Social Gatherings with Friends and Family: Never     Attends Congregation Services: Not on file     Active Member of Clubs or Organizations: Not on file     Attends Club or Organization Meetings: Not on file     Marital Status: Not on file   Interpersonal Safety: Low Risk  (5/13/2024)    Interpersonal Safety     Do you feel physically and emotionally safe where you currently live?: Yes     Within the past 12 months, have you been hit, slapped, kicked or otherwise physically hurt by someone?: No     Within the past 12 months, have you been humiliated or emotionally abused in other ways by your partner or ex-partner?: No   Housing Stability: Low Risk  (5/13/2024)    Housing Stability     Do you have housing? : Yes     Are you worried about losing your housing?: No        FAMILY HISTORY:   Family History   Problem Relation Age of Onset    Hypertension Mother     Diabetes Mother     Cerebrovascular Disease Mother 46        aneurysm    Lipids Mother     Depression Mother     Breast Cancer Mother 60    Hypertension Father     Arthritis Father     Heart Disease Father         MI in late 40's     Lipids Father     Depression Sister     Hypertension Sister     Hypertension Brother     Cerebrovascular Disease Brother         EXAM:Vitals: /62 (BP Location: Left arm, Patient Position: Sitting, Cuff Size: Adult Regular)   Temp 97.7  F (36.5  C) (Temporal)   Ht 1.645 m (5' 4.75\")  "  Wt 81.6 kg (180 lb)   LMP 05/11/2024   BMI 30.19 kg/m    BMI= Body mass index is 30.19 kg/m .    General appearance: Patient is alert and fully cooperative with history & exam.  No sign of distress is noted during the visit.     Psychiatric: Affect is pleasant & appropriate.  Patient appears motivated to improve health.     Respiratory: Breathing is regular & unlabored while sitting.     HEENT: Hearing is intact to spoken word.  Speech is clear.  No gross evidence of visual impairment that would impact ambulation.     Vascular: DP & PT pulses are intact & regular bilaterally.  No significant edema or varicosities noted.  CFT and skin temperature is normal to both lower extremities.     Neurologic: Lower extremity sensation is intact to light touch.  No evidence of weakness or contracture in the lower extremities.  No evidence of neuropathy.    Dermatologic: Skin is intact to both lower extremities with adequate texture, turgor and tone about the integument.  No paronychia or evidence of soft tissue infection is noted.  Both hallux nails are incurvated at the borders with localized erythema but no bleeding or drainage.  No infection    Musculoskeletal: Patient is ambulatory without assistive device or brace.  No gross ankle deformity noted.  No foot or ankle joint effusion is noted.      ASSESSMENT:       ICD-10-CM    1. Ingrown nail  L60.0 Orthopedic  Referral      2. Nail abnormalities  L60.9 Orthopedic  Referral           PLAN:  Reviewed patient's chart in [facility].      5/20/2024   Offered matrixectomy but she is a bit skeptical of this for her level of symptoms.   This appropriate nail hygiene and may follow-up for matrixectomy at any point in time.    Edwin Taylor DPM

## 2024-05-20 NOTE — LETTER
5/20/2024         RE: Geneva Cooley  [address]  [place] [address]        Dear Colleague,    Thank you for referring your patient, Geneva Cooley, to the [facility]. Please see a copy of my visit note below.    HPI:  Geneva Cooley is a 38 year old female who is seen in consultation at the request of Dominik Carrizales PA-C    Pt presents for eval of:   (Onset, Location, L/R, Character, Treatments, Injury if yes)     Ongoing for years, blisters under Left and Right great toenails causing toenail to lift w/ingrown medial Left > Right great toenail.  Intermittent redness, swelling. Sharp and throbbing pain with pressure.  Keeps toenails trimmed short, lifting medial border of toenail to remove toneial.    Owners of outdoor amusement business.       ROS:  10 point ROS neg other than the symptoms noted above in the HPI.    Patient Active Problem List   Diagnosis     Insomnia     Knee strain     Anxiety     Major depressive disorder, single episode, moderate (H)     Chronic bilateral low back pain without sciatica     Family history of breast cancer in mother     Nieves splints     Family history of rheumatoid arthritis     Bariatric surgery status     Obesity, Class I, BMI 30-34.9     Postsurgical malabsorption     Patellofemoral instability of both knees with pain     Patella luis fernando     Hidradenitis       PAST MEDICAL HISTORY:   Past Medical History:   Diagnosis Date     Depression      Shingles outbreak 01/01/2012        PAST SURGICAL HISTORY:   Past Surgical History:   Procedure Laterality Date     ABDOMEN SURGERY  02/2018     COMBINED LAPAROSCOPIC GASTRIC SLEEVE, CHOLECYSTECTOMY N/A 02/05/2018    Procedure: COMBINED LAPAROSCOPIC GASTRIC SLEEVE, CHOLECYSTECTOMY;   LAPAROSCOPIC GASTRIC SLEEVE ;  Surgeon: Yoel Lowry MD;  Location:  OR        MEDICATIONS:   Current Outpatient Medications:      levonorgestrel (MIRENA) 20 MCG/24HR IUD, 1 each (20 mcg) by Intrauterine route once, Disp:   , Rfl:      Semaglutide-Weight Management (WEGOVY) 0.25 MG/0.5ML pen, Inject 0.25 mg Subcutaneous once a week, Disp: 2 mL, Rfl: 0     VITAMIN D, CHOLECALCIFEROL, PO, Take 5,000 Units by mouth daily, Disp: , Rfl:      esomeprazole (NEXIUM) 20 MG DR capsule, Take 20 mg by mouth every morning (before breakfast) Take 30-60 minutes before eating. (Patient not taking: Reported on 5/20/2024), Disp: , Rfl:      hydrOXYzine HCl (ATARAX) 10 MG tablet, Take 1 tablet (10 mg) by mouth 3 times daily as needed for anxiety (Patient not taking: Reported on 5/20/2024), Disp: 90 tablet, Rfl: 0     ALLERGIES:    Allergies   Allergen Reactions     Vicodin [Hydrocodone-Acetaminophen] Hives        SOCIAL HISTORY:   Social History     Socioeconomic History     Marital status: Single     Spouse name: Not on file     Number of children: Not on file     Years of education: Not on file     Highest education level: Not on file   Occupational History     Employer: [employer]     Comment: school bus diver   Tobacco Use     Smoking status: Never     Smokeless tobacco: Never     Tobacco comments:     smoked at age 17    Vaping Use     Vaping status: Never Used   Substance and Sexual Activity     Alcohol use: Yes     Comment: rarely     Drug use: Never     Sexual activity: Yes     Partners: Male     Birth control/protection: I.U.D.   Other Topics Concern     Parent/sibling w/ CABG, MI or angioplasty before 65F 55M? Yes   Social History Narrative     Not on file     Social Determinants of Health     Financial Resource Strain: Low Risk  (5/13/2024)    Financial Resource Strain      Within the past 12 months, have you or your family members you live with been unable to get utilities (heat, electricity) when it was really needed?: No   Food Insecurity: Low Risk  (5/13/2024)    Food Insecurity      Within the past 12 months, did you worry that your food would run out before you got money to buy more?: No      Within the past 12 months, did the food  you bought just not last and you didn t have money to get more?: No   Transportation Needs: Low Risk  (5/13/2024)    Transportation Needs      Within the past 12 months, has lack of transportation kept you from medical appointments, getting your medicines, non-medical meetings or appointments, work, or from getting things that you need?: No   Physical Activity: Unknown (5/13/2024)    Exercise Vital Sign      Days of Exercise per Week: 3 days      Minutes of Exercise per Session: Not on file   Stress: Stress Concern Present (5/13/2024)    [employer] - Occupational Stress Questionnaire      Feeling of Stress : To some extent   Social Connections: Unknown (5/13/2024)    Social Connection and Isolation Panel [NHANES]      Frequency of Communication with Friends and Family: Not on file      Frequency of Social Gatherings with Friends and Family: Never      Attends Adventism Services: Not on file      Active Member of Clubs or Organizations: Not on file      Attends Club or Organization Meetings: Not on file      Marital Status: Not on file   Interpersonal Safety: Low Risk  (5/13/2024)    Interpersonal Safety      Do you feel physically and emotionally safe where you currently live?: Yes      Within the past 12 months, have you been hit, slapped, kicked or otherwise physically hurt by someone?: No      Within the past 12 months, have you been humiliated or emotionally abused in other ways by your partner or ex-partner?: No   Housing Stability: Low Risk  (5/13/2024)    Housing Stability      Do you have housing? : Yes      Are you worried about losing your housing?: No        FAMILY HISTORY:   Family History   Problem Relation Age of Onset     Hypertension Mother      Diabetes Mother      Cerebrovascular Disease Mother 46        aneurysm     Lipids Mother      Depression Mother      Breast Cancer Mother 60     Hypertension Father      Arthritis Father      Heart Disease Father         MI in  "late 40's      Lipids Father      Depression Sister      Hypertension Sister      Hypertension Brother      Cerebrovascular Disease Brother         EXAM:Vitals: /62 (BP Location: Left arm, Patient Position: Sitting, Cuff Size: Adult Regular)   Temp 97.7  F (36.5  C) (Temporal)   Ht 1.645 m (5' 4.75\")   Wt 81.6 kg (180 lb)   LMP 05/11/2024   BMI 30.19 kg/m    BMI= Body mass index is 30.19 kg/m .    General appearance: Patient is alert and fully cooperative with history & exam.  No sign of distress is noted during the visit.     Psychiatric: Affect is pleasant & appropriate.  Patient appears motivated to improve health.     Respiratory: Breathing is regular & unlabored while sitting.     HEENT: Hearing is intact to spoken word.  Speech is clear.  No gross evidence of visual impairment that would impact ambulation.     Vascular: DP & PT pulses are intact & regular bilaterally.  No significant edema or varicosities noted.  CFT and skin temperature is normal to both lower extremities.     Neurologic: Lower extremity sensation is intact to light touch.  No evidence of weakness or contracture in the lower extremities.  No evidence of neuropathy.    Dermatologic: Skin is intact to both lower extremities with adequate texture, turgor and tone about the integument.  No paronychia or evidence of soft tissue infection is noted.  Both hallux nails are incurvated at the borders with localized erythema but no bleeding or drainage.  No infection    Musculoskeletal: Patient is ambulatory without assistive device or brace.  No gross ankle deformity noted.  No foot or ankle joint effusion is noted.      ASSESSMENT:       ICD-10-CM    1. Ingrown nail  L60.0 Orthopedic  Referral      2. Nail abnormalities  L60.9 Orthopedic  Referral           PLAN:  Reviewed patient's chart in [facility].      5/20/2024   Offered matrixectomy but she is a bit skeptical of this for her level of symptoms.   This appropriate nail " hygiene and may follow-up for matrixectomy at any point in time.    Edwin Taylor DPM        Again, thank you for allowing me to participate in the care of your patient.        Sincerely,        Edwin Taylor DPM

## 2024-06-03 NOTE — PATIENT INSTRUCTIONS
For bleeding - Tranexamic acid 1300 mg three times daily for up to 5 days until bleeding stops.    SILVIA LoboC     Walking

## 2024-06-07 DIAGNOSIS — E66.811 CLASS 1 OBESITY DUE TO EXCESS CALORIES WITHOUT SERIOUS COMORBIDITY WITH BODY MASS INDEX (BMI) OF 30.0 TO 30.9 IN ADULT: ICD-10-CM

## 2024-06-07 DIAGNOSIS — E66.09 CLASS 1 OBESITY DUE TO EXCESS CALORIES WITHOUT SERIOUS COMORBIDITY WITH BODY MASS INDEX (BMI) OF 30.0 TO 30.9 IN ADULT: ICD-10-CM

## 2024-08-22 ENCOUNTER — MYC REFILL (OUTPATIENT)
Dept: FAMILY MEDICINE | Facility: OTHER | Age: 39
End: 2024-08-22

## 2024-08-22 DIAGNOSIS — E66.09 CLASS 1 OBESITY DUE TO EXCESS CALORIES WITHOUT SERIOUS COMORBIDITY WITH BODY MASS INDEX (BMI) OF 30.0 TO 30.9 IN ADULT: ICD-10-CM

## 2024-08-22 DIAGNOSIS — E66.811 CLASS 1 OBESITY DUE TO EXCESS CALORIES WITHOUT SERIOUS COMORBIDITY WITH BODY MASS INDEX (BMI) OF 30.0 TO 30.9 IN ADULT: ICD-10-CM

## 2024-11-11 ENCOUNTER — MYC REFILL (OUTPATIENT)
Dept: FAMILY MEDICINE | Facility: OTHER | Age: 39
End: 2024-11-11
Payer: COMMERCIAL

## 2024-11-11 DIAGNOSIS — E66.811 CLASS 1 OBESITY DUE TO EXCESS CALORIES WITHOUT SERIOUS COMORBIDITY WITH BODY MASS INDEX (BMI) OF 30.0 TO 30.9 IN ADULT: ICD-10-CM

## 2024-11-11 DIAGNOSIS — E66.09 CLASS 1 OBESITY DUE TO EXCESS CALORIES WITHOUT SERIOUS COMORBIDITY WITH BODY MASS INDEX (BMI) OF 30.0 TO 30.9 IN ADULT: ICD-10-CM

## 2024-11-11 NOTE — TELEPHONE ENCOUNTER
Patient is overdue for office visit. Renetta period has been provided and patient did not schedule. Please help patient schedule appointment and I can fill medication to get them to that date.     Thanks,  Dominik Carrizales PA-C on 11/11/2024 at 3:37 PM

## 2024-11-12 NOTE — TELEPHONE ENCOUNTER
Patient is overdue for medication follow up. It looks like she scheduled an appointment for 6 months down the road, 05/2025. This is too far out to continue the medication. She also sent a message that her insurance has changed and she would like to look into alternative options. Please help her schedule virtual or office visit within the next month to address these concerns.    Thanks,  Dominik Carrizales PA-C on 11/12/2024 at 2:07 PM

## 2024-11-12 NOTE — TELEPHONE ENCOUNTER
Called and lvm for pt to call the clinic and schedule appt with JR, will also send a [facility] message. Postponing to see if pt calls.

## 2025-02-10 NOTE — TELEPHONE ENCOUNTER
PRIOR AUTHORIZATION DENIED    Medication: Omeprazole 20 MG    Denial Date: 6/19/2018    Denial Rational: Insurance allow max 1 capsule a day for 120 days within 365 days:        Appeal Information: IF YOU WOULD LIKE TO APPEAL, PLEASE SUPPLY PA TEAM WITH A LETTER OF MEDICAL NECESSITY.              " I'm having left axillary mass removal"

## (undated) DEVICE — ENDO TROCAR FIRST ENTRY KII FIOS Z-THRD 05X100MM CTF03

## (undated) DEVICE — ESU HOLDER LAP INST DISP PURPLE LONG 330MM H-PRO-330

## (undated) DEVICE — SU VICRYL 2-0 SH 27" J317H

## (undated) DEVICE — SOL NACL 0.9% IRRIG 1000ML BOTTLE 07138-09

## (undated) DEVICE — ENDO TROCAR OPTICAL 05MM VERSAPORT PLUS W/FIX CAN ONB5STF

## (undated) DEVICE — ESU LIGASURE MARYLAND LAPAROSCOPIC SLR/DVDR 5MMX37CM LF1937

## (undated) DEVICE — ESU GROUND PAD UNIVERSAL W/O CORD

## (undated) DEVICE — SU VICRYL 4-0 PS-2 18" UND J496H

## (undated) DEVICE — BNDG ABDOMINAL BINDER 9X62-84" 79-89210

## (undated) DEVICE — GOWN IMPERVIOUS SPECIALTY XLG/XLONG 32474

## (undated) DEVICE — DRSG BANDAID 3/4X3"

## (undated) DEVICE — STPL ENDO RELOAD SIG GIA REINFORCED 60MM X-TRA SIGTRS60AXT

## (undated) DEVICE — DRSG GAUZE 4X4" 3033

## (undated) DEVICE — SUCTION CANISTER MEDIVAC LINER 3000ML W/LID 65651-530

## (undated) DEVICE — GLOVE GAMMEX DERMAPRENE ULTRA SZ 8.5 LF 8517

## (undated) DEVICE — SUCTION IRR STRYKERFLOW II W/TIP 250-070-520

## (undated) DEVICE — PREP CHLORAPREP 26ML TINTED ORANGE  260815

## (undated) DEVICE — ENDO TROCAR OPTICAL ACCESS KII Z-THRD 15X100MM C0R37

## (undated) DEVICE — SOL NACL 0.9% INJ 1000ML BAG 2B1324X

## (undated) DEVICE — STPL ENDO RELOAD SIG GIA REINFORCED 60MM MED SIGTRS60AMT

## (undated) DEVICE — SU VICRYL 0 TIE 12X18" J906G

## (undated) DEVICE — ENDO TROCAR OPTICAL 12MM VERSAPORT PLUS W/FIX CAN ONB12STF

## (undated) DEVICE — LINEN TOWEL PACK X5 5464

## (undated) DEVICE — ENDO CANNULA 05MM VERSAONE UNIVERSAL UNVCA5STF

## (undated) DEVICE — PACK LAP CHOLE SLC15LCFSD

## (undated) RX ORDER — EPINEPHRINE 1 MG/ML
INJECTION, SOLUTION INTRAMUSCULAR; SUBCUTANEOUS
Status: DISPENSED
Start: 2018-02-05

## (undated) RX ORDER — HEPARIN SODIUM 5000 [USP'U]/.5ML
INJECTION, SOLUTION INTRAVENOUS; SUBCUTANEOUS
Status: DISPENSED
Start: 2018-02-05

## (undated) RX ORDER — BUPIVACAINE HYDROCHLORIDE 2.5 MG/ML
INJECTION, SOLUTION EPIDURAL; INFILTRATION; INTRACAUDAL
Status: DISPENSED
Start: 2018-02-05

## (undated) RX ORDER — VECURONIUM BROMIDE 1 MG/ML
INJECTION, POWDER, LYOPHILIZED, FOR SOLUTION INTRAVENOUS
Status: DISPENSED
Start: 2018-02-05

## (undated) RX ORDER — PROPOFOL 10 MG/ML
INJECTION, EMULSION INTRAVENOUS
Status: DISPENSED
Start: 2018-02-05

## (undated) RX ORDER — HYDROMORPHONE HYDROCHLORIDE 1 MG/ML
INJECTION, SOLUTION INTRAMUSCULAR; INTRAVENOUS; SUBCUTANEOUS
Status: DISPENSED
Start: 2018-02-05

## (undated) RX ORDER — GLYCOPYRROLATE 0.2 MG/ML
INJECTION, SOLUTION INTRAMUSCULAR; INTRAVENOUS
Status: DISPENSED
Start: 2018-02-05

## (undated) RX ORDER — ONDANSETRON 2 MG/ML
INJECTION INTRAMUSCULAR; INTRAVENOUS
Status: DISPENSED
Start: 2018-02-05

## (undated) RX ORDER — LIDOCAINE HYDROCHLORIDE 20 MG/ML
INJECTION, SOLUTION EPIDURAL; INFILTRATION; INTRACAUDAL; PERINEURAL
Status: DISPENSED
Start: 2018-02-05

## (undated) RX ORDER — DEXAMETHASONE SODIUM PHOSPHATE 4 MG/ML
INJECTION, SOLUTION INTRA-ARTICULAR; INTRALESIONAL; INTRAMUSCULAR; INTRAVENOUS; SOFT TISSUE
Status: DISPENSED
Start: 2018-02-05

## (undated) RX ORDER — FENTANYL CITRATE 50 UG/ML
INJECTION, SOLUTION INTRAMUSCULAR; INTRAVENOUS
Status: DISPENSED
Start: 2018-02-05